# Patient Record
Sex: FEMALE | Race: WHITE | NOT HISPANIC OR LATINO | Employment: OTHER | URBAN - METROPOLITAN AREA
[De-identification: names, ages, dates, MRNs, and addresses within clinical notes are randomized per-mention and may not be internally consistent; named-entity substitution may affect disease eponyms.]

---

## 2018-01-09 ENCOUNTER — HOSPITAL ENCOUNTER (OUTPATIENT)
Dept: URGENT CARE | Facility: CLINIC | Age: 63
Discharge: HOME | End: 2018-01-09
Attending: FAMILY MEDICINE | Admitting: FAMILY MEDICINE

## 2018-01-18 ENCOUNTER — HOSPITAL ENCOUNTER (OUTPATIENT)
Dept: URGENT CARE | Facility: CLINIC | Age: 63
Setting detail: SPECIMEN
Discharge: HOME | End: 2018-01-18
Attending: FAMILY MEDICINE | Admitting: FAMILY MEDICINE

## 2018-01-18 ENCOUNTER — HOSPITAL ENCOUNTER (OUTPATIENT)
Dept: LAB | Facility: HOSPITAL | Age: 63
Discharge: HOME | End: 2018-01-18
Attending: FAMILY MEDICINE | Admitting: FAMILY MEDICINE

## 2018-01-18 ENCOUNTER — HOSPITAL ENCOUNTER (OUTPATIENT)
Dept: URGENT CARE | Facility: CLINIC | Age: 63
Discharge: HOME | End: 2018-01-18
Attending: FAMILY MEDICINE | Admitting: FAMILY MEDICINE

## 2018-01-18 LAB
BACTERIA SPEC AEROBE CULT: NORMAL
BACTERIA SPEC AEROBE CULT: NORMAL
CONV ABS BANDS: 1.7 10*3/UL
CONV POLYCHROMASIA IN BLOOD BY LIGHT MICROSCOPY: SLIGHT
CONV TOXIC GRANULES IN BLOOD BY LIGHT MICROSCOPY: SLIGHT
DIFFERENTIAL METHOD BLD: (no result)
EOSINOPHIL # BLD AUTO: 0.6 10*3/UL (ref 0–0.3)
EOSINOPHIL # BLD AUTO: 3 % (ref 0–3)
ERYTHROCYTE [DISTWIDTH] IN BLOOD BY AUTOMATED COUNT: 13.3 % (ref 11.5–14.5)
HCT VFR BLD AUTO: 30.8 % (ref 35–49)
HGB BLD-MCNC: 10.3 G/DL (ref 12–15)
LYMPHOCYTES # BLD AUTO: 1.3 10*3/UL (ref 0.8–4.8)
LYMPHOCYTES NFR BLD AUTO: 6 % (ref 18–42)
Lab: NORMAL
Lab: NORMAL
MCH RBC QN AUTO: 28.7 PG (ref 26–32)
MCHC RBC AUTO-ENTMCNC: 33.4 G/DL (ref 32–36)
MCV RBC AUTO: 86 FL (ref 80–94)
MICRO REPORT STATUS: NORMAL
MICRO REPORT STATUS: NORMAL
MONOCYTES # BLD AUTO: 0.8 10*3/UL (ref 0.1–1.3)
MONOCYTES NFR BLD AUTO: 4 % (ref 2–11)
NEUTROPHILS # BLD AUTO: 16.6 10*3/UL (ref 2.3–8.6)
NEUTROPHILS NFR BLD AUTO: 79 % (ref 50–75)
NEUTS BAND NFR BLD MANUAL: 8 % (ref 0–5)
PATHOLOGIST REVIEW: (no result)
PATHOLOGIST REVIEW: (no result)
PLATELET # BLD AUTO: 845 10*3/UL (ref 150–450)
PLT COMMENT: (no result)
PMV BLD AUTO: 6.2 FL (ref 7.4–10.4)
RBC # BLD AUTO: 3.58 10*6/UL (ref 4–5.4)
SPECIMEN SOURCE: NORMAL
SPECIMEN SOURCE: NORMAL
WBC # BLD AUTO: 21 10*3/UL (ref 4.5–11.5)

## 2018-01-22 ENCOUNTER — CLINICAL SUPPORT (OUTPATIENT)
Dept: ONCOLOGY | Facility: HOSPITAL | Age: 63
End: 2018-01-22

## 2018-01-22 ENCOUNTER — HOSPITAL ENCOUNTER (OUTPATIENT)
Dept: ONCOLOGY | Facility: HOSPITAL | Age: 63
Discharge: HOME | End: 2018-01-22
Attending: INTERNAL MEDICINE | Admitting: INTERNAL MEDICINE

## 2018-01-22 ENCOUNTER — HOSPITAL ENCOUNTER (OUTPATIENT)
Dept: ONCOLOGY | Facility: CLINIC | Age: 63
Setting detail: INFUSION SERIES
Discharge: HOME | End: 2018-01-22
Attending: INTERNAL MEDICINE | Admitting: INTERNAL MEDICINE

## 2018-01-22 LAB
ALBUMIN SERPL-MCNC: 2.4 G/DL (ref 3.5–4.8)
ALBUMIN/GLOB SERPL: 0.5 {RATIO} (ref 1–1.7)
ALP SERPL-CCNC: 84 IU/L (ref 32–91)
ALT SERPL-CCNC: 46 IU/L (ref 14–54)
ANION GAP SERPL CALC-SCNC: 12.5 MMOL/L (ref 10–20)
AST SERPL-CCNC: 29 IU/L (ref 15–41)
BILIRUB SERPL-MCNC: 1 MG/DL (ref 0.3–1.2)
BUN SERPL-MCNC: 9 MG/DL (ref 8–20)
BUN/CREAT SERPL: 15 (ref 5.4–26.2)
CALCIUM SERPL-MCNC: 8.7 MG/DL (ref 8.9–10.3)
CHLORIDE SERPL-SCNC: 99 MMOL/L (ref 101–111)
CONV CO2: 28 MMOL/L (ref 22–32)
CONV TOTAL PROTEIN: 7.5 G/DL (ref 6.1–7.9)
CREAT UR-MCNC: 0.6 MG/DL (ref 0.4–1)
GLOBULIN UR ELPH-MCNC: 5.1 G/DL (ref 2.5–3.8)
GLUCOSE SERPL-MCNC: 76 MG/DL (ref 65–99)
IRON SATN MFR SERPL: 7 % (ref 15–50)
IRON SERPL-MCNC: 15 UG/DL (ref 28–170)
POTASSIUM SERPL-SCNC: 3.5 MMOL/L (ref 3.6–5.1)
SODIUM SERPL-SCNC: 136 MMOL/L (ref 136–144)
TIBC SERPL-MCNC: 220 UG/DL (ref 228–428)

## 2018-01-24 LAB — HAPTOGLOB SERPL-MCNC: 582 MG/DL (ref 36–195)

## 2018-01-29 ENCOUNTER — HOSPITAL ENCOUNTER (OUTPATIENT)
Dept: ONCOLOGY | Facility: HOSPITAL | Age: 63
Discharge: HOME | End: 2018-01-29
Attending: INTERNAL MEDICINE | Admitting: INTERNAL MEDICINE

## 2018-01-29 ENCOUNTER — HOSPITAL ENCOUNTER (OUTPATIENT)
Dept: ONCOLOGY | Facility: CLINIC | Age: 63
Setting detail: INFUSION SERIES
Discharge: HOME | End: 2018-01-29
Attending: INTERNAL MEDICINE | Admitting: INTERNAL MEDICINE

## 2018-01-29 ENCOUNTER — HOSPITAL ENCOUNTER (OUTPATIENT)
Dept: GENERAL RADIOLOGY | Facility: HOSPITAL | Age: 63
Discharge: HOME | End: 2018-01-29
Attending: INTERNAL MEDICINE | Admitting: INTERNAL MEDICINE

## 2018-02-05 ENCOUNTER — CLINICAL SUPPORT (OUTPATIENT)
Dept: ONCOLOGY | Facility: HOSPITAL | Age: 63
End: 2018-02-05

## 2018-02-05 ENCOUNTER — HOSPITAL ENCOUNTER (OUTPATIENT)
Dept: ONCOLOGY | Facility: CLINIC | Age: 63
Setting detail: INFUSION SERIES
Discharge: HOME | End: 2018-02-05
Attending: INTERNAL MEDICINE | Admitting: INTERNAL MEDICINE

## 2018-02-20 ENCOUNTER — HOSPITAL ENCOUNTER (OUTPATIENT)
Dept: ONCOLOGY | Facility: HOSPITAL | Age: 63
Discharge: HOME | End: 2018-02-20
Attending: INTERNAL MEDICINE | Admitting: INTERNAL MEDICINE

## 2018-02-20 ENCOUNTER — CLINICAL SUPPORT (OUTPATIENT)
Dept: ONCOLOGY | Facility: HOSPITAL | Age: 63
End: 2018-02-20

## 2018-02-20 ENCOUNTER — HOSPITAL ENCOUNTER (OUTPATIENT)
Dept: ONCOLOGY | Facility: CLINIC | Age: 63
Setting detail: INFUSION SERIES
Discharge: HOME | End: 2018-02-20
Attending: INTERNAL MEDICINE | Admitting: INTERNAL MEDICINE

## 2018-02-20 LAB — URATE SERPL-MCNC: 4.9 MG/DL (ref 2.6–8)

## 2018-02-28 ENCOUNTER — CLINICAL SUPPORT (OUTPATIENT)
Dept: ONCOLOGY | Facility: HOSPITAL | Age: 63
End: 2018-02-28

## 2018-02-28 ENCOUNTER — HOSPITAL ENCOUNTER (OUTPATIENT)
Dept: ONCOLOGY | Facility: CLINIC | Age: 63
Setting detail: INFUSION SERIES
Discharge: HOME | End: 2018-02-28
Attending: INTERNAL MEDICINE | Admitting: INTERNAL MEDICINE

## 2018-04-02 ENCOUNTER — HOSPITAL ENCOUNTER (OUTPATIENT)
Dept: ONCOLOGY | Facility: CLINIC | Age: 63
Setting detail: INFUSION SERIES
Discharge: HOME | End: 2018-04-02
Attending: INTERNAL MEDICINE | Admitting: INTERNAL MEDICINE

## 2018-04-02 ENCOUNTER — HOSPITAL ENCOUNTER (OUTPATIENT)
Dept: ONCOLOGY | Facility: HOSPITAL | Age: 63
Discharge: HOME | End: 2018-04-02
Attending: INTERNAL MEDICINE | Admitting: INTERNAL MEDICINE

## 2018-04-02 ENCOUNTER — CLINICAL SUPPORT (OUTPATIENT)
Dept: ONCOLOGY | Facility: HOSPITAL | Age: 63
End: 2018-04-02

## 2018-04-02 LAB
ALBUMIN SERPL-MCNC: 3.1 G/DL (ref 3.5–4.8)
ALBUMIN/GLOB SERPL: 0.6 {RATIO} (ref 1–1.7)
ALP SERPL-CCNC: 62 IU/L (ref 32–91)
ALT SERPL-CCNC: 14 IU/L (ref 14–54)
ANION GAP SERPL CALC-SCNC: 14.6 MMOL/L (ref 10–20)
AST SERPL-CCNC: 20 IU/L (ref 15–41)
BILIRUB SERPL-MCNC: 0.5 MG/DL (ref 0.3–1.2)
BUN SERPL-MCNC: 7 MG/DL (ref 8–20)
BUN/CREAT SERPL: 11.7 (ref 5.4–26.2)
CALCIUM SERPL-MCNC: 9 MG/DL (ref 8.9–10.3)
CHLORIDE SERPL-SCNC: 99 MMOL/L (ref 101–111)
CONV CO2: 25 MMOL/L (ref 22–32)
CONV TOTAL PROTEIN: 7.9 G/DL (ref 6.1–7.9)
CREAT UR-MCNC: 0.6 MG/DL (ref 0.4–1)
GLOBULIN UR ELPH-MCNC: 4.8 G/DL (ref 2.5–3.8)
GLUCOSE SERPL-MCNC: 96 MG/DL (ref 65–99)
POTASSIUM SERPL-SCNC: 3.6 MMOL/L (ref 3.6–5.1)
SODIUM SERPL-SCNC: 135 MMOL/L (ref 136–144)

## 2018-06-29 ENCOUNTER — APPOINTMENT (OUTPATIENT)
Dept: WOMENS IMAGING | Facility: HOSPITAL | Age: 63
End: 2018-06-29

## 2018-06-29 PROCEDURE — 77067 SCR MAMMO BI INCL CAD: CPT | Performed by: RADIOLOGY

## 2018-06-29 PROCEDURE — 77063 BREAST TOMOSYNTHESIS BI: CPT | Performed by: RADIOLOGY

## 2018-07-02 ENCOUNTER — CLINICAL SUPPORT (OUTPATIENT)
Dept: ONCOLOGY | Facility: HOSPITAL | Age: 63
End: 2018-07-02

## 2018-07-02 ENCOUNTER — HOSPITAL ENCOUNTER (OUTPATIENT)
Dept: ONCOLOGY | Facility: CLINIC | Age: 63
Setting detail: INFUSION SERIES
Discharge: HOME | End: 2018-07-02
Attending: INTERNAL MEDICINE | Admitting: INTERNAL MEDICINE

## 2018-09-10 ENCOUNTER — HOSPITAL ENCOUNTER (OUTPATIENT)
Dept: CARDIOLOGY | Facility: HOSPITAL | Age: 63
Discharge: HOME | End: 2018-09-10
Attending: INTERNAL MEDICINE | Admitting: INTERNAL MEDICINE

## 2018-09-10 ENCOUNTER — HOSPITAL ENCOUNTER (OUTPATIENT)
Dept: FAMILY MEDICINE CLINIC | Facility: CLINIC | Age: 63
Setting detail: SPECIMEN
Discharge: HOME | End: 2018-09-10
Attending: INTERNAL MEDICINE | Admitting: INTERNAL MEDICINE

## 2018-09-10 LAB
ALBUMIN SERPL-MCNC: 4 G/DL (ref 3.5–4.8)
ALBUMIN/GLOB SERPL: 1.1 {RATIO} (ref 1–1.7)
ALP SERPL-CCNC: 71 IU/L (ref 32–91)
ALT SERPL-CCNC: 22 IU/L (ref 14–54)
ANION GAP SERPL CALC-SCNC: 8.6 MMOL/L (ref 10–20)
AST SERPL-CCNC: 23 IU/L (ref 15–41)
BASOPHILS # BLD AUTO: 0.1 10*3/UL (ref 0–0.2)
BASOPHILS NFR BLD AUTO: 1 % (ref 0–2)
BILIRUB SERPL-MCNC: 0.6 MG/DL (ref 0.3–1.2)
BUN SERPL-MCNC: 13 MG/DL (ref 8–20)
BUN/CREAT SERPL: 18.6 (ref 5.4–26.2)
CALCIUM SERPL-MCNC: 9.2 MG/DL (ref 8.9–10.3)
CHLORIDE SERPL-SCNC: 102 MMOL/L (ref 101–111)
CHOLEST SERPL-MCNC: 202 MG/DL
CHOLEST/HDLC SERPL: 3.1 {RATIO}
CONV CO2: 31 MMOL/L (ref 22–32)
CONV LDL CHOLESTEROL DIRECT: 118 MG/DL (ref 0–100)
CONV TOTAL PROTEIN: 7.7 G/DL (ref 6.1–7.9)
CREAT UR-MCNC: 0.7 MG/DL (ref 0.4–1)
DIFFERENTIAL METHOD BLD: (no result)
EOSINOPHIL # BLD AUTO: 0.2 10*3/UL (ref 0–0.3)
EOSINOPHIL # BLD AUTO: 3 % (ref 0–3)
ERYTHROCYTE [DISTWIDTH] IN BLOOD BY AUTOMATED COUNT: 14.5 % (ref 11.5–14.5)
GLOBULIN UR ELPH-MCNC: 3.7 G/DL (ref 2.5–3.8)
GLUCOSE SERPL-MCNC: 84 MG/DL (ref 65–99)
HCT VFR BLD AUTO: 37.4 % (ref 35–49)
HDLC SERPL-MCNC: 65 MG/DL
HGB BLD-MCNC: 12.7 G/DL (ref 12–15)
LDLC/HDLC SERPL: 1.8 {RATIO}
LIPID INTERPRETATION: ABNORMAL
LYMPHOCYTES # BLD AUTO: 1.8 10*3/UL (ref 0.8–4.8)
LYMPHOCYTES NFR BLD AUTO: 24 % (ref 18–42)
MCH RBC QN AUTO: 30.2 PG (ref 26–32)
MCHC RBC AUTO-ENTMCNC: 34 G/DL (ref 32–36)
MCV RBC AUTO: 88.9 FL (ref 80–94)
MONOCYTES # BLD AUTO: 0.6 10*3/UL (ref 0.1–1.3)
MONOCYTES NFR BLD AUTO: 8 % (ref 2–11)
NEUTROPHILS # BLD AUTO: 4.7 10*3/UL (ref 2.3–8.6)
NEUTROPHILS NFR BLD AUTO: 64 % (ref 50–75)
NRBC BLD AUTO-RTO: 0 /100{WBCS}
NRBC/RBC NFR BLD MANUAL: 0 10*3/UL
PLATELET # BLD AUTO: 307 10*3/UL (ref 150–450)
PMV BLD AUTO: 7.4 FL (ref 7.4–10.4)
POTASSIUM SERPL-SCNC: 3.6 MMOL/L (ref 3.6–5.1)
RBC # BLD AUTO: 4.2 10*6/UL (ref 4–5.4)
SODIUM SERPL-SCNC: 138 MMOL/L (ref 136–144)
TRIGL SERPL-MCNC: 104 MG/DL
VLDLC SERPL CALC-MCNC: 18.5 MG/DL
WBC # BLD AUTO: 7.4 10*3/UL (ref 4.5–11.5)

## 2021-05-03 PROCEDURE — U0003 INFECTIOUS AGENT DETECTION BY NUCLEIC ACID (DNA OR RNA); SEVERE ACUTE RESPIRATORY SYNDROME CORONAVIRUS 2 (SARS-COV-2) (CORONAVIRUS DISEASE [COVID-19]), AMPLIFIED PROBE TECHNIQUE, MAKING USE OF HIGH THROUGHPUT TECHNOLOGIES AS DESCRIBED BY CMS-2020-01-R: HCPCS | Performed by: NURSE PRACTITIONER

## 2021-10-11 PROCEDURE — U0004 COV-19 TEST NON-CDC HGH THRU: HCPCS | Performed by: NURSE PRACTITIONER

## 2024-03-12 ENCOUNTER — HOSPITAL ENCOUNTER (EMERGENCY)
Facility: HOSPITAL | Age: 69
Discharge: HOME | End: 2024-03-12
Payer: MEDICARE

## 2024-03-12 VITALS
TEMPERATURE: 98.4 F | SYSTOLIC BLOOD PRESSURE: 133 MMHG | WEIGHT: 212.3 LBS | OXYGEN SATURATION: 97 % | HEIGHT: 65 IN | BODY MASS INDEX: 35.37 KG/M2 | HEART RATE: 64 BPM | RESPIRATION RATE: 16 BRPM | DIASTOLIC BLOOD PRESSURE: 90 MMHG

## 2024-03-12 DIAGNOSIS — M53.3 SI (SACROILIAC) JOINT DYSFUNCTION: Primary | ICD-10-CM

## 2024-03-12 PROCEDURE — 97162 PT EVAL MOD COMPLEX 30 MIN: CPT | Performed by: PHYSICAL THERAPIST

## 2024-03-12 PROCEDURE — 25010000002 KETOROLAC TROMETHAMINE PER 15 MG: Performed by: NURSE PRACTITIONER

## 2024-03-12 PROCEDURE — 96372 THER/PROPH/DIAG INJ SC/IM: CPT

## 2024-03-12 PROCEDURE — 99282 EMERGENCY DEPT VISIT SF MDM: CPT

## 2024-03-12 PROCEDURE — 25010000002 DEXAMETHASONE SODIUM PHOSPHATE 10 MG/ML SOLUTION: Performed by: NURSE PRACTITIONER

## 2024-03-12 RX ORDER — DEXAMETHASONE SODIUM PHOSPHATE 10 MG/ML
10 INJECTION, SOLUTION INTRAMUSCULAR; INTRAVENOUS ONCE
Status: COMPLETED | OUTPATIENT
Start: 2024-03-12 | End: 2024-03-12

## 2024-03-12 RX ORDER — KETOROLAC TROMETHAMINE 30 MG/ML
30 INJECTION, SOLUTION INTRAMUSCULAR; INTRAVENOUS ONCE
Status: COMPLETED | OUTPATIENT
Start: 2024-03-12 | End: 2024-03-12

## 2024-03-12 RX ORDER — METHYLPREDNISOLONE 4 MG/1
TABLET ORAL
Qty: 21 TABLET | Refills: 0 | Status: SHIPPED | OUTPATIENT
Start: 2024-03-12

## 2024-03-12 RX ADMIN — KETOROLAC TROMETHAMINE 30 MG: 30 INJECTION, SOLUTION INTRAMUSCULAR; INTRAVENOUS at 10:52

## 2024-03-12 RX ADMIN — DEXAMETHASONE SODIUM PHOSPHATE 10 MG: 10 INJECTION, SOLUTION INTRAMUSCULAR; INTRAVENOUS at 10:52

## 2024-03-12 NOTE — ED PROVIDER NOTES
Subjective   History of Present Illness  68-year-old female presents to the emergency room with complaint of right low back pain.  She denies sudden loss of bowel or bladder control.  She denies any known injury.      Review of Systems   Genitourinary:  Negative for dysuria, pelvic pain and urgency.   Musculoskeletal:  Positive for back pain.       History reviewed. No pertinent past medical history.    Allergies   Allergen Reactions    Augmentin [Amoxicillin-Pot Clavulanate] Hives       Past Surgical History:   Procedure Laterality Date    HAND SURGERY      HYSTERECTOMY         History reviewed. No pertinent family history.    Social History     Socioeconomic History    Marital status:    Tobacco Use    Smoking status: Never    Smokeless tobacco: Never   Vaping Use    Vaping status: Never Used   Substance and Sexual Activity    Alcohol use: Yes     Comment: wine, rare    Drug use: No    Sexual activity: Defer           Objective   Physical Exam  Constitutional:       General: She is not in acute distress.     Appearance: Normal appearance. She is not ill-appearing, toxic-appearing or diaphoretic.   HENT:      Head: Normocephalic and atraumatic.      Mouth/Throat:      Mouth: Mucous membranes are moist.   Eyes:      Extraocular Movements: Extraocular movements intact.      Conjunctiva/sclera: Conjunctivae normal.      Pupils: Pupils are equal, round, and reactive to light.   Cardiovascular:      Rate and Rhythm: Normal rate.      Pulses: Normal pulses.   Abdominal:      Palpations: Abdomen is soft.   Musculoskeletal:      Lumbar back: Normal. No tenderness or bony tenderness. Negative right straight leg raise test and negative left straight leg raise test.        Back:    Neurological:      Mental Status: She is alert.         Procedures           ED Course  ED Course as of 03/12/24 1152   Tue Mar 12, 2024   1125 ER PT eval and care of patient with SI joint dysfunction. OP PT recommended. [CT]      ED Course  User Index  [CT] Penelope Calloway APRN                                   Medications   ketorolac (TORADOL) injection 30 mg (30 mg Intramuscular Given 3/12/24 1052)   dexAMETHasone sodium phosphate injection 10 mg (10 mg Intramuscular Given 3/12/24 1052)            No radiology results for the last day   Medical Decision Making  68-year-old presents to the emergency room with complaint of right-sided low back pain.    Problems Addressed:  SI (sacroiliac) joint dysfunction: self-limited or minor problem     Details: ER PT eval and treat    Amount and/or Complexity of Data Reviewed  ECG/medicine tests: ordered.     Details: Toradol and Decadron ordered and administered.    Risk  Prescription drug management.  Risk Details: Discharge home to self-care with outpatient ambulatory PT ordered.  Medrol Dosepak ordered and prescribed at discharge.  Follow-up with PT recommendations.  See your primary care for any other worsening conditions.        Final diagnoses:   SI (sacroiliac) joint dysfunction       ED Disposition  ED Disposition       ED Disposition   Discharge    Condition   Stable    Comment   --               PATIENT CONNECTION - [place] [id number] [phone]  Schedule an appointment as soon as possible for a visit today  TO ESTABLISH HEALTHCARE WITH A PRIMARY CARE PROVIDER         Medication List        New Prescriptions      methylPREDNISolone 4 MG dose pack  Commonly known as: MEDROL  Take as directed on package instructions.               Where to Get Your Medications        These medications were sent to [id number] - [place], [place] - [address]  - [phone]  - [phone] FX  [address] [place] [address]      Phone: [phone]   methylPREDNISolone 4 MG dose pack            Penelope Calloway APRN  03/15/24 8283

## 2024-03-12 NOTE — THERAPY EVALUATION
Patient Name: Marilyn Pineda  : 1955    MRN: [id number]                              Today's Date: 3/12/2024       Admit Date: 3/12/2024    Visit Dx:     ICD-10-CM ICD-9-CM   1. SI (sacroiliac) joint dysfunction  M53.3 724.6     There is no problem list on file for this patient.    History reviewed. No pertinent past medical history.  Past Surgical History:   Procedure Laterality Date    HAND SURGERY      HYSTERECTOMY       SUBJECTIVE:  Pt reporting right hip pain and right low back pain, has been sick with the flu and in bed for the last three weeks and her hip always flares up like this afterwards.       OBJECTIVE:    AROM - minimal lumbar extension noted   PROM not tested   MMT LE - 4-/5 RLE      SIJ TESTING   Compression - positive    Distraction - negative    Gillettes - positive (R)    Posterior ilial rotation - dec pain (R)    Anterior ilial rotation  - inc pain (R)   SENSATION - normal   REFLEXES - not tested     ASSESSMENT:   Pt presents with a diagnosis of  (R) SIJ dysfunction and right anterior ilial rotation and has pain and decreased mobility that are limiting her ability to perform ADLs. She tolerated manual therapy well and was issued HEP for this area. Ed regarding OPPT for continued care and possible ultrasound for greater trochanter pain / inflammation from ER tendons.     Goals:   LTG 1: The patient will be independent in HEP in order to decrease pain and improve tolerance to functional activities.  STATUS: Met    Interventions:   Manual Therapy: posterior ilial rotation and outflare correction (R)LE     Therapeutic Exercises: isometric hamstring (R) 10 reps 10 sec H     Modalities: none       PLAN:    HEP and OPPT      Time Calculation:   PT Evaluation Complexity  History, PT Evaluation Complexity: 1-2 personal factors and/or comorbidities  Examination of Body Systems (PT Eval Complexity): total of 3 or more elements  Clinical Presentation (PT Evaluation Complexity): evolving  Clinical  Decision Making (PT Evaluation Complexity): moderate complexity  Overall Complexity (PT Evaluation Complexity): moderate complexity     PT Charges       Row Name 03/12/24 1747             Time Calculation    Start Time 1113  -AD      Stop Time 1141  -AD      Time Calculation (min) 28 min  -AD      PT Received On 03/12/24  -AD         Time Calculation- PT    Total Timed Code Minutes- PT 0 minute(s)  -AD                User Key  (r) = Recorded By, (t) = Taken By, (c) = Cosigned By      Initials Name Provider Type    AD Kacey Mustafa, PT Physical Therapist                  Therapy Charges for Today       Code Description Service Date Service Provider Modifiers Qty    79577138969 HC PT EVAL MOD COMPLEXITY 4 3/12/2024 Kacey Mustafa, PT GP 1               PT Discharge Summary  Anticipated Discharge Disposition (PT): home with outpatient therapy services    Kacey Mustafa, TARYN  3/12/2024

## 2024-03-12 NOTE — DISCHARGE INSTRUCTIONS
Rest and fill and take prescribed Medrol Dosepak starting tomorrow.  Perform back exercises as recommended by PT here in ED today.  Follow-up with PT outpatient care as directed by physical therapist here in the ED.  Please establish care with a primary care provider for all your nonemergent healthcare needs.

## 2024-03-12 NOTE — PLAN OF CARE
ASSESSMENT:   Pt presents with a diagnosis of  (R) SIJ dysfunction and right anterior ilial rotation and has pain and decreased mobility that are limiting her ability to perform ADLs. She tolerated manual therapy well and was issued HEP for this area. Ed regarding OPPT for continued care and possible ultrasound for greater trochanter pain / inflammation from ER tendons.     Goals:   LTG 1: The patient will be independent in HEP in order to decrease pain and improve tolerance to functional activities.  STATUS: Met    Interventions:   Manual Therapy: posterior ilial rotation and outflare correction (R)LE     Therapeutic Exercises: isometric hamstring (R) 10 reps 10 sec H     Modalities: none       PLAN:    HEP and OPPT

## 2024-03-25 ENCOUNTER — TELEPHONE (OUTPATIENT)
Dept: ORTHOPEDICS | Facility: OTHER | Age: 69
End: 2024-03-25
Payer: MEDICARE

## 2024-03-26 ENCOUNTER — TREATMENT (OUTPATIENT)
Dept: PHYSICAL THERAPY | Facility: CLINIC | Age: 69
End: 2024-03-26
Payer: MEDICARE

## 2024-03-26 DIAGNOSIS — M53.3 SI (SACROILIAC) JOINT DYSFUNCTION: Primary | ICD-10-CM

## 2024-03-26 NOTE — PROGRESS NOTES
Physical Therapy Initial Evaluation and Plan of Care    Patient: Marilyn Pineda   : 1955  Diagnosis/ICD-10 Code:  SI (sacroiliac) joint dysfunction [M53.3]  Referring practitioner: Abelardo Flannery DO  Date of Initial Visit: 3/26/2024  Today's Date: 3/26/2024  Patient seen for 1 sessions         Visit Diagnoses:    ICD-10-CM ICD-9-CM   1. SI (sacroiliac) joint dysfunction  M53.3 724.6       Subjective Questionnaire: Oswestry: 36    Subjective Evaluation    History of Present Illness  Mechanism of injury: 68 year old female with recent and sudden onset of low back pain that caused her to go to the ED on 3/12/24. States she had the flu and a week later she woke up with severe back pain. Pt received PT in the ED consisting of manual intervention and HEP as well as a steroid dose pack. Pt also received a Torodol injection.     Aching into R foot arch. R buttock and posterior thigh. Denies N/T and B/B.       Patient Occupation: retired Pain  At worst pain rating: 10  Location: R sided low back  Quality: dull ache, radiating and sharp  Relieving factors: change in position  Aggravating factors: prolonged positioning, standing, lifting, movement, repetitive movement, ambulation and sleeping    Treatments  Treatments tried: 1x PT visit in ER.  Patient Goals  Patient goals for therapy: increased strength, independence with ADLs/IADLs, decreased pain and increased motion             Objective          Postural Observations  Seated posture: poor  Standing posture: fair    Additional Postural Observation Details  R LE swelling- pt reports she has always had this     Palpation   Left   Tenderness of the lumbar paraspinals.     Right Tenderness of the lumbar paraspinals.     Tenderness     Right Hip   Tenderness in the PSIS.     Neurological Testing     Sensation     Lumbar   Left   Intact: light touch    Right   Intact: light touch    Active Range of Motion     Lumbar   Flexion: 90 degrees   Extension: 0 degrees      Strength/Myotome Testing     Left Hip   Planes of Motion   Flexion: 3+  Extension: 3+  Abduction: 4-  Adduction: 4-    Right Hip   Planes of Motion   Flexion: 3-  Extension: 3  Abduction: 3+  Adduction: 3+    Additional Strength Details  Lower abdominals 2/5    Tests     Lumbar     Right   Positive passive SLR.           Assessment & Plan       Assessment  Impairments: abnormal or restricted ROM, impaired physical strength, lacks appropriate home exercise program and pain with function   Functional limitations: lifting, sleeping, walking, sitting, standing and unable to perform repetitive tasks   Assessment details: 68 year old female with recent and sudden onset of low back pain that caused her to go to the ED on 3/12/24. States she had the flu and a week later she woke up with severe back pain. Pt presents to skilled PT with low back pain, limited lumbar AROM, B hip and core weakness. Pt demo difficulty with STS transfers and bed mobility tasks due to pain. Pt would benefit from skilled PT to address the above findings and improve pt's ease with functional mobility and return to PLOF.    Barriers to therapy: None  Prognosis: good    Goals  Plan Goals: ST.Pt will report reduction in worst pain level to 6/10 in 2 weeks.  2.Pt will improve AROM of lumbar spine flex to 100 deg in 2 weeks.  3. Pt will demo improved postural awareness and correction in 2 weeks for decreased spinal stress.  4. Pt will demo good tolerance and compliance with HEP in 2 weeks.    LT.Pt will be independent with HEP in 6 weeks for self management and prevention of re-occurrence.   2.Pt will improve strength of B hip mm to grossly 4/5 in 6 weeks.  3. Pt will improve lower abdominal strength to grossly 3+/5 in 6 weeks.  4.Pt will improve AROM of lumbar spine to WFL in 6 weeks.  5.Pt will report reduction in worst pain level to 4/10 in 6 weeks.  6. Pt will improve modified oswestry score to 20% in 6 weeks.    Plan  Therapy options:  will be seen for skilled therapy services  Planned modality interventions: cryotherapy, thermotherapy (hydrocollator packs), electrical stimulation/Russian stimulation and traction  Planned therapy interventions: abdominal trunk stabilization, body mechanics training, flexibility, functional ROM exercises, home exercise program, manual therapy, neuromuscular re-education, postural training, soft tissue mobilization, spinal/joint mobilization, strengthening, stretching and therapeutic activities  Frequency: 2x week  Duration in weeks: 6  Treatment plan discussed with: patient        History # of Personal Factors and/or Comorbidities: MODERATE (1-2)  Examination of Body System(s): # of elements: MODERATE (3)  Clinical Presentation: EVOLVING  Clinical Decision Making: MODERATE      Timed:  Manual Therapy:    8     mins  89489;  Therapeutic Exercise:    15     mins  95806;       Untimed:  Mod Eval      15     Mins  05796      Timed Treatment:   23   mins   Total Treatment:     38   mins    PT SIGNATURE: Olivia Gutierrez PT, DPT  PT license: [place] [id number]        DATE TREATMENT INITIATED: 3/26/2024    Medicare Initial Certification  Certification Period: 6/23/2024  I certify that the therapy services are furnished while this patient is under my care.  The services outlined above are required by this patient, and will be reviewed every 90 days.    Physician Signature: __________________________________________________________    PHYSICIAN: Abelardo Flannery, DO      DATE:     Please sign and return via fax to [phone].. Thank you, [facility].  [address] [place] [address]

## 2024-04-01 ENCOUNTER — TREATMENT (OUTPATIENT)
Dept: PHYSICAL THERAPY | Facility: CLINIC | Age: 69
End: 2024-04-01
Payer: MEDICARE

## 2024-04-01 DIAGNOSIS — M53.3 SI (SACROILIAC) JOINT DYSFUNCTION: Primary | ICD-10-CM

## 2024-04-01 NOTE — PROGRESS NOTES
Physical Therapy Daily Progress Note      Patient: Marilyn Pineda   : 1955  Diagnosis/ICD-10 Code:  SI (sacroiliac) joint dysfunction [M53.3]  Referring practitioner: Abelardo Flannery DO  Date of Initial Visit: Type: THERAPY  Noted: 3/26/2024  Today's Date: 2024  Patient seen for 2 sessions             Subjective Pt reports increased aching and soreness in her right SI/glut region.    Objective   See Exercise, Manual, and Modality Logs for complete treatment.       Assessment/Plan  Pt seemed to respond well to manual techniques, exercises and addition of stim.    Progress per Plan of Care           Timed:         Manual Therapy:    15     mins  86644;     Therapeutic Exercise:    15     mins  71562;         Un-Timed:  Electrical Stimulation:    15     mins  12478 ( );    Timed Treatment:   30   mins   Total Treatment:     45   mins        Donaldo Dejesus PTA  Physical Therapist Assistant

## 2024-04-04 ENCOUNTER — TREATMENT (OUTPATIENT)
Dept: PHYSICAL THERAPY | Facility: CLINIC | Age: 69
End: 2024-04-04
Payer: MEDICARE

## 2024-04-04 DIAGNOSIS — M53.3 SI (SACROILIAC) JOINT DYSFUNCTION: Primary | ICD-10-CM

## 2024-04-04 NOTE — PROGRESS NOTES
Physical Therapy Daily Treatment Note  Visit: 3        Patient: Marilyn Pineda   : 1955  Diagnosis/ICD-10 Code:  SI (sacroiliac) joint dysfunction [M53.3]  Referring practitioner: Abelardo Flannery DO  Date of Initial Visit: Type: THERAPY  Noted: 3/26/2024  Today's Date: 2024  Patient seen for 3 sessions       Visit Diagnoses:    ICD-10-CM ICD-9-CM   1. SI (sacroiliac) joint dysfunction  M53.3 724.6       Subjective     Marilyn Pineda reports: cont pain in R SIJ area, but hasn't needed pain medication as often.     Objective   See Exercise, Manual, and Modality Logs for complete treatment.       Assessment/Plan  Good response to manual intervention and modalities this date. Cont to progress exercises as tolerated.     Progress per Plan of Care.         Timed:  Manual Therapy:    15     mins  19240;  Therapeutic Exercise:    15     mins  46477;         Untimed:  Electrical Stimulation:    15     mins  19285 ( );      Timed Treatment:   30   mins   Total Treatment:     45   mins        Olivia Gutierrez PT, DPT  Physical Therapist  PT license: [place] [id number]

## 2024-04-08 ENCOUNTER — TREATMENT (OUTPATIENT)
Dept: PHYSICAL THERAPY | Facility: CLINIC | Age: 69
End: 2024-04-08
Payer: MEDICARE

## 2024-04-08 DIAGNOSIS — M53.3 SI (SACROILIAC) JOINT DYSFUNCTION: Primary | ICD-10-CM

## 2024-04-09 NOTE — PROGRESS NOTES
Physical Therapy Daily Treatment Note  Visit: 4        Patient: Marilyn Pineda   : 1955  Diagnosis/ICD-10 Code:  SI (sacroiliac) joint dysfunction [M53.3]  Referring practitioner: Abelardo Flanneyr DO  Date of Initial Visit: Type: THERAPY  Noted: 3/26/2024  Today's Date: 2024  Patient seen for 4 sessions       Visit Diagnoses:    ICD-10-CM ICD-9-CM   1. SI (sacroiliac) joint dysfunction  M53.3 724.6       Subjective     Marilyn Pineda reports: soreness in R hip after manual intervention last visit.     Objective   See Exercise, Manual, and Modality Logs for complete treatment.       Assessment/Plan  Responding well to modalities and gentle stretching. Held manual today due to pt having to take care of her father this evening. Requested to hold until Thursday.     Progress per Plan of Care.         Timed:  Therapeutic Exercise:    23     mins  89793;       Untimed:  Electrical Stimulation:    15     mins  10415 ( );      Timed Treatment:   23   mins   Total Treatment:     38   mins        Olivia Gutierrez PT, DPT  Physical Therapist  PT license: [place] [id number]

## 2024-04-11 ENCOUNTER — TREATMENT (OUTPATIENT)
Dept: PHYSICAL THERAPY | Facility: CLINIC | Age: 69
End: 2024-04-11
Payer: MEDICARE

## 2024-04-11 DIAGNOSIS — M53.3 SI (SACROILIAC) JOINT DYSFUNCTION: Primary | ICD-10-CM

## 2024-04-11 NOTE — PROGRESS NOTES
Physical Therapy Daily Treatment Note  Visit: 5        Patient: Marilyn Pineda   : 1955  Diagnosis/ICD-10 Code:  SI (sacroiliac) joint dysfunction [M53.3]  Referring practitioner: Abelardo Flannery DO  Date of Initial Visit: Type: THERAPY  Noted: 3/26/2024  Today's Date: 2024  Patient seen for 5 sessions       Visit Diagnoses:    ICD-10-CM ICD-9-CM   1. SI (sacroiliac) joint dysfunction  M53.3 724.6       Subjective     Marilyn Pineda reports: cont low back/ R SI joint pain.    Objective   See Exercise, Manual, and Modality Logs for complete treatment.       Assessment/Plan  SIJ mobilizations performed today along with STM to R LE. Pt cont to demo s/s of lymphedema. Advised on seeking care from MD for this. Pt verbalized understanding.     Progress per Plan of Care.         Timed:  Manual Therapy:    28     mins  19854;  Therapeutic Exercise:    10     mins  65088;       Untimed:  Electrical Stimulation:    15     mins  77723 ( );    Timed Treatment:   38   mins   Total Treatment:     53   mins        Olivia Gutierrez, PT, DPT  Physical Therapist  PT license: [place] [id number]

## 2024-04-15 ENCOUNTER — TREATMENT (OUTPATIENT)
Dept: PHYSICAL THERAPY | Facility: CLINIC | Age: 69
End: 2024-04-15
Payer: MEDICARE

## 2024-04-15 DIAGNOSIS — M53.3 SI (SACROILIAC) JOINT DYSFUNCTION: Primary | ICD-10-CM

## 2024-04-15 PROCEDURE — 97112 NEUROMUSCULAR REEDUCATION: CPT | Performed by: PHYSICAL THERAPIST

## 2024-04-15 PROCEDURE — 97110 THERAPEUTIC EXERCISES: CPT | Performed by: PHYSICAL THERAPIST

## 2024-04-15 PROCEDURE — G0283 ELEC STIM OTHER THAN WOUND: HCPCS | Performed by: PHYSICAL THERAPIST

## 2024-04-15 PROCEDURE — 97140 MANUAL THERAPY 1/> REGIONS: CPT | Performed by: PHYSICAL THERAPIST

## 2024-04-15 NOTE — PROGRESS NOTES
Physical Therapy Daily Treatment Note  Visit: 6        Patient: Marilyn Pineda   : 1955  Diagnosis/ICD-10 Code:  SI (sacroiliac) joint dysfunction [M53.3]  Referring practitioner: Abelardo Flannery DO  Date of Initial Visit: Type: THERAPY  Noted: 3/26/2024  Today's Date: 4/15/2024  Patient seen for 6 sessions       Visit Diagnoses:    ICD-10-CM ICD-9-CM   1. SI (sacroiliac) joint dysfunction  M53.3 724.6       Subjective     Marilyn Pineda reports: cont pain in L SIJ    Objective   See Exercise, Manual, and Modality Logs for complete treatment.       Assessment/Plan  Cont to respond well to manual intervention for SIJ PA and STM. Reports decreased pain with modalities. Issued gentle hip and core strengthening exercises today with good tolerance.     Progress per Plan of Care.         Timed:  Manual Therapy:    15     mins  88142;  Therapeutic Exercise:    15     mins  21133;     Neuromuscular Karina:    8    mins  66293;      Untimed:  Electrical Stimulation:    15     mins  02313 ( );      Timed Treatment:   38   mins   Total Treatment:     53   mins        Olivia Gutierrez, PT, DPT  Physical Therapist  PT license: [place] [id number]

## 2024-04-18 ENCOUNTER — TREATMENT (OUTPATIENT)
Dept: PHYSICAL THERAPY | Facility: CLINIC | Age: 69
End: 2024-04-18
Payer: MEDICARE

## 2024-04-18 DIAGNOSIS — M53.3 SI (SACROILIAC) JOINT DYSFUNCTION: Primary | ICD-10-CM

## 2024-04-18 NOTE — PROGRESS NOTES
Physical Therapy Daily Treatment Note  Visit: 7        Patient: Marilyn Pineda   : 1955  Diagnosis/ICD-10 Code:  SI (sacroiliac) joint dysfunction [M53.3]  Referring practitioner: Abelardo Flannery DO  Date of Initial Visit: Type: THERAPY  Noted: 3/26/2024  Today's Date: 2024  Patient seen for 7 sessions       Visit Diagnoses:    ICD-10-CM ICD-9-CM   1. SI (sacroiliac) joint dysfunction  M53.3 724.6       Subjective     Marilyn Pineda reports: decreasing pain, but still having catching and pain with walking, lifting her R LE and STS transitions.     Objective   See Exercise, Manual, and Modality Logs for complete treatment.       Assessment/Plan  Trial of phono/ US performed today with good tolerance. Will assess effectiveness next visit. Cont stretching and modalities for pain relief.     Progress per Plan of Care.         Timed:  Therapeutic Exercise:    15     mins  93732;     Ultrasound:     8     mins  11590;      Untimed:  Electrical Stimulation:    15     mins  43026 ( );    Timed Treatment:   23   mins   Total Treatment:     38   mins        Olivia Gutierrez, PT, DPT  Physical Therapist  PT license: [place] [id number]

## 2024-04-22 ENCOUNTER — TREATMENT (OUTPATIENT)
Dept: PHYSICAL THERAPY | Facility: CLINIC | Age: 69
End: 2024-04-22
Payer: MEDICARE

## 2024-04-22 DIAGNOSIS — M53.3 SI (SACROILIAC) JOINT DYSFUNCTION: Primary | ICD-10-CM

## 2024-04-22 PROCEDURE — G0283 ELEC STIM OTHER THAN WOUND: HCPCS | Performed by: PHYSICAL THERAPIST

## 2024-04-22 PROCEDURE — 97110 THERAPEUTIC EXERCISES: CPT | Performed by: PHYSICAL THERAPIST

## 2024-04-22 PROCEDURE — 97035 APP MDLTY 1+ULTRASOUND EA 15: CPT | Performed by: PHYSICAL THERAPIST

## 2024-04-22 NOTE — PROGRESS NOTES
Physical Therapy Daily Treatment Note  Visit: 8        Patient: Marilyn Pineda   : 1955  Diagnosis/ICD-10 Code:  SI (sacroiliac) joint dysfunction [M53.3]  Referring practitioner: Abelardo Flannery DO  Date of Initial Visit: Type: THERAPY  Noted: 3/26/2024  Today's Date: 2024  Patient seen for 8 sessions       Visit Diagnoses:    ICD-10-CM ICD-9-CM   1. SI (sacroiliac) joint dysfunction  M53.3 724.6       Subjective     Marilyn Pineda reports: US last visit seemed to help her pain. Pain only returned yesterday.     Objective   See Exercise, Manual, and Modality Logs for complete treatment.       Assessment/Plan  Pt with good response to phono/ US last visit. Cont today along with IFC and stretching. No adverse reaction to tx session.     Progress per Plan of Care.         Timed:  Therapeutic Exercise:    20     mins  94977;     Ultrasound:     8     mins  89678;        Untimed:  Electrical Stimulation:    15     mins  57146 ( );      Timed Treatment:   28   mins   Total Treatment:     43   mins        Olivia Gutierrez PT, DPT  Physical Therapist  PT license: [place] [id number]

## 2024-04-25 ENCOUNTER — TREATMENT (OUTPATIENT)
Dept: PHYSICAL THERAPY | Facility: CLINIC | Age: 69
End: 2024-04-25
Payer: MEDICARE

## 2024-04-25 DIAGNOSIS — M53.3 SI (SACROILIAC) JOINT DYSFUNCTION: Primary | ICD-10-CM

## 2024-04-25 NOTE — PROGRESS NOTES
Physical Therapy Progress Note/ Re-Assessment      Patient: Marilyn Pineda   : 1955  Diagnosis/ICD-10 Code:  SI (sacroiliac) joint dysfunction [M53.3]  Referring practitioner: Abelardo Flannery DO  Date of Initial Visit: 2024  Today's Date: 2024  Patient seen for 9 sessions      Subjective:   Visit Diagnoses:    ICD-10-CM ICD-9-CM   1. SI (sacroiliac) joint dysfunction  M53.3 724.6       Marilyn Pineda reports: getting relief with use of US/ phono to R SIJ. Pain relief lasts ~2 days post session. Reports compliance with HEP.  Subjective Questionnaire: Oswestry: 38  Clinical Progress: improved  Home Program Compliance: Yes  Treatment has included: therapeutic exercise, neuromuscular re-education, manual therapy, therapeutic activity, electrical stimulation, ultrasound, and moist heat    Subjective Evaluation    Pain  At worst pain ratin  Location: R sided low back         Objective          Active Range of Motion     Lumbar   Flexion: 100 degrees   Extension: 5 degrees     Strength/Myotome Testing     Left Hip   Planes of Motion   Flexion: 4-  Extension: 4-  Abduction: 4-  Adduction: 4-    Right Hip   Planes of Motion   Flexion: 3+  Extension: 3+  Abduction: 4-  Adduction: 4-    Additional Strength Details  Lower abdominals 2/5      Assessment & Plan       Assessment  Impairments: abnormal or restricted ROM, impaired physical strength, lacks appropriate home exercise program and pain with function   Functional limitations: lifting, sleeping, walking, sitting, standing and unable to perform repetitive tasks   Assessment details: Pt is making good progress towards all goals and reports much improved pain with use of US/ phono treatment. Pt with good understanding of current HEP. Weakness persists along with pain with bed mobility. Pt would benefit from skilled PT to address the above findings and improve pt's ease with functional mobility and return to PLOF.    Barriers to therapy:  None  Prognosis: good    Goals  Plan Goals: ST.Pt will report reduction in worst pain level to 6/10 in 2 weeks. PROGRESSING  2.Pt will improve AROM of lumbar spine flex to 100 deg in 2 weeks. MET  3. Pt will demo improved postural awareness and correction in 2 weeks for decreased spinal stress. MET  4. Pt will demo good tolerance and compliance with HEP in 2 weeks. MET    LT.Pt will be independent with HEP in 6 weeks for self management and prevention of re-occurrence.  PROGRESSING  2.Pt will improve strength of B hip mm to grossly 4/5 in 6 weeks. PROGRESSING  3. Pt will improve lower abdominal strength to grossly 3+/5 in 6 weeks. PROGRESSING  4.Pt will improve AROM of lumbar spine to WFL in 6 weeks. PROGRESSING  5.Pt will report reduction in worst pain level to 4/10 in 6 weeks. PROGRESSING  6. Pt will improve modified oswestry score to 20% in 6 weeks. PROGRESSING    Plan  Therapy options: will be seen for skilled therapy services  Planned modality interventions: cryotherapy, thermotherapy (hydrocollator packs), electrical stimulation/Russian stimulation and traction  Planned therapy interventions: abdominal trunk stabilization, body mechanics training, flexibility, functional ROM exercises, home exercise program, manual therapy, neuromuscular re-education, postural training, soft tissue mobilization, spinal/joint mobilization, strengthening, stretching and therapeutic activities  Frequency: 2x week  Duration in weeks: 6  Treatment plan discussed with: patient    Progress toward previous goals: Partially Met        Recommendations: Continue as planned  Timeframe: 6 weeks  Prognosis to achieve goals: good      Timed:  Manual Therapy:    15     mins  85807;  Therapeutic Exercise:    15     mins  39600;     Ultrasound:     8     mins  04351;        Untimed:  Electrical Stimulation:    15     mins  10625 ( );      Timed Treatment:   38   mins   Total Treatment:     53   mins    PT Signature: Olivia MCDONALD  Brenda PT, DPT  PT license: [place] [id number]

## 2024-04-29 ENCOUNTER — TREATMENT (OUTPATIENT)
Dept: PHYSICAL THERAPY | Facility: CLINIC | Age: 69
End: 2024-04-29
Payer: MEDICARE

## 2024-04-29 DIAGNOSIS — M53.3 SI (SACROILIAC) JOINT DYSFUNCTION: Primary | ICD-10-CM

## 2024-04-29 PROCEDURE — G0283 ELEC STIM OTHER THAN WOUND: HCPCS | Performed by: PHYSICAL THERAPIST

## 2024-04-29 PROCEDURE — 97035 APP MDLTY 1+ULTRASOUND EA 15: CPT | Performed by: PHYSICAL THERAPIST

## 2024-04-29 PROCEDURE — 97140 MANUAL THERAPY 1/> REGIONS: CPT | Performed by: PHYSICAL THERAPIST

## 2024-04-29 PROCEDURE — 97110 THERAPEUTIC EXERCISES: CPT | Performed by: PHYSICAL THERAPIST

## 2024-04-29 NOTE — PROGRESS NOTES
Physical Therapy Daily Progress Note      Patient: Marilyn Pineda   : 1955  Diagnosis/ICD-10 Code:  SI (sacroiliac) joint dysfunction [M53.3]  Referring practitioner: Abelardo Flannery DO  Date of Initial Visit: Type: THERAPY  Noted: 3/26/2024  Today's Date: 2024  Patient seen for 10 sessions             Subjective Feeling much better than when she first came to therapy.  She perceives about 60% improvement.  U/S (phono) continues to help, along with manual techniques and stim/MH.    Objective   See Exercise, Manual, and Modality Logs for complete treatment.       Assessment/Plan  Responding well to PT intervention.  Continue.               Timed:         Manual Therapy:    15     mins  36050;     Therapeutic Exercise:    15     mins  68770;     Ultrasound:     8     mins  19598;        Un-Timed:  Electrical Stimulation:    15     mins  69882 ( );      Timed Treatment:   38   mins   Total Treatment:     53   mins        Donaldo Dejesus PTA  Physical Therapist Assistant

## 2024-05-01 ENCOUNTER — TREATMENT (OUTPATIENT)
Dept: PHYSICAL THERAPY | Facility: CLINIC | Age: 69
End: 2024-05-01
Payer: MEDICARE

## 2024-05-01 DIAGNOSIS — M53.3 SI (SACROILIAC) JOINT DYSFUNCTION: Primary | ICD-10-CM

## 2024-05-01 NOTE — PROGRESS NOTES
Physical Therapy Daily Treatment Note  Visit: 11        Patient: Marilyn Pineda   : 1955  Diagnosis/ICD-10 Code:  SI (sacroiliac) joint dysfunction [M53.3]  Referring practitioner: Abelardo Flannery DO  Date of Initial Visit: Type: THERAPY  Noted: 3/26/2024  Today's Date: 2024  Patient seen for 11 sessions       Visit Diagnoses:    ICD-10-CM ICD-9-CM   1. SI (sacroiliac) joint dysfunction  M53.3 724.6       Subjective     Marilyn Pineda reports: current POC is helping her pain in her back.     Objective   See Exercise, Manual, and Modality Logs for complete treatment.       Assessment/Plan  Cont STM, joint mobilizations, and passive stretching to R SIJ with good tolerance and response. Pt demo compliance with HEP and progression towards goals.     Progress per Plan of Care.         Timed:  Manual Therapy:    15     mins  97597;  Therapeutic Exercise:    15     mins  03041;     Ultrasound:     8     mins  54848;      Untimed:  Electrical Stimulation:    15     mins  58937 ( );      Timed Treatment:   38   mins   Total Treatment:     53   mins        Olivia Gutierrez, PT, DPT  Physical Therapist  PT license: [place] [id number]

## 2024-05-06 ENCOUNTER — TREATMENT (OUTPATIENT)
Dept: PHYSICAL THERAPY | Facility: CLINIC | Age: 69
End: 2024-05-06
Payer: MEDICARE

## 2024-05-06 DIAGNOSIS — M53.3 SI (SACROILIAC) JOINT DYSFUNCTION: Primary | ICD-10-CM

## 2024-05-06 PROCEDURE — 97035 APP MDLTY 1+ULTRASOUND EA 15: CPT | Performed by: PHYSICAL THERAPIST

## 2024-05-06 PROCEDURE — 97110 THERAPEUTIC EXERCISES: CPT | Performed by: PHYSICAL THERAPIST

## 2024-05-06 PROCEDURE — 97140 MANUAL THERAPY 1/> REGIONS: CPT | Performed by: PHYSICAL THERAPIST

## 2024-05-06 PROCEDURE — G0283 ELEC STIM OTHER THAN WOUND: HCPCS | Performed by: PHYSICAL THERAPIST

## 2024-05-15 ENCOUNTER — TREATMENT (OUTPATIENT)
Dept: PHYSICAL THERAPY | Facility: CLINIC | Age: 69
End: 2024-05-15
Payer: MEDICARE

## 2024-05-15 DIAGNOSIS — M53.3 SI (SACROILIAC) JOINT DYSFUNCTION: Primary | ICD-10-CM

## 2024-05-15 PROCEDURE — 97035 APP MDLTY 1+ULTRASOUND EA 15: CPT | Performed by: PHYSICAL THERAPIST

## 2024-05-15 PROCEDURE — 97110 THERAPEUTIC EXERCISES: CPT | Performed by: PHYSICAL THERAPIST

## 2024-05-15 PROCEDURE — 97112 NEUROMUSCULAR REEDUCATION: CPT | Performed by: PHYSICAL THERAPIST

## 2024-05-15 NOTE — PROGRESS NOTES
Physical Therapy Progress Note/ Re-Assessment      Patient: Marilyn Pineda   : 1955  Diagnosis/ICD-10 Code:  SI (sacroiliac) joint dysfunction [M53.3]  Referring practitioner: Abelardo Flannery DO  Date of Initial Visit: 5/15/2024  Today's Date: 5/15/2024  Patient seen for 13 sessions      Subjective:   Visit Diagnoses:    ICD-10-CM ICD-9-CM   1. SI (sacroiliac) joint dysfunction  M53.3 724.6       Marilyn Pineda reports: low back pain is improving. Dull ache on R SIJ when walking prolonged distances, but has been able to walk further before needing to rest. Pain into her R leg has improved.   Subjective Questionnaire: Oswestry: 26  Clinical Progress: improved  Home Program Compliance: Yes  Treatment has included: therapeutic exercise, neuromuscular re-education, manual therapy, therapeutic activity, gait training, electrical stimulation, ultrasound, and cryotherapy    Subjective Evaluation    Pain  At worst pain ratin  Location: R SIJ         Objective          Tenderness     Right Hip   Tenderness in the PSIS.     Active Range of Motion     Lumbar   Flexion: 100 degrees   Extension: 10 degrees     Strength/Myotome Testing     Left Hip   Planes of Motion   Flexion: 4  Extension: 4  Abduction: 4  Adduction: 4    Right Hip   Planes of Motion   Flexion: 3  Extension: 3+  Abduction: 3+  Adduction: 3+    Additional Strength Details  Lower abdominals 2+/5    Functional Assessment     Comments  Fair static balance  Poor dynamic balance       Assessment & Plan       Assessment  Impairments: abnormal or restricted ROM, impaired physical strength, lacks appropriate home exercise program and pain with function   Functional limitations: lifting, sleeping, walking, sitting, standing and unable to perform repetitive tasks   Assessment details: Pt has attended 13 visits in skilled PT. She has made good progress towards all goals and reports decreased R sided hip and low back pain and improved ease with walking and  daily activities. Lumbar AROM has improved and strength is progressing towards goals. She demo balance deficits this date. Pt would benefit from skilled PT to address the above findings and improve pt's ease with functional mobility and return to PLOF.    Barriers to therapy: None  Prognosis: good    Goals  Plan Goals: ST.Pt will report reduction in worst pain level to 6/10 in 2 weeks. MET  2.Pt will improve AROM of lumbar spine flex to 100 deg in 2 weeks. MET  3. Pt will demo improved postural awareness and correction in 2 weeks for decreased spinal stress. MET  4. Pt will demo good tolerance and compliance with HEP in 2 weeks. MET    LT.Pt will be independent with HEP in 6 weeks for self management and prevention of re-occurrence. PROGRESSING  2.Pt will improve strength of B hip mm to grossly 4/5 in 6 weeks. PROGRESSING  3. Pt will improve lower abdominal strength to grossly 3+/5 in 6 weeks. PROGRESSING  4.Pt will improve AROM of lumbar spine to WFL in 6 weeks. PROGRESSING  5.Pt will report reduction in worst pain level to 4/10 in 6 weeks. PROGRESSING  6. Pt will improve modified oswestry score to 20% in 6 weeks. PROGRESSING    Plan  Therapy options: will be seen for skilled therapy services  Planned modality interventions: cryotherapy, thermotherapy (hydrocollator packs), electrical stimulation/Russian stimulation and traction  Planned therapy interventions: abdominal trunk stabilization, body mechanics training, flexibility, functional ROM exercises, home exercise program, manual therapy, neuromuscular re-education, postural training, soft tissue mobilization, spinal/joint mobilization, strengthening, stretching and therapeutic activities  Frequency: 2x week  Duration in weeks: 6  Treatment plan discussed with: patient      Progress toward previous goals: Partially Met        Recommendations: Continue as planned        Timed:  Therapeutic Exercise:    15     mins  08749;     Neuromuscular Karina:    15     mins  84763;    Ultrasound:     8     mins  92837;        Timed Treatment:   38   mins   Total Treatment:     38   mins    PT Signature: Olivia Gutierrez PT, DPT  PT license: [place] [id number]

## 2024-05-22 ENCOUNTER — TREATMENT (OUTPATIENT)
Dept: PHYSICAL THERAPY | Facility: CLINIC | Age: 69
End: 2024-05-22
Payer: MEDICARE

## 2024-05-22 DIAGNOSIS — M53.3 SI (SACROILIAC) JOINT DYSFUNCTION: Primary | ICD-10-CM

## 2024-05-22 PROCEDURE — 97110 THERAPEUTIC EXERCISES: CPT | Performed by: PHYSICAL THERAPIST

## 2024-05-22 PROCEDURE — 97530 THERAPEUTIC ACTIVITIES: CPT | Performed by: PHYSICAL THERAPIST

## 2024-05-22 PROCEDURE — 97035 APP MDLTY 1+ULTRASOUND EA 15: CPT | Performed by: PHYSICAL THERAPIST

## 2024-05-22 NOTE — PROGRESS NOTES
Physical Therapy Daily Treatment Note  Visit: 14        Patient: Marilyn Pineda   : 1955  Diagnosis/ICD-10 Code:  SI (sacroiliac) joint dysfunction [M53.3]  Referring practitioner: Abelardo Flannery DO  Date of Initial Visit: Type: THERAPY  Noted: 3/26/2024  Today's Date: 2024  Patient seen for 14 sessions       Visit Diagnoses:    ICD-10-CM ICD-9-CM   1. SI (sacroiliac) joint dysfunction  M53.3 724.6       Subjective     Marilyn Pineda reports: soreness in low back after prolonged bending over to garden yesterday.     Objective   See Exercise, Manual, and Modality Logs for complete treatment.       Assessment/Plan  Advised on techniques to avoid extra stress to lumbar spine while gardening including squatting or use of gardening stool. Cont use of phono to L SIJ and stretching with good tolerance.     Progress per Plan of Care.         Timed:  Therapeutic Exercise:    15     mins  96130;     Therapeutic Activity:     15     mins  56582;     Ultrasound:     8     mins  88494;        Timed Treatment:   38   mins   Total Treatment:     38   mins        Olivia Gutierrez PT, DPT  Physical Therapist  PT license: [place] [id number]

## 2024-05-24 ENCOUNTER — LAB (OUTPATIENT)
Dept: FAMILY MEDICINE CLINIC | Facility: CLINIC | Age: 69
End: 2024-05-24
Payer: MEDICARE

## 2024-05-24 ENCOUNTER — OFFICE VISIT (OUTPATIENT)
Dept: FAMILY MEDICINE CLINIC | Facility: CLINIC | Age: 69
End: 2024-05-24
Payer: MEDICARE

## 2024-05-24 VITALS
RESPIRATION RATE: 20 BRPM | BODY MASS INDEX: 33.32 KG/M2 | HEIGHT: 65 IN | SYSTOLIC BLOOD PRESSURE: 164 MMHG | OXYGEN SATURATION: 100 % | DIASTOLIC BLOOD PRESSURE: 98 MMHG | HEART RATE: 82 BPM | WEIGHT: 200 LBS

## 2024-05-24 DIAGNOSIS — M21.961 ACQUIRED DEFORMITY OF RIGHT FOOT: ICD-10-CM

## 2024-05-24 DIAGNOSIS — R60.0 EDEMA OF RIGHT LOWER LEG: ICD-10-CM

## 2024-05-24 DIAGNOSIS — Z78.0 POSTMENOPAUSE: ICD-10-CM

## 2024-05-24 DIAGNOSIS — R53.83 FATIGUE, UNSPECIFIED TYPE: ICD-10-CM

## 2024-05-24 DIAGNOSIS — Z00.00 ENCOUNTER FOR ANNUAL PHYSICAL EXAM: ICD-10-CM

## 2024-05-24 DIAGNOSIS — D50.9 IRON DEFICIENCY ANEMIA, UNSPECIFIED IRON DEFICIENCY ANEMIA TYPE: ICD-10-CM

## 2024-05-24 DIAGNOSIS — L67.9 HAIR CHANGES: ICD-10-CM

## 2024-05-24 DIAGNOSIS — F32.A ANXIETY AND DEPRESSION: ICD-10-CM

## 2024-05-24 DIAGNOSIS — Z00.00 HEALTHCARE MAINTENANCE: Primary | ICD-10-CM

## 2024-05-24 DIAGNOSIS — G62.9 PERIPHERAL POLYNEUROPATHY: ICD-10-CM

## 2024-05-24 DIAGNOSIS — Z86.2 HX OF IRON DEFICIENCY ANEMIA: ICD-10-CM

## 2024-05-24 DIAGNOSIS — F41.9 ANXIETY AND DEPRESSION: ICD-10-CM

## 2024-05-24 DIAGNOSIS — M79.18 DIFFUSE MYOFASCIAL PAIN SYNDROME: ICD-10-CM

## 2024-05-24 DIAGNOSIS — Z12.11 SCREEN FOR COLON CANCER: ICD-10-CM

## 2024-05-24 PROBLEM — M79.89 SWELLING OF LOWER EXTREMITY: Status: ACTIVE | Noted: 2018-09-10

## 2024-05-24 PROBLEM — J18.9 RIGHT LOWER LOBE PNEUMONIA: Status: RESOLVED | Noted: 2018-01-09 | Resolved: 2024-05-24

## 2024-05-24 PROBLEM — B37.0 THRUSH, ORAL: Status: RESOLVED | Noted: 2018-01-18 | Resolved: 2024-05-24

## 2024-05-24 PROBLEM — M25.551 PAIN OF RIGHT HIP JOINT: Status: RESOLVED | Noted: 2018-01-09 | Resolved: 2024-05-24

## 2024-05-24 LAB
T4 FREE SERPL-MCNC: 1.18 NG/DL (ref 0.93–1.7)
TSH SERPL DL<=0.05 MIU/L-ACNC: 3.32 UIU/ML (ref 0.27–4.2)

## 2024-05-24 PROCEDURE — 82306 VITAMIN D 25 HYDROXY: CPT | Performed by: STUDENT IN AN ORGANIZED HEALTH CARE EDUCATION/TRAINING PROGRAM

## 2024-05-24 PROCEDURE — 84439 ASSAY OF FREE THYROXINE: CPT | Performed by: STUDENT IN AN ORGANIZED HEALTH CARE EDUCATION/TRAINING PROGRAM

## 2024-05-24 PROCEDURE — 83036 HEMOGLOBIN GLYCOSYLATED A1C: CPT | Performed by: STUDENT IN AN ORGANIZED HEALTH CARE EDUCATION/TRAINING PROGRAM

## 2024-05-24 PROCEDURE — 83540 ASSAY OF IRON: CPT | Performed by: STUDENT IN AN ORGANIZED HEALTH CARE EDUCATION/TRAINING PROGRAM

## 2024-05-24 PROCEDURE — 85025 COMPLETE CBC W/AUTO DIFF WBC: CPT | Performed by: STUDENT IN AN ORGANIZED HEALTH CARE EDUCATION/TRAINING PROGRAM

## 2024-05-24 PROCEDURE — 82607 VITAMIN B-12: CPT | Performed by: STUDENT IN AN ORGANIZED HEALTH CARE EDUCATION/TRAINING PROGRAM

## 2024-05-24 PROCEDURE — 84466 ASSAY OF TRANSFERRIN: CPT | Performed by: STUDENT IN AN ORGANIZED HEALTH CARE EDUCATION/TRAINING PROGRAM

## 2024-05-24 PROCEDURE — 84443 ASSAY THYROID STIM HORMONE: CPT | Performed by: STUDENT IN AN ORGANIZED HEALTH CARE EDUCATION/TRAINING PROGRAM

## 2024-05-24 PROCEDURE — 36415 COLL VENOUS BLD VENIPUNCTURE: CPT

## 2024-05-24 PROCEDURE — 86803 HEPATITIS C AB TEST: CPT | Performed by: STUDENT IN AN ORGANIZED HEALTH CARE EDUCATION/TRAINING PROGRAM

## 2024-05-24 PROCEDURE — 82728 ASSAY OF FERRITIN: CPT | Performed by: STUDENT IN AN ORGANIZED HEALTH CARE EDUCATION/TRAINING PROGRAM

## 2024-05-24 PROCEDURE — 80053 COMPREHEN METABOLIC PANEL: CPT | Performed by: STUDENT IN AN ORGANIZED HEALTH CARE EDUCATION/TRAINING PROGRAM

## 2024-05-24 PROCEDURE — 80061 LIPID PANEL: CPT | Performed by: STUDENT IN AN ORGANIZED HEALTH CARE EDUCATION/TRAINING PROGRAM

## 2024-05-24 RX ORDER — LANOLIN ALCOHOL/MO/W.PET/CERES
100 CREAM (GRAM) TOPICAL DAILY
COMMUNITY

## 2024-05-24 RX ORDER — CHOLECALCIFEROL (VITAMIN D3) 25 MCG
500 TABLET,CHEWABLE ORAL DAILY
COMMUNITY

## 2024-05-24 RX ORDER — MULTIVIT,IRON,MINERALS/LUTEIN
1 TABLET ORAL DAILY
COMMUNITY

## 2024-05-24 NOTE — PROGRESS NOTES
"[facility]   05/24/2024    Patient Name: Marilyn Pineda   YOB: 1955   Age/Sex: 68 y.o. female   Medical Record Number: [id number]   Primary Care Physician: Cinthia Brown MD     Subjective     Chief Complaint     Chief Complaint   Patient presents with    Establish Care       History of Present Illness         SI Joint Pain: seen in ER on 3/12/24, received Decadron and Toradol IM and referred to PT. Still going to PT    R foot deformity; genu al      Hx of peripheral neuropathy:     IRISH: seem hematology?    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 Pertinent findings are mentioned in the HPI, with no additional significant findings beyond those already noted.      Patient History   The following portions of the patient's history were reviewed and updated as appropriate:   allergies, current medications, problem list, PMHx, PSHx, PFHx, past social history    Objective     Vitals:    05/24/24 1025 05/24/24 1218   BP: 162/100 164/98   Pulse: 82    Resp: 20    SpO2: 100%    Weight: 90.7 kg (200 lb)    Height: 165.1 cm (65\")       BMI Readings from Last 3 Encounters:   05/24/24 33.28 kg/m²   03/12/24 35.33 kg/m²   02/01/22 34.95 kg/m²              Physical Exam "   Constitutional: Alert, well-appearing, no acute distress  HENT: NCAT, mucous membranes moist  Neck: Supple, no thyromegaly  Respiratory: No respiratory distress, good effort and air entry, clear to auscultation bilaterally   Cardiovascular: RRR, no LE edema  Psychiatric: Appropriate mood and affect, cooperative  Skin: No apparent rashes or lesions            Assessment & Plan     Diagnoses and all orders for this visit:    Healthcare maintenance  -     Comprehensive Metabolic Panel; Future  -     Hemoglobin A1c; Future  -     Lipid Panel; Future  -     Hepatitis C Antibody; Future    Hair changes  -     CBC Auto Differential; Future  -     TSH+Free T4; Future    Postmenopause  -     Vitamin D,25-Hydroxy; Future    Fatigue, unspecified type  -     TSH+Free T4; Future  -     Vitamin D,25-Hydroxy; Future  -     Vitamin B12; Future  -     DULoxetine (CYMBALTA) 30 MG capsule; Take 1 capsule by mouth Daily. Indications: Generalized Anxiety Disorder, Major Depressive Disorder, Musculoskeletal Pain, Peripheral Nerve Disease    Iron deficiency anemia, unspecified iron deficiency anemia type  -     Iron and TIBC; Future  -     Ferritin; Future    Body mass index (BMI) 33.0-33.9, adult  -     Vitamin D,25-Hydroxy; Future    Screen for colon cancer  -     Cologuard - Stool, Per Rectum; Future    Peripheral polyneuropathy  -     DULoxetine (CYMBALTA) 30 MG capsule; Take 1 capsule by mouth Daily. Indications: Generalized Anxiety Disorder, Major Depressive Disorder, Musculoskeletal Pain, Peripheral Nerve Disease    Diffuse myofascial pain syndrome  -     DULoxetine (CYMBALTA) 30 MG capsule; Take 1 capsule by mouth Daily. Indications: Generalized Anxiety Disorder, Major Depressive Disorder, Musculoskeletal Pain, Peripheral Nerve Disease    Anxiety and depression  -     DULoxetine (CYMBALTA) 30 MG capsule; Take 1 capsule by mouth Daily. Indications: Generalized Anxiety Disorder, Major Depressive Disorder, Musculoskeletal Pain,  Peripheral Nerve Disease    Acquired deformity of right foot  -     Ambulatory Referral to Podiatry    Edema of right lower leg  -     US Venous Doppler Lower Extremity Bilateral (duplex); Future    Other orders  -     multivitamin with minerals (MULTIVITAMIN WOMEN PO); Take 1 tablet by mouth Daily.  -     Multiple Minerals-Vitamins (Calcium-Magnesium-Zinc-D3) tablet; Take 1 tablet by mouth Daily.  -     BIOTIN PO; Take 1 tablet by mouth Daily.  -     cyanocobalamin (VITAMIN B-12) 2500 MCG tablet tablet; Take 500 mcg by mouth Daily.  -     Ferrous Sulfate (IRON PO); Take 65 mg by mouth Daily.  -     vitamin B-6 (PYRIDOXINE) 50 MG tablet; Take 2 tablets by mouth Daily.  -     Misc Natural Products (BEET ROOT PO); Take 500 mg by mouth Daily.            Orders Placed This Encounter   Procedures    US Venous Doppler Lower Extremity Bilateral (duplex)    CBC Auto Differential    Comprehensive Metabolic Panel    Hemoglobin A1c    Lipid Panel    Hepatitis C Antibody    TSH+Free T4    Vitamin D,25-Hydroxy    Vitamin B12    Cologuard - Stool, Per Rectum    Iron and TIBC    Ferritin    Ambulatory Referral to Podiatry          Follow Up   Return in about 4 months (around 9/24/2024) for Medicare Wellness.  Patient was given instructions and counseling regarding her condition or for health maintenance advice. Please see specific information pulled into the AVS if appropriate.     This medical documentation was produced in part using speech recognition software (Dragon Dictation System) and may contain errors related to that system including errors in grammar, punctuation, and spelling, as well as words and phrases that may be inappropriate and not intentional --- If there are any questions or concerns please feel free to contact me, the dictating provider, for clarification.        Tristan Marshall valgum/flat foot

## 2024-05-25 LAB
25(OH)D3 SERPL-MCNC: 69 NG/ML (ref 30–100)
ALBUMIN SERPL-MCNC: 3.9 G/DL (ref 3.5–5.2)
ALBUMIN/GLOB SERPL: 0.8 G/DL
ALP SERPL-CCNC: 77 U/L (ref 39–117)
ALT SERPL W P-5'-P-CCNC: 9 U/L (ref 1–33)
ANION GAP SERPL CALCULATED.3IONS-SCNC: 12.6 MMOL/L (ref 5–15)
AST SERPL-CCNC: 16 U/L (ref 1–32)
BASOPHILS # BLD AUTO: 0.07 10*3/MM3 (ref 0–0.2)
BASOPHILS NFR BLD AUTO: 0.7 % (ref 0–1.5)
BILIRUB SERPL-MCNC: 0.4 MG/DL (ref 0–1.2)
BUN SERPL-MCNC: 13 MG/DL (ref 8–23)
BUN/CREAT SERPL: 14.4 (ref 7–25)
CALCIUM SPEC-SCNC: 9.7 MG/DL (ref 8.6–10.5)
CHLORIDE SERPL-SCNC: 101 MMOL/L (ref 98–107)
CHOLEST SERPL-MCNC: 183 MG/DL (ref 0–200)
CO2 SERPL-SCNC: 25.4 MMOL/L (ref 22–29)
CREAT SERPL-MCNC: 0.9 MG/DL (ref 0.57–1)
DEPRECATED RDW RBC AUTO: 45.2 FL (ref 37–54)
EGFRCR SERPLBLD CKD-EPI 2021: 70.2 ML/MIN/1.73
EOSINOPHIL # BLD AUTO: 0.28 10*3/MM3 (ref 0–0.4)
EOSINOPHIL NFR BLD AUTO: 2.8 % (ref 0.3–6.2)
ERYTHROCYTE [DISTWIDTH] IN BLOOD BY AUTOMATED COUNT: 14.8 % (ref 12.3–15.4)
FERRITIN SERPL-MCNC: 569 NG/ML (ref 13–150)
GLOBULIN UR ELPH-MCNC: 4.8 GM/DL
GLUCOSE SERPL-MCNC: 73 MG/DL (ref 65–99)
HBA1C MFR BLD: 5.2 % (ref 4.8–5.6)
HCT VFR BLD AUTO: 28.8 % (ref 34–46.6)
HCV AB SER QL: NORMAL
HDLC SERPL-MCNC: 64 MG/DL (ref 40–60)
HGB BLD-MCNC: 8.7 G/DL (ref 12–15.9)
IMM GRANULOCYTES # BLD AUTO: 0.1 10*3/MM3 (ref 0–0.05)
IMM GRANULOCYTES NFR BLD AUTO: 1 % (ref 0–0.5)
IRON 24H UR-MRATE: 32 MCG/DL (ref 37–145)
IRON SATN MFR SERPL: 13 % (ref 20–50)
LDLC SERPL CALC-MCNC: 100 MG/DL (ref 0–100)
LDLC/HDLC SERPL: 1.53 {RATIO}
LYMPHOCYTES # BLD AUTO: 1.65 10*3/MM3 (ref 0.7–3.1)
LYMPHOCYTES NFR BLD AUTO: 16.6 % (ref 19.6–45.3)
MCH RBC QN AUTO: 25.5 PG (ref 26.6–33)
MCHC RBC AUTO-ENTMCNC: 30.2 G/DL (ref 31.5–35.7)
MCV RBC AUTO: 84.5 FL (ref 79–97)
MONOCYTES # BLD AUTO: 0.59 10*3/MM3 (ref 0.1–0.9)
MONOCYTES NFR BLD AUTO: 5.9 % (ref 5–12)
NEUTROPHILS NFR BLD AUTO: 7.25 10*3/MM3 (ref 1.7–7)
NEUTROPHILS NFR BLD AUTO: 73 % (ref 42.7–76)
NRBC BLD AUTO-RTO: 0 /100 WBC (ref 0–0.2)
PLATELET # BLD AUTO: 632 10*3/MM3 (ref 140–450)
PMV BLD AUTO: 8.7 FL (ref 6–12)
POTASSIUM SERPL-SCNC: 4.4 MMOL/L (ref 3.5–5.2)
PROT SERPL-MCNC: 8.7 G/DL (ref 6–8.5)
RBC # BLD AUTO: 3.41 10*6/MM3 (ref 3.77–5.28)
SODIUM SERPL-SCNC: 139 MMOL/L (ref 136–145)
TIBC SERPL-MCNC: 238 MCG/DL (ref 298–536)
TRANSFERRIN SERPL-MCNC: 160 MG/DL (ref 200–360)
TRIGL SERPL-MCNC: 104 MG/DL (ref 0–150)
VIT B12 BLD-MCNC: 981 PG/ML (ref 211–946)
VLDLC SERPL-MCNC: 19 MG/DL (ref 5–40)
WBC NRBC COR # BLD AUTO: 9.94 10*3/MM3 (ref 3.4–10.8)

## 2024-05-28 ENCOUNTER — TREATMENT (OUTPATIENT)
Dept: PHYSICAL THERAPY | Facility: CLINIC | Age: 69
End: 2024-05-28
Payer: MEDICARE

## 2024-05-28 DIAGNOSIS — M53.3 SI (SACROILIAC) JOINT DYSFUNCTION: Primary | ICD-10-CM

## 2024-05-28 PROCEDURE — 97110 THERAPEUTIC EXERCISES: CPT | Performed by: PHYSICAL THERAPIST

## 2024-05-28 PROCEDURE — 97530 THERAPEUTIC ACTIVITIES: CPT | Performed by: PHYSICAL THERAPIST

## 2024-05-28 PROCEDURE — 97035 APP MDLTY 1+ULTRASOUND EA 15: CPT | Performed by: PHYSICAL THERAPIST

## 2024-05-28 NOTE — PROGRESS NOTES
Physical Therapy Daily Treatment Note  Visit: 15        Patient: Marilyn Pineda   : 1955  Diagnosis/ICD-10 Code:  SI (sacroiliac) joint dysfunction [M53.3]  Referring practitioner: Abelardo Flannery DO  Date of Initial Visit: Type: THERAPY  Noted: 3/26/2024  Today's Date: 2024  Patient seen for 15 sessions       Visit Diagnoses:    ICD-10-CM ICD-9-CM   1. SI (sacroiliac) joint dysfunction  M53.3 724.6       Subjective     Marilyn Pineda reports: improved pain in R hip and low back.     Objective   See Exercise, Manual, and Modality Logs for complete treatment.       Assessment/Plan  Pending progress this week, plan to start d/c planning. Pt cont to respond well with phono, stretching, and core stabilization exercises. No adverse reaction to tx session.     Progress per Plan of Care.         Timed:  Therapeutic Exercise:    15     mins  47709;     Therapeutic Activity:     15     mins  30339;     Ultrasound:     8     mins  50306;        Timed Treatment:   38   mins   Total Treatment:     38   mins        Olivia Gutierrez PT, DPT  Physical Therapist  PT license: [place] [id number]

## 2024-05-29 ENCOUNTER — TELEPHONE (OUTPATIENT)
Dept: FAMILY MEDICINE CLINIC | Facility: CLINIC | Age: 69
End: 2024-05-29

## 2024-05-29 NOTE — TELEPHONE ENCOUNTER
Caller: Marilyn Pineda    Relationship: Self    Best call back number:     Marilyn Pineda (Self) [phone] (Mobile)       Caller requesting test results:     What test was performed: LABS    When was the test performed: 5-24    Where was the test performed: OFFICE

## 2024-05-30 PROBLEM — F32.A ANXIETY AND DEPRESSION: Status: ACTIVE | Noted: 2024-05-24

## 2024-05-30 PROBLEM — F41.9 ANXIETY AND DEPRESSION: Status: ACTIVE | Noted: 2024-05-30

## 2024-05-30 PROBLEM — F41.9 ANXIETY AND DEPRESSION: Status: ACTIVE | Noted: 2024-05-24

## 2024-05-30 PROBLEM — F32.A ANXIETY AND DEPRESSION: Status: ACTIVE | Noted: 2024-05-30

## 2024-05-30 RX ORDER — DULOXETIN HYDROCHLORIDE 30 MG/1
30 CAPSULE, DELAYED RELEASE ORAL DAILY
Qty: 30 CAPSULE | Refills: 3 | Status: SHIPPED | OUTPATIENT
Start: 2024-05-30

## 2024-05-30 NOTE — TELEPHONE ENCOUNTER
Called patient to discuss lab results. She was unable to talk at that time so I will call back later this afternoon per her request.

## 2024-06-05 ENCOUNTER — TREATMENT (OUTPATIENT)
Dept: PHYSICAL THERAPY | Facility: CLINIC | Age: 69
End: 2024-06-05
Payer: MEDICARE

## 2024-06-05 DIAGNOSIS — M53.3 SI (SACROILIAC) JOINT DYSFUNCTION: Primary | ICD-10-CM

## 2024-06-05 NOTE — PROGRESS NOTES
Physical Therapy Daily Treatment Note  Visit: 16        Patient: Marilyn Pineda   : 1955  Diagnosis/ICD-10 Code:  SI (sacroiliac) joint dysfunction [M53.3]  Referring practitioner: Abelardo Flannery DO  Date of Initial Visit: Type: THERAPY  Noted: 3/26/2024  Today's Date: 2024  Patient seen for 16 sessions       Visit Diagnoses:    ICD-10-CM ICD-9-CM   1. SI (sacroiliac) joint dysfunction  M53.3 724.6       Subjective     Marilyn Pineda reports: slipping and falling over the weekend. States her back has been bothering her again since.    Objective   See Exercise, Manual, and Modality Logs for complete treatment.       Assessment/Plan  Good response to stretching and modalities for pain relief. Pt scheduled for one more visit pending pain relief over the next week.     Progress per Plan of Care.         Timed:  Therapeutic Exercise:    15     mins  56087;     Therapeutic Activity:     15     mins  96591;     Ultrasound:     8     mins  74671;        Timed Treatment:   38   mins   Total Treatment:     38   mins        Olivia Gutierrez PT, DPT  Physical Therapist  PT license: [place] [id number]

## 2024-06-06 ENCOUNTER — TELEPHONE (OUTPATIENT)
Dept: FAMILY MEDICINE CLINIC | Facility: CLINIC | Age: 69
End: 2024-06-06
Payer: MEDICARE

## 2024-06-06 DIAGNOSIS — R60.0 EDEMA OF RIGHT LOWER LEG: Primary | ICD-10-CM

## 2024-06-06 NOTE — TELEPHONE ENCOUNTER
Cinthia, This is what they sent me about this patient     6/6 - please put in new order vascular venous doppler lower bilateral         Thanks, Tequila   
What Type Of Note Output Would You Prefer (Optional)?: Bullet Format
Hpi Title: Evaluation of Skin Lesions
Have Your Spot(S) Been Treated In The Past?: has not been treated

## 2024-06-12 ENCOUNTER — TREATMENT (OUTPATIENT)
Dept: PHYSICAL THERAPY | Facility: CLINIC | Age: 69
End: 2024-06-12
Payer: MEDICARE

## 2024-06-12 DIAGNOSIS — M53.3 SI (SACROILIAC) JOINT DYSFUNCTION: Primary | ICD-10-CM

## 2024-06-12 NOTE — PROGRESS NOTES
Physical Therapy Daily Treatment Note  Visit: 17        Patient: Marilyn Pineda   : 1955  Diagnosis/ICD-10 Code:  SI (sacroiliac) joint dysfunction [M53.3]  Referring practitioner: Abelardo Flannery DO  Date of Initial Visit: Type: THERAPY  Noted: 3/26/2024  Today's Date: 2024  Patient seen for 17 sessions       Visit Diagnoses:    ICD-10-CM ICD-9-CM   1. SI (sacroiliac) joint dysfunction  M53.3 724.6       Subjective     Marilyn Pineda reports: pain in R hip has mostly resolved. Wishes to trial HEP at this time.     Objective   See Exercise, Manual, and Modality Logs for complete treatment.       Assessment/Plan  Pt has responded well to POC and is ready to trial HEP. Pt to call in next 30 days if problems arise, otherwise plan to d/c from formal skilled PT.    Progress per Plan of Care.         Timed:  Therapeutic Exercise:    15     mins  18615;     Ultrasound:     8     mins  65832;        Timed Treatment:   23   mins   Total Treatment:     23   mins        Olivia Gutierrez PT, DPT  Physical Therapist  PT license: [place] [id number]

## 2024-06-17 ENCOUNTER — HOSPITAL ENCOUNTER (OUTPATIENT)
Dept: CARDIOLOGY | Facility: HOSPITAL | Age: 69
Discharge: HOME | End: 2024-06-17
Admitting: STUDENT IN AN ORGANIZED HEALTH CARE EDUCATION/TRAINING PROGRAM
Payer: MEDICARE

## 2024-06-17 DIAGNOSIS — R60.0 EDEMA OF RIGHT LOWER LEG: ICD-10-CM

## 2024-06-17 LAB
BH CV LOWER VASCULAR LEFT COMMON FEMORAL AUGMENT: NORMAL
BH CV LOWER VASCULAR LEFT COMMON FEMORAL COMPETENT: NORMAL
BH CV LOWER VASCULAR LEFT COMMON FEMORAL COMPRESS: NORMAL
BH CV LOWER VASCULAR LEFT COMMON FEMORAL PHASIC: NORMAL
BH CV LOWER VASCULAR LEFT COMMON FEMORAL SPONT: NORMAL
BH CV LOWER VASCULAR RIGHT COMMON FEMORAL AUGMENT: NORMAL
BH CV LOWER VASCULAR RIGHT COMMON FEMORAL COMPETENT: NORMAL
BH CV LOWER VASCULAR RIGHT COMMON FEMORAL COMPRESS: NORMAL
BH CV LOWER VASCULAR RIGHT COMMON FEMORAL PHASIC: NORMAL
BH CV LOWER VASCULAR RIGHT COMMON FEMORAL SPONT: NORMAL
BH CV LOWER VASCULAR RIGHT DISTAL FEMORAL COMPRESS: NORMAL
BH CV LOWER VASCULAR RIGHT GASTRONEMIUS COMPRESS: NORMAL
BH CV LOWER VASCULAR RIGHT GREATER SAPH AK COMPRESS: NORMAL
BH CV LOWER VASCULAR RIGHT GREATER SAPH BK COMPRESS: NORMAL
BH CV LOWER VASCULAR RIGHT LESSER SAPH COMPRESS: NORMAL
BH CV LOWER VASCULAR RIGHT MID FEMORAL AUGMENT: NORMAL
BH CV LOWER VASCULAR RIGHT MID FEMORAL COMPETENT: NORMAL
BH CV LOWER VASCULAR RIGHT MID FEMORAL COMPRESS: NORMAL
BH CV LOWER VASCULAR RIGHT MID FEMORAL PHASIC: NORMAL
BH CV LOWER VASCULAR RIGHT MID FEMORAL SPONT: NORMAL
BH CV LOWER VASCULAR RIGHT PERONEAL COMPRESS: NORMAL
BH CV LOWER VASCULAR RIGHT POPLITEAL AUGMENT: NORMAL
BH CV LOWER VASCULAR RIGHT POPLITEAL COMPETENT: NORMAL
BH CV LOWER VASCULAR RIGHT POPLITEAL COMPRESS: NORMAL
BH CV LOWER VASCULAR RIGHT POPLITEAL PHASIC: NORMAL
BH CV LOWER VASCULAR RIGHT POPLITEAL SPONT: NORMAL
BH CV LOWER VASCULAR RIGHT POSTERIOR TIBIAL COMPRESS: NORMAL
BH CV LOWER VASCULAR RIGHT PROXIMAL FEMORAL COMPRESS: NORMAL
BH CV LOWER VASCULAR RIGHT SAPHENOFEMORAL JUNCTION COMPRESS: NORMAL

## 2024-06-17 PROCEDURE — 93971 EXTREMITY STUDY: CPT | Performed by: SURGERY

## 2024-06-17 PROCEDURE — 93971 EXTREMITY STUDY: CPT

## 2024-07-11 ENCOUNTER — PATIENT MESSAGE (OUTPATIENT)
Dept: FAMILY MEDICINE CLINIC | Facility: CLINIC | Age: 69
End: 2024-07-11
Payer: MEDICARE

## 2024-07-11 DIAGNOSIS — R19.5 POSITIVE COLORECTAL CANCER SCREENING USING COLOGUARD TEST: Primary | ICD-10-CM

## 2024-07-17 ENCOUNTER — OFFICE VISIT (OUTPATIENT)
Dept: PODIATRY | Facility: CLINIC | Age: 69
End: 2024-07-17
Payer: MEDICARE

## 2024-07-17 VITALS — HEART RATE: 83 BPM | WEIGHT: 200 LBS | BODY MASS INDEX: 33.32 KG/M2 | HEIGHT: 65 IN

## 2024-07-17 DIAGNOSIS — M21.861 ACQUIRED POSTERIOR EQUINUS, RIGHT: ICD-10-CM

## 2024-07-17 DIAGNOSIS — M19.071 ARTHRITIS OF MIDTARSAL JOINT OF RIGHT FOOT: ICD-10-CM

## 2024-07-17 DIAGNOSIS — M79.671 RIGHT FOOT PAIN: Primary | ICD-10-CM

## 2024-07-17 DIAGNOSIS — M21.41 ACQUIRED PES PLANUS, RIGHT: ICD-10-CM

## 2024-07-17 DIAGNOSIS — I89.0 LYMPHEDEMA: ICD-10-CM

## 2024-07-17 DIAGNOSIS — M54.31 SCIATICA, RIGHT SIDE: ICD-10-CM

## 2024-07-18 ENCOUNTER — PATIENT ROUNDING (BHMG ONLY) (OUTPATIENT)
Dept: ORTHOPEDIC SURGERY | Facility: CLINIC | Age: 69
End: 2024-07-18
Payer: MEDICARE

## 2024-07-18 NOTE — PROGRESS NOTES
2024  Foot and Ankle Surgery - New Patient   Provider: Dr. Selvin Kwong DPM  Location: [facility]    Subjective:  Marilyn Pineda is a 68 y.o. female.     Chief Complaint   Patient presents with    Right Foot - Initial Evaluation, Pain     PAIN 1     Follow-up     Last saw ROSALVA Brown MD -PCP 2024       History of Present Illness  The patient presents for evaluation of right foot pain. She is accompanied by an adult male.    She was referred by Dr. Brown due to foot issues. Following a bout of influenza, she experienced sciatic nerve pain due to inactivity. She was prescribed a steroid pack for suspected lymphedema, which led to increased swelling in her right leg. She also underwent physical therapy at [facility], which led to a reduction in the swelling. However, she continues to experience swelling in her ankles. She suspects neuropathic pain in her foot, accompanied by occasional tingling. She denies any knee or hip issues. However, she recalls her hip being dislocated during a hysterectomy, for which she sought chiropractic treatment. She denies any back issues. She also reports a drop in her arch and a severe bunion on her right foot. She avoids walking barefoot during the summer months. She discontinued physical therapy three weeks ago due to her pain.   Her father is 96 years old and is in the hospital right now.       Allergies   Allergen Reactions    Augmentin [Amoxicillin-Pot Clavulanate] Hives       Past Medical History:   Diagnosis Date    Allergic     amoxicilin clayvix    Anemia     Cancer     cervial cancer/ radiation and had hysterectomy    Pain of right hip joint 2018    Right lower lobe pneumonia 2018    Thrush, oral 2018       Past Surgical History:   Procedure Laterality Date     SECTION  1984    HAND SURGERY      HYSTERECTOMY         Family History   Problem Relation Age of Onset    COPD Mother         from smoking for years     "Miscarriages / Stillbirths Mother         had a miscarriage    Thyroid disease Father         is taking thyroid pill    Cancer Maternal Grandmother         had lung cancer       Social History     Socioeconomic History    Marital status:    Tobacco Use    Smoking status: Never    Smokeless tobacco: Never   Vaping Use    Vaping status: Never Used   Substance and Sexual Activity    Alcohol use: Not Currently     Comment: may drink a little wine but only very occasionally    Drug use: No    Sexual activity: Not Currently     Partners: Male        Current Outpatient Medications on File Prior to Visit   Medication Sig Dispense Refill    BIOTIN PO Take 1 tablet by mouth Daily.      cyanocobalamin (VITAMIN B-12) 2500 MCG tablet tablet Take 500 mcg by mouth Daily.      Ferrous Sulfate (IRON PO) Take 65 mg by mouth Daily.      Misc Natural Products (BEET ROOT PO) Take 500 mg by mouth Daily.      Multiple Minerals-Vitamins (Calcium-Magnesium-Zinc-D3) tablet Take 1 tablet by mouth Daily.      multivitamin with minerals (MULTIVITAMIN WOMEN PO) Take 1 tablet by mouth Daily.      vitamin B-6 (PYRIDOXINE) 50 MG tablet Take 2 tablets by mouth Daily.      DULoxetine (CYMBALTA) 30 MG capsule Take 1 capsule by mouth Daily. Indications: Generalized Anxiety Disorder, Major Depressive Disorder, Musculoskeletal Pain, Peripheral Nerve Disease (Patient not taking: Reported on 7/17/2024) 30 capsule 3     No current facility-administered medications on file prior to visit.         Objective   Pulse 83   Ht 165.1 cm (65\")   Wt 90.7 kg (200 lb)   BMI 33.28 kg/m²     Foot/Ankle Exam    GENERAL  Appearance:  appears stated age  Orientation:  AAOx3  Affect:  appropriate    VASCULAR     Right Foot Vascularity   Dorsalis pedis:  2+  Posterior tibial:  2+  Skin temperature:  warm  Edema grading:  Non-pitting and 3+  CFT:  < 3 seconds  Pedal hair growth:  Present     NEUROLOGIC     Right Foot Neurologic   Light touch sensation: " normal  Hot/Cold sensation: normal  Achilles reflex:  1+    MUSCULOSKELETAL     Right Foot Musculoskeletal   Ecchymosis:  none  Tenderness:  dorsal foot, midtarsal joints tenderness and plantar fascia tenderness    Arch:  Pes planus    MUSCLE STRENGTH     Right Foot Muscle Strength   Foot dorsiflexion:  4+  Foot plantar flexion:  4+  Foot inversion:  4+  Foot eversion:  4+    RANGE OF MOTION     Right Foot Range of Motion   Foot and ankle ROM within normal limits      DERMATOLOGIC      Right Foot Dermatologic   Skin  Right foot skin is intact.      Right foot additional comments: Pes planus foot structure with bony rigidity involving the midtarsal joint region.  Significant abduction of the foot with prominent lymphedema involving the right lower extremity.  Range of motion is nontender.  Muscle strength is subtly decreased but functional    Physical Exam         Results  Imaging  Significant arthritis involving the middle of the right foot. Progressive bunion deformity on the right foot.       Assessment & Plan   Diagnoses and all orders for this visit:    1. Right foot pain (Primary)  -     XR Foot 3+ View Right    2. 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 Lymphedema  -     Ambulatory Referral to Physical Therapy for Evaluation & Treatment    4. Sciatica, right side    5. Acquired posterior equinus, right  -     Ambulatory Referral to Physical Therapy for Evaluation & Treatment    6. Acquired pes planus, right       Assessment & Plan    Significant arthritis is evident involving the middle of her foot, accompanied by an increase in progressive bunion deformity on the right foot. Her knees are knocked-kneed, which can create significant changes, causing her feet to compensate for the rest of her body, leading to flattening out with age. Improvement of support and function is recommended, as she does not experience significant pain. The use of arch support in her shoes is recommended, along with  compression stockings. She should avoid walking barefoot, wearing flip flops, sandals, and flats around the house. Physical therapy is recommended to improve muscle function, stretch, and strengthen the right lower leg. Low-impact exercises such as biking, ellipticals, swimming, and water aerobics are recommended to strengthen the muscles. A compression garment is prescribed for the right lower extremity. Should her pain become intolerable, surgical intervention may be considered.Reviewed proper basic stretching and manual therapy exercises along with appropriate shoes and activity.  Discussed proper use and/or avoidance of OTC anti-inflammatories.  Patient is to call with any additional issues or concerns.  Greater than 45 minutes was spent before, during, and after evaluation for patient care.    Follow-up  A follow-up appointment is scheduled for 2 months from now.           Patient or patient representative verbalized consent for the use of Ambient Listening during the visit with  AGNES Kwong DPM for chart documentation. 7/18/2024  07:21 EDT    AGNES Kwong DPM

## 2024-07-22 ENCOUNTER — TELEPHONE (OUTPATIENT)
Dept: PODIATRY | Facility: CLINIC | Age: 69
End: 2024-07-22
Payer: MEDICARE

## 2024-07-22 NOTE — TELEPHONE ENCOUNTER
Provider: CHRIS    Caller: PATIENT    Phone Number: [phone]    Reason for Call: PATIENT STATES SHE WOULD LIKE TO GO TO THE [facility] P/T, SHE STATES THAT IS MUCH CLOSER FOR HER THAN THE [place] LOCATION. SHE STATES SHE REQUESTED TO GO TO [facility]. PATIENT WOULD LIKE A CALL BACK PLEASE.

## 2024-07-22 NOTE — TELEPHONE ENCOUNTER
I spoke with patient and referral has been switched to [facility]

## 2024-07-24 ENCOUNTER — TELEPHONE (OUTPATIENT)
Dept: PODIATRY | Facility: CLINIC | Age: 69
End: 2024-07-24
Payer: MEDICARE

## 2024-07-24 DIAGNOSIS — M19.071 ARTHRITIS OF MIDTARSAL JOINT OF RIGHT FOOT: ICD-10-CM

## 2024-07-24 DIAGNOSIS — M79.89 SWELLING OF LOWER EXTREMITY: ICD-10-CM

## 2024-07-24 DIAGNOSIS — M79.671 RIGHT FOOT PAIN: Primary | ICD-10-CM

## 2024-07-24 DIAGNOSIS — M21.41 ACQUIRED PES PLANUS, RIGHT: ICD-10-CM

## 2024-07-24 DIAGNOSIS — M21.861 ACQUIRED POSTERIOR EQUINUS, RIGHT: ICD-10-CM

## 2024-07-24 NOTE — TELEPHONE ENCOUNTER
Caller: Marilyn Pineda    Relationship to patient: Self    Best call back number: [phone] (home)     Patient is needing: PATIENT WAS REFERRED TO PT FROM DR PEREZ.   THE PT REFERRAL STATES SHE NEEDS PT FOR LYMPHEDEMA BUT SHE WAS TOLD THAT IT WAS TO STRENGTHEN HER FOOT/HEEL.   DOES REFERRAL NEED TO BE UPDATED.   PT HAS ALSO ROUTED IT BACK STATING THEY DON'T TREAT THAT DX   PLEASE ADVISE

## 2024-07-31 NOTE — TELEPHONE ENCOUNTER
I called and only [facility] facility that does lower body lymphedema is [facility].   I called patient to let her know this and she states the P.T order was not suppose to be for lymphedema but only foot and arch strengthening. Can you please clarify? She states she will only got to [facility] maybe 1 time if that's the case.

## 2024-08-12 ENCOUNTER — TREATMENT (OUTPATIENT)
Dept: PHYSICAL THERAPY | Facility: CLINIC | Age: 69
End: 2024-08-12
Payer: MEDICARE

## 2024-08-12 DIAGNOSIS — M19.071 ARTHRITIS OF MIDTARSAL JOINT OF RIGHT FOOT: ICD-10-CM

## 2024-08-12 DIAGNOSIS — M21.41 ACQUIRED PES PLANUS, RIGHT: ICD-10-CM

## 2024-08-12 DIAGNOSIS — M79.671 RIGHT FOOT PAIN: Primary | ICD-10-CM

## 2024-08-12 DIAGNOSIS — M21.861 ACQUIRED POSTERIOR EQUINUS, RIGHT: ICD-10-CM

## 2024-08-12 DIAGNOSIS — M79.89 SWELLING OF LOWER EXTREMITY: ICD-10-CM

## 2024-08-12 NOTE — PROGRESS NOTES
Physical Therapy Initial Evaluation and Plan of Care    Patient: Marilyn Pineda   : 1955  Diagnosis/ICD-10 Code:  Right foot pain [M79.671]  Referring practitioner: AGNES Kwong DPM  Date of Initial Visit: 2024  Today's Date: 2024  Patient seen for 1 sessions         Visit Diagnoses:    ICD-10-CM ICD-9-CM   1. Right foot pain  M79.671 729.5   2. 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 Acquired posterior equinus, right  M21.861 736.72   4. Acquired pes planus, right  M21.41 734   5. Swelling of lower extremity  M79.89 729.81       Subjective Questionnaire: LEFS: 60    Subjective Evaluation    History of Present Illness  Mechanism of injury: 68 year old female with reports of chronic R foot pain and impaired gait. States her arch has collapsed and she is very flat footed. Also reports her foot turns outward when she stands and walks. She reports ankle stiffness. Reports numbness in ball of foot.Received inserts from podiatrist.     Pain  At worst pain ratin  Quality: dull ache  Aggravating factors: stairs, standing, prolonged positioning, ambulation and squatting    Diagnostic Tests  Abnormal x-ray: see scanned.    Treatments  Treatments tried: inserts.  Patient Goals  Patient goals for therapy: increased strength, decreased pain, improved balance, increased motion and independence with ADLs/IADLs             Objective          Observations     Right Ankle/Foot   Positive for edema.     Additional Ankle/Foot Observation Details  Pes planus R>L    Active Range of Motion   Left Ankle/Foot   Normal active range of motion    Right Ankle/Foot   Dorsiflexion (ke): 0 degrees   Plantar flexion: 30 degrees   Inversion: 15 degrees   Eversion: 0 degrees     Joint Play     Right Ankle/Foot  Hypomobile in the talocrural joint and subtalar joint.     Strength/Myotome Testing     Left Ankle/Foot   Normal strength    Right Ankle/Foot   Dorsiflexion: 4-  Plantar flexion:  4-  Inversion: 4-  Eversion: 4-    Additional Strength Details  Within available ROM    Ambulation     Observational Gait     Additional Observational Gait Details  Trendelenburg           Assessment & Plan       Assessment  Impairments: abnormal gait, abnormal or restricted ROM, impaired balance, impaired physical strength, lacks appropriate home exercise program, pain with function and safety issue   Functional limitations: walking, uncomfortable because of pain, standing, stooping and unable to perform repetitive tasks   Assessment details: 68 year old female with reports of chronic R foot pain and impaired gait. States her arch has collapsed and she is very flat footed. Also reports her foot turns outward when she stands and walks. She reports ankle stiffness. Reports numbness in ball of foot.Received inserts from podiatrist.  Pt presents to skilled PT with R foot pain, pes planus, impaired gait, weakness and decreased AROM of R ankle. Pt would benefit from skilled PT to address the above findings and improve pt's ease with functional mobility and return to PLOF.    Barriers to therapy: None  Prognosis: good    Goals  Plan Goals: ST.Pt will report reduction in worst pain level to 2/10 in 2 weeks.  2.Pt will improve AROM of R ankle DF to 5 deg in 2 weeks.  3. Pt will demo good tolerance and compliance with HEP in 2 weeks.    LT.Pt will be independent with HEP in 6 weeks for self management and prevention of re-occurrence.   2.Pt will improve strength of R ankle to 4+/5 throughout in 6 weeks.  3.Pt will improve AROM of R ankle to WFL in 6 weeks.  4. Pt will ambulate with normalized gait pattern in 6 weeks.    Plan  Therapy options: will be seen for skilled therapy services  Planned modality interventions: cryotherapy, electrical stimulation/Russian stimulation, thermotherapy (hydrocollator packs) and ultrasound  Planned therapy interventions: flexibility, functional ROM exercises, gait training, home  exercise program, joint mobilization, neuromuscular re-education, manual therapy, strengthening, stretching and therapeutic activities  Frequency: 2x week  Duration in weeks: 6  Treatment plan discussed with: patient        History # of Personal Factors and/or Comorbidities: MODERATE (1-2)  Examination of Body System(s): # of elements: LOW (1-2)  Clinical Presentation: STABLE   Clinical Decision Making: LOW       Timed:  Manual Therapy:    15     mins  55550;  Therapeutic Exercise:    15     mins  97193;          Untimed:  Low Eval                        15     Mins  42546      Timed Treatment:   30   mins   Total Treatment:     45   mins    PT SIGNATURE: Olivia Gutierrez PT, DPT  PT license: [place] [id number]        DATE TREATMENT INITIATED: 8/12/2024    Medicare Initial Certification  Certification Period: 11/9/2024  I certify that the therapy services are furnished while this patient is under my care.  The services outlined above are required by this patient, and will be reviewed every 90 days.    Physician Signature: __________________________________________________________    PHYSICIAN: AGNES Kwong DPM      DATE:     Please sign and return via fax to [phone].. Thank you, [facility].  [address] [place] [address]

## 2024-08-15 ENCOUNTER — TELEPHONE (OUTPATIENT)
Dept: PODIATRY | Facility: CLINIC | Age: 69
End: 2024-08-15

## 2024-08-15 NOTE — TELEPHONE ENCOUNTER
Caller: Marilyn Pineda    Relationship: Self    Best call back number:[phone] (home)      What form or medical record are you requesting: DISC OF XRAY OF RIGHT FOOT     Who is requesting this form or medical record from you: PATIENT     How would you like to receive the form or medical records (pick-up, mail, fax):      Timeframe paperwork needed: ASAP     Additional notes: NA

## 2024-08-19 ENCOUNTER — TREATMENT (OUTPATIENT)
Dept: PHYSICAL THERAPY | Facility: CLINIC | Age: 69
End: 2024-08-19
Payer: MEDICARE

## 2024-08-19 DIAGNOSIS — M79.89 SWELLING OF LOWER EXTREMITY: ICD-10-CM

## 2024-08-19 DIAGNOSIS — M79.671 RIGHT FOOT PAIN: Primary | ICD-10-CM

## 2024-08-19 DIAGNOSIS — M19.071 ARTHRITIS OF MIDTARSAL JOINT OF RIGHT FOOT: ICD-10-CM

## 2024-08-19 DIAGNOSIS — M21.861 ACQUIRED POSTERIOR EQUINUS, RIGHT: ICD-10-CM

## 2024-08-19 DIAGNOSIS — M21.41 ACQUIRED PES PLANUS, RIGHT: ICD-10-CM

## 2024-08-20 NOTE — PROGRESS NOTES
Physical Therapy Daily Treatment Note  Visit: 2        Patient: Marilyn Pineda   : 1955  Diagnosis/ICD-10 Code:  Right foot pain [M79.671]  Referring practitioner: AGNES Kwong DPM  Date of Initial Visit: Type: THERAPY  Noted: 2024  Today's Date: 2024  Patient seen for 2 sessions       Visit Diagnoses:    ICD-10-CM ICD-9-CM   1. Right foot pain  M79.671 729.5   2. 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 Acquired posterior equinus, right  M21.861 736.72   4. Acquired pes planus, right  M21.41 734   5. Swelling of lower extremity  M79.89 729.81       Subjective     Marilyn Pineda reports: no new complaints today.    Objective   See Exercise, Manual, and Modality Logs for complete treatment.       Assessment/Plan  Progressing balance training and stretching for ROM. Pt demo improved AROM post session in R ankle. No reports of pain throughout session.    Progress per Plan of Care.         Timed:  Manual Therapy:    8     mins  63186;  Therapeutic Exercise:    15     mins  75920;     Neuromuscular Karina:    15    mins  49918;        Timed Treatment:   38   mins   Total Treatment:     38   mins        Olivia Gutierrez PT, DPT  Physical Therapist  PT license: [place] [id number]

## 2024-08-22 ENCOUNTER — TREATMENT (OUTPATIENT)
Dept: PHYSICAL THERAPY | Facility: CLINIC | Age: 69
End: 2024-08-22
Payer: MEDICARE

## 2024-08-22 DIAGNOSIS — M79.89 SWELLING OF LOWER EXTREMITY: ICD-10-CM

## 2024-08-22 DIAGNOSIS — M19.071 ARTHRITIS OF MIDTARSAL JOINT OF RIGHT FOOT: ICD-10-CM

## 2024-08-22 DIAGNOSIS — M79.671 RIGHT FOOT PAIN: Primary | ICD-10-CM

## 2024-08-22 DIAGNOSIS — M21.861 ACQUIRED POSTERIOR EQUINUS, RIGHT: ICD-10-CM

## 2024-08-22 DIAGNOSIS — M21.41 ACQUIRED PES PLANUS, RIGHT: ICD-10-CM

## 2024-08-22 NOTE — PROGRESS NOTES
Physical Therapy Daily Treatment Note  Visit: 3        Patient: Marilyn Pineda   : 1955  Diagnosis/ICD-10 Code:  Right foot pain [M79.671]  Referring practitioner: AGNES Kwong DPM  Date of Initial Visit: Type: THERAPY  Noted: 2024  Today's Date: 2024  Patient seen for 3 sessions       Visit Diagnoses:    ICD-10-CM ICD-9-CM   1. Right foot pain  M79.671 729.5   2. 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 Acquired posterior equinus, right  M21.861 736.72   4. Acquired pes planus, right  M21.41 734   5. Swelling of lower extremity  M79.89 729.81       Subjective     Marilyn Pineda reports: no new complaints.    Objective   See Exercise, Manual, and Modality Logs for complete treatment.       Assessment/Plan  Added hip strengthening today to improve balance and stability during functional tasks. Progressing per pt tolerance.     Progress per Plan of Care.         Timed:  Manual Therapy:    8     mins  97954;  Therapeutic Exercise:    15     mins  61600;     Neuromuscular Karina:    15    mins  61197;        Timed Treatment:   38   mins   Total Treatment:     38   mins        Olivia Gutierrez PT, DPT  Physical Therapist  PT license: [place] [id number]

## 2024-08-26 ENCOUNTER — TREATMENT (OUTPATIENT)
Dept: PHYSICAL THERAPY | Facility: CLINIC | Age: 69
End: 2024-08-26
Payer: MEDICARE

## 2024-08-26 DIAGNOSIS — M79.671 RIGHT FOOT PAIN: Primary | ICD-10-CM

## 2024-08-26 DIAGNOSIS — M19.071 ARTHRITIS OF MIDTARSAL JOINT OF RIGHT FOOT: ICD-10-CM

## 2024-08-26 DIAGNOSIS — M79.89 SWELLING OF LOWER EXTREMITY: ICD-10-CM

## 2024-08-26 DIAGNOSIS — M21.861 ACQUIRED POSTERIOR EQUINUS, RIGHT: ICD-10-CM

## 2024-08-26 DIAGNOSIS — M21.41 ACQUIRED PES PLANUS, RIGHT: ICD-10-CM

## 2024-08-27 NOTE — PROGRESS NOTES
Physical Therapy Daily Treatment Note  Visit: 4        Patient: Marilyn Pineda   : 1955  Diagnosis/ICD-10 Code:  Right foot pain [M79.671]  Referring practitioner: AGNES Kwong DPM  Date of Initial Visit: Type: THERAPY  Noted: 2024  Today's Date: 2024  Patient seen for 4 sessions       Visit Diagnoses:    ICD-10-CM ICD-9-CM   1. Right foot pain  M79.671 729.5   2. 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 Acquired posterior equinus, right  M21.861 736.72   4. Acquired pes planus, right  M21.41 734   5. Swelling of lower extremity  M79.89 729.81       Subjective     Marilyn Pineda reports: no new complaints.    Objective   See Exercise, Manual, and Modality Logs for complete treatment.       Assessment/Plan  Progressed balance and LE strengthening exercises today for improved stability with functional tasks and decreased joint stress. Pt with good tolerance and carry over noted.    Progress per Plan of Care.         Timed:  Manual Therapy:    8     mins  89201;  Therapeutic Exercise:    15     mins  50457;     Neuromuscular Karina:    15    mins  40631;        Timed Treatment:   38   mins   Total Treatment:     38   mins        Olivia Gutierrez PT, DPT  Physical Therapist  PT license: [place] [id number]

## 2024-08-29 ENCOUNTER — TREATMENT (OUTPATIENT)
Dept: PHYSICAL THERAPY | Facility: CLINIC | Age: 69
End: 2024-08-29
Payer: MEDICARE

## 2024-08-29 DIAGNOSIS — M21.861 ACQUIRED POSTERIOR EQUINUS, RIGHT: ICD-10-CM

## 2024-08-29 DIAGNOSIS — M19.071 ARTHRITIS OF MIDTARSAL JOINT OF RIGHT FOOT: ICD-10-CM

## 2024-08-29 DIAGNOSIS — M21.41 ACQUIRED PES PLANUS, RIGHT: ICD-10-CM

## 2024-08-29 DIAGNOSIS — M79.89 SWELLING OF LOWER EXTREMITY: ICD-10-CM

## 2024-08-29 DIAGNOSIS — M79.671 RIGHT FOOT PAIN: Primary | ICD-10-CM

## 2024-08-29 NOTE — PROGRESS NOTES
Physical Therapy Daily Treatment Note  Visit: 5        Patient: Marilyn Pineda   : 1955  Diagnosis/ICD-10 Code:  Right foot pain [M79.671]  Referring practitioner: AGNES Kwong DPM  Date of Initial Visit: Type: THERAPY  Noted: 2024  Today's Date: 2024  Patient seen for 5 sessions       Visit Diagnoses:    ICD-10-CM ICD-9-CM   1. Right foot pain  M79.671 729.5   2. 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 Acquired posterior equinus, right  M21.861 736.72   4. Acquired pes planus, right  M21.41 734   5. Swelling of lower extremity  M79.89 729.81       Subjective     Marilyn Pineda reports: no new complaints8.    Objective   See Exercise, Manual, and Modality Logs for complete treatment.       Assessment/Plan  Progressed LE strengthening and balance training with good tolerance. Demo good prorgess towards all goals. Plan to wean to 1x per week for the next 2-3 weeks then reevaluate for d/c planning.    Progress per Plan of Care.         Timed:  Manual Therapy:    8     mins  62105;  Therapeutic Exercise:    15     mins  96205;     Neuromuscular Karina:    15    mins  44618;        Timed Treatment:   38   mins   Total Treatment:     38   mins        Olivia Gutierrez PT, SHARDA  Physical Therapist  PT license: [place] [id number]

## 2024-09-03 ENCOUNTER — TREATMENT (OUTPATIENT)
Dept: PHYSICAL THERAPY | Facility: CLINIC | Age: 69
End: 2024-09-03
Payer: MEDICARE

## 2024-09-03 DIAGNOSIS — M79.671 RIGHT FOOT PAIN: Primary | ICD-10-CM

## 2024-09-03 DIAGNOSIS — M21.861 ACQUIRED POSTERIOR EQUINUS, RIGHT: ICD-10-CM

## 2024-09-03 DIAGNOSIS — M79.89 SWELLING OF LOWER EXTREMITY: ICD-10-CM

## 2024-09-03 DIAGNOSIS — M21.41 ACQUIRED PES PLANUS, RIGHT: ICD-10-CM

## 2024-09-03 DIAGNOSIS — M19.071 ARTHRITIS OF MIDTARSAL JOINT OF RIGHT FOOT: ICD-10-CM

## 2024-09-03 NOTE — PROGRESS NOTES
Physical Therapy Daily Progress Note      Patient: Marilyn Pineda   : 1955  Diagnosis/ICD-10 Code:  Right foot pain [M79.671]  Referring practitioner: AGNES Kwong DPM  Date of Initial Visit: Type: THERAPY  Noted: 2024  Today's Date: 9/3/2024  Patient seen for 6 sessions             Subjective Pt is still having difficulty being able to turn her right foot inward but is doing her HEP.    Objective   See Exercise, Manual, and Modality Logs for complete treatment.       Assessment/Plan  Pt seemed to respond well overall to rx today.    Progress per Plan of Care           Timed:         Manual Therapy:    15     mins  41878;     Therapeutic Exercise:    15     mins  34596;         Timed Treatment:   30   mins   Total Treatment:     30   mins        Donaldo Dejesus PTA  Physical Therapist Assistant

## 2024-09-10 ENCOUNTER — TREATMENT (OUTPATIENT)
Dept: PHYSICAL THERAPY | Facility: CLINIC | Age: 69
End: 2024-09-10
Payer: MEDICARE

## 2024-09-10 DIAGNOSIS — M21.41 ACQUIRED PES PLANUS, RIGHT: ICD-10-CM

## 2024-09-10 DIAGNOSIS — M79.89 SWELLING OF LOWER EXTREMITY: ICD-10-CM

## 2024-09-10 DIAGNOSIS — M21.861 ACQUIRED POSTERIOR EQUINUS, RIGHT: ICD-10-CM

## 2024-09-10 DIAGNOSIS — M79.671 RIGHT FOOT PAIN: Primary | ICD-10-CM

## 2024-09-10 DIAGNOSIS — M19.071 ARTHRITIS OF MIDTARSAL JOINT OF RIGHT FOOT: ICD-10-CM

## 2024-09-10 NOTE — PROGRESS NOTES
Physical Therapy Daily Treatment Note  Visit: 7        Patient: Marilyn Pineda   : 1955  Diagnosis/ICD-10 Code:  Right foot pain [M79.671]  Referring practitioner: AGNES Kwong DPM  Date of Initial Visit: Type: THERAPY  Noted: 2024  Today's Date: 9/10/2024  Patient seen for 7 sessions       Visit Diagnoses:    ICD-10-CM ICD-9-CM   1. Right foot pain  M79.671 729.5   2. 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 Acquired posterior equinus, right  M21.861 736.72   4. Acquired pes planus, right  M21.41 734   5. Swelling of lower extremity  M79.89 729.81       Subjective     Marilyn Pineda reports: much improved R foot and ankle pain. Still having trouble inverting ankle, but feels it is improving.     Objective   See Exercise, Manual, and Modality Logs for complete treatment.       Assessment/Plan  Planning trial of HEP over the next 2 weeks. Pt to call if problems arise and she needs to return sooner. She demo good understanding of current HEP.     Progress per Plan of Care.         Timed:  Manual Therapy:    8     mins  95614;  Therapeutic Exercise:    15     mins  46259;     Neuromuscular Karina:    15    mins  36284;      Timed Treatment:   38   mins   Total Treatment:     38   mins        Olivia Gutierrez PT, DPT  Physical Therapist  PT license: [place] [id number]

## 2024-10-01 ENCOUNTER — LAB (OUTPATIENT)
Dept: FAMILY MEDICINE CLINIC | Facility: CLINIC | Age: 69
End: 2024-10-01
Payer: MEDICARE

## 2024-10-01 ENCOUNTER — OFFICE VISIT (OUTPATIENT)
Dept: FAMILY MEDICINE CLINIC | Facility: CLINIC | Age: 69
End: 2024-10-01
Payer: MEDICARE

## 2024-10-01 VITALS
HEIGHT: 65 IN | RESPIRATION RATE: 16 BRPM | DIASTOLIC BLOOD PRESSURE: 98 MMHG | BODY MASS INDEX: 32.53 KG/M2 | WEIGHT: 195.25 LBS | HEART RATE: 86 BPM | OXYGEN SATURATION: 99 % | SYSTOLIC BLOOD PRESSURE: 140 MMHG

## 2024-10-01 DIAGNOSIS — I89.0 LYMPHEDEMA OF EXTREMITY: ICD-10-CM

## 2024-10-01 DIAGNOSIS — Z00.00 MEDICARE ANNUAL WELLNESS VISIT, SUBSEQUENT: ICD-10-CM

## 2024-10-01 DIAGNOSIS — Z00.00 MEDICARE ANNUAL WELLNESS VISIT, SUBSEQUENT: Primary | ICD-10-CM

## 2024-10-01 DIAGNOSIS — M21.961 ACQUIRED DEFORMITY OF RIGHT FOOT: ICD-10-CM

## 2024-10-01 DIAGNOSIS — D50.9 IRON DEFICIENCY ANEMIA, UNSPECIFIED IRON DEFICIENCY ANEMIA TYPE: ICD-10-CM

## 2024-10-01 PROCEDURE — 80053 COMPREHEN METABOLIC PANEL: CPT | Performed by: STUDENT IN AN ORGANIZED HEALTH CARE EDUCATION/TRAINING PROGRAM

## 2024-10-01 PROCEDURE — 84466 ASSAY OF TRANSFERRIN: CPT | Performed by: STUDENT IN AN ORGANIZED HEALTH CARE EDUCATION/TRAINING PROGRAM

## 2024-10-01 PROCEDURE — 36415 COLL VENOUS BLD VENIPUNCTURE: CPT

## 2024-10-01 PROCEDURE — 85025 COMPLETE CBC W/AUTO DIFF WBC: CPT | Performed by: STUDENT IN AN ORGANIZED HEALTH CARE EDUCATION/TRAINING PROGRAM

## 2024-10-01 PROCEDURE — 82728 ASSAY OF FERRITIN: CPT | Performed by: STUDENT IN AN ORGANIZED HEALTH CARE EDUCATION/TRAINING PROGRAM

## 2024-10-01 PROCEDURE — 83540 ASSAY OF IRON: CPT | Performed by: STUDENT IN AN ORGANIZED HEALTH CARE EDUCATION/TRAINING PROGRAM

## 2024-10-01 NOTE — PATIENT INSTRUCTIONS
Advance Care Planning and Advance Directives     You make decisions on a daily basis - decisions about where you want to live, your career, your home, your life. Perhaps one of the most important decisions you face is your choice for future medical care. Take time to talk with your family and your healthcare team and start planning today.  Advance Care Planning is a process that can help you:  Understand possible future healthcare decisions in light of your own experiences  Reflect on those decision in light of your goals and values  Discuss your decisions with those closest to you and the healthcare professionals that care for you  Make a plan by creating a document that reflects your wishes    Surrogate Decision Maker  In the event of a medical emergency, which has left you unable to communicate or to make your own decisions, you would need someone to make decisions for you.  It is important to discuss your preferences for medical treatment with this person while you are in good health.     Qualities of a surrogate decision maker:  Willing to take on this role and responsibility  Knows what you want for future medical care  Willing to follow your wishes even if they don't agree with them  Able to make difficult medical decisions under stressful circumstances    Advance Directives  These are legal documents you can create that will guide your healthcare team and decision maker(s) when needed. These documents can be stored in the electronic medical record.    Living Will - a legal document to guide your care if you have a terminal condition or a serious illness and are unable to communicate. States vary by statute in document names/types, but most forms may include one or more of the following:        -  Directions regarding life-prolonging treatments        -  Directions regarding artificially provided nutrition/hydration        -  Choosing a healthcare decision maker        -  Direction regarding organ/tissue  donation    Durable Power of  for Healthcare - this document names an -in-fact to make medical decisions for you, but it may also allow this person to make personal and financial decisions for you. Please seek the advice of an  if you need this type of document.    **Advance Directives are not required and no one may discriminate against you if you do not sign one.    Medical Orders  Many states allow specific forms/orders signed by your physician to record your wishes for medical treatment in your current state of health. This form, signed in personal communication with your physician, addresses resuscitation and other medical interventions that you may or may not want.      For more information or to schedule a time with a [facility] Advance Care Planning Facilitator contact: [employer] or call [phone] and someone will contact you directly.    Medicare Wellness  Personal Prevention Plan of Service     Date of Office Visit:    Encounter Provider:  Cinthia Brown MD  Place of Service:  [facility]  Patient Name: Marilyn Pineda  :  1955    As part of the Medicare Wellness portion of your visit today, we are providing you with this personalized preventive plan of services (PPPS). This plan is based upon recommendations of the [place] Preventive Services Task Force (USPSTF) and the Advisory Committee on Immunization Practices (ACIP).    This lists the preventive care services that should be considered, and provides dates of when you are due. Items listed as completed are up-to-date and do not require any further intervention.    Health Maintenance   Topic Date Due   • MAMMOGRAM  Never done   • DXA SCAN  Never done   • ZOSTER VACCINE (1 of 2) Never done   • ANNUAL WELLNESS VISIT  Never done   • PAP SMEAR  Never done   • Pneumococcal Vaccine 65+ (1 of 1 - PCV) Never done   • TDAP/TD VACCINES (2 - Td or Tdap) 2024   •  COLORECTAL CANCER SCREENING  06/12/2024   • COVID-19 Vaccine (1 - 2023-24 season) Never done   • INFLUENZA VACCINE  03/31/2025 (Originally 8/1/2024)   • BMI FOLLOWUP  08/01/2025   • HEPATITIS C SCREENING  Completed       Orders Placed This Encounter   Procedures   • CBC Auto Differential     Standing Status:   Future     Standing Expiration Date:   10/1/2025     Order Specific Question:   Release to patient     Answer:   Routine Release [[id number]]   • Comprehensive Metabolic Panel     Standing Status:   Future     Order Specific Question:   Release to patient     Answer:   Routine Release [[id number]]   • Iron and TIBC     Standing Status:   Future     Standing Expiration Date:   10/1/2025     Order Specific Question:   Release to patient     Answer:   Routine Release [[id number]]   • Ferritin     Standing Status:   Future     Standing Expiration Date:   10/1/2025     Order Specific Question:   Release to patient     Answer:   Routine Release [[id number]]       No follow-ups on file.

## 2024-10-01 NOTE — PROGRESS NOTES
Subjective   The ABCs of the Annual Wellness Visit  Medicare Wellness Visit      Marilyn Pineda is a 68 y.o. patient who presents for a Medicare Wellness Visit.    The following portions of the patient's history were reviewed and   updated as appropriate: allergies, current medications, past family history, past medical history, past social history, past surgical history, and problem list.    Compared to one year ago, the patient's physical   health is the same.  Compared to one year ago, the patient's mental   health is the same.    Recent Hospitalizations:  She was not admitted to the hospital during the last year.     Current Medical Providers:  Patient Care Team:  Cinthia Brown MD as PCP - General (Internal Medicine & Pediatrics)  Patrick Leblanc APRN (Obstetrics and Gynecology)    Outpatient Medications Prior to Visit   Medication Sig Dispense Refill    BIOTIN PO Take 1 tablet by mouth Daily.      cyanocobalamin (VITAMIN B-12) 2500 MCG tablet tablet Take 500 mcg by mouth Daily.      Ferrous Sulfate (IRON PO) Take 65 mg by mouth Daily.      Misc Natural Products (BEET ROOT PO) Take 500 mg by mouth Daily.      Multiple Minerals-Vitamins (Calcium-Magnesium-Zinc-D3) tablet Take 1 tablet by mouth Daily.      multivitamin with minerals (MULTIVITAMIN WOMEN PO) Take 1 tablet by mouth Daily.      vitamin B-6 (PYRIDOXINE) 50 MG tablet Take 2 tablets by mouth Daily.      DULoxetine (CYMBALTA) 30 MG capsule Take 1 capsule by mouth Daily. Indications: Generalized Anxiety Disorder, Major Depressive Disorder, Musculoskeletal Pain, Peripheral Nerve Disease (Patient not taking: Reported on 7/17/2024) 30 capsule 3     No facility-administered medications prior to visit.     No opioid medication identified on active medication list. I have reviewed chart for other potential  high risk medication/s and harmful drug interactions in the elderly.      Aspirin is not on active medication list.  Aspirin use is not indicated  "based on review of current medical condition/s. Risk of harm outweighs potential benefits.  .    Patient Active Problem List   Diagnosis    Peripheral neuropathy    Swelling of lower extremity    Anxiety and depression    SBO (small bowel obstruction)    Small bowel obstruction    Positive colorectal cancer screening using Cologuard test     Advance Care Planning Advance Directive is not on file.  ACP discussion was held with the patient during this visit. Patient does not have an advance directive, information provided.            Objective   Vitals:    10/01/24 1152   BP: 140/98   Pulse: 86   Resp: 16   SpO2: 99%   Weight: 88.6 kg (195 lb 4 oz)   Height: 165.1 cm (65\")   PainSc: 0-No pain       Estimated body mass index is 32.49 kg/m² as calculated from the following:    Height as of this encounter: 165.1 cm (65\").    Weight as of this encounter: 88.6 kg (195 lb 4 oz).            Does the patient have evidence of cognitive impairment? No                                                                                                Health  Risk Assessment    Smoking Status:  Social History     Tobacco Use   Smoking Status Never   Smokeless Tobacco Never     Alcohol Consumption:  Social History     Substance and Sexual Activity   Alcohol Use Not Currently    Comment: may drink a little wine but only very occasionally       Fall Risk Screen  STEADI Fall Risk Assessment was completed, and patient is at LOW risk for falls.Assessment completed on:10/1/2024    Depression Screening:      10/1/2024    11:54 AM   PHQ-2/PHQ-9 Depression Screening   Retired Little Interest or Pleasure in Doing Things 0-->not at all   Retired Feeling Down, Depressed or Hopeless 0-->not at all   Retired PHQ-9: Brief Depression Severity Measure Score 0     Health Habits and Functional and Cognitive Screenin/30/2024     9:19 PM   Functional & Cognitive Status   Do you have difficulty preparing food and eating? No    Do you have " difficulty bathing yourself, getting dressed or grooming yourself? No    Do you have difficulty using the toilet? No    Do you have difficulty moving around from place to place? No    Do you have trouble with steps or getting out of a bed or a chair? No    Current Diet Other    Dental Exam Up to date    Eye Exam Not up to date    Exercise (times per week) 2 times per week    Current Exercises Include Home Exercise Program (TV, Computer, Etc.);House Cleaning;Stationary Bicycling/Spin Class;Walking    Do you need help using the phone?  No    Are you deaf or do you have serious difficulty hearing?  No    Do you need help to go to places out of walking distance? No    Do you need help shopping? No    Do you need help preparing meals?  No    Do you need help with housework?  No    Do you need help with laundry? No    Do you need help taking your medications? No    Do you need help managing money? No    Do you ever drive or ride in a car without wearing a seat belt? No    Have you felt unusual stress, anger or loneliness in the last month? No    Who do you live with? Spouse    If you need help, do you have trouble finding someone available to you? No    Have you been bothered in the last four weeks by sexual problems? No    Do you have difficulty concentrating, remembering or making decisions? No        Patient-reported           Age-appropriate Screening Schedule:  Refer to the list below for future screening recommendations based on patient's age, sex and/or medical conditions. Orders for these recommended tests are listed in the plan section. The patient has been provided with a written plan.    Health Maintenance List  Health Maintenance   Topic Date Due    MAMMOGRAM  Never done    DXA SCAN  Never done    ZOSTER VACCINE (1 of 2) Never done    ANNUAL WELLNESS VISIT  Never done    PAP SMEAR  Never done    Pneumococcal Vaccine 65+ (1 of 1 - PCV) Never done    TDAP/TD VACCINES (2 - Td or Tdap) 06/01/2024    COVID-19  Vaccine (1 - 2023-24 season) Never done    INFLUENZA VACCINE  03/31/2025 (Originally 8/1/2024)    BMI FOLLOWUP  08/01/2025    COLORECTAL CANCER SCREENING  10/18/2034    HEPATITIS C SCREENING  Completed                                                                                                                                                CMS Preventative Services Quick Reference  Risk Factors Identified During Encounter  Immunizations Discussed/Encouraged: Prevnar 20 (Pneumococcal 20-valent conjugate)    The above risks/problems have been discussed with the patient.  Pertinent information has been shared with the patient in the After Visit Summary.  An After Visit Summary and PPPS were made available to the patient.    Follow Up:   Next Medicare Wellness visit to be scheduled in 1 year.         Additional E&M Note during same encounter follows:  Patient has additional, significant, and separately identifiable condition(s)/problem(s) that require work above and beyond the Medicare Wellness Visit     Chief Complaint  Medicare Wellness-subsequent    Subjective   HPI  Marilyn is also being seen today for an annual adult preventative physical exam.           Iron Deficiency Anemia  Marilyn has a history of iron deficiency anemia requiring iron transfusion in the past. She has been taking oral iron supplementation every other day with orange juice and reports feeling much better. She also notes that her hair is growing back, which she attributes to the iron supplementation.    Lymphedema  Marilyn has right lower extremity lymphedema, likely secondary to prior cancer and radiation, which has been stable. She went to physical therapy for her lymphedema and reports that it is getting better, but admits she has not been wearing her compression socks as much in the past week. She notes some popping in her ankle with movement.    The patient went to another podiatrist for a second opinion due to concerns about the first  "podiatrist she saw mainly focusing on lymphedema rather than her foot deformity and strengthening, which was the primary reason for the referral. The new podiatrist provided inserts to wear while walking and discussed potential surgical options. Marilyn is trying the boot first before considering surgery.  - The podiatrist provided exercises to strengthen her foot, which Marilyn reports have helped somewhat, though she still has some limitation in movement.    Colonoscopy  Marilyn had a positive Cologuard test and has a colonoscopy scheduled for 10/17/2024. She reports it took some time to get scheduled due to the office being behind.    Preventative/HCM:  - Marilyn has been trying to eat more nuts and protein shakes for iron intake. She is considering switching to Ensure drinks instead of Premier Protein drinks.  - Marilyn joined the [employer] but has only gone once or twice so far. She recognizes the need to exercise more regularly.  - Marilyn is s/p radical total hysterectomy for cervical cancer in 1993; gets her mammograms and pelvic exams done at [facility]. She had a Pap smear done within the past 6 months and a mammogram done this year.  - She also recently had a bone density scan done on 09/13/2024 at [facility] but has not received the results yet.  - Marilyn has an advanced directive in place.  - Immunization status: Marilyn is unsure about getting the pneumonia vaccine. She has never gotten the flu shot.        Objective   Vital Signs:  /98   Pulse 86   Resp 16   Ht 165.1 cm (65\")   Wt 88.6 kg (195 lb 4 oz)   SpO2 99%   BMI 32.49 kg/m²   Physical Exam  Vitals reviewed.   Constitutional:       General: She is not in acute distress.     Appearance: Normal appearance.   HENT:      Head: Normocephalic and atraumatic.      Right Ear: Tympanic membrane, ear canal and external ear normal.      Left Ear: Tympanic membrane, ear canal and external ear normal.      Nose: Nose normal. No congestion.      Mouth/Throat: "      Mouth: Mucous membranes are moist.      Pharynx: Oropharynx is clear. No oropharyngeal exudate or posterior oropharyngeal erythema.   Eyes:      General: No scleral icterus.     Extraocular Movements: Extraocular movements intact.      Conjunctiva/sclera: Conjunctivae normal.      Pupils: Pupils are equal, round, and reactive to light.   Neck:      Thyroid: No thyromegaly or thyroid tenderness.      Trachea: Trachea normal.   Cardiovascular:      Rate and Rhythm: Normal rate and regular rhythm.      Pulses: Normal pulses.      Heart sounds: Normal heart sounds, S1 normal and S2 normal. No murmur heard.  Pulmonary:      Effort: Pulmonary effort is normal. No respiratory distress.      Breath sounds: Normal breath sounds and air entry. No wheezing, rhonchi or rales.   Abdominal:      General: Bowel sounds are normal. There is no distension.      Palpations: Abdomen is soft.      Tenderness: There is no abdominal tenderness. There is no guarding.   Musculoskeletal:         General: No deformity or signs of injury. Normal range of motion.      Cervical back: Normal range of motion and neck supple. No tenderness.      Right lower leg: No edema.      Left lower leg: No edema.   Lymphadenopathy:      Cervical: No cervical adenopathy.   Skin:     General: Skin is warm and dry.      Findings: No rash.   Neurological:      General: No focal deficit present.      Mental Status: She is alert and oriented to person, place, and time.      Motor: No weakness.      Gait: Gait normal.   Psychiatric:         Mood and Affect: Mood normal.         Behavior: Behavior normal.                 Assessment and Plan Additional age appropriate preventative wellness advice topics were discussed during today's preventative wellness exam(some topics already addressed during AWV portion of the note above):    Physical Activity: Advised cardiovascular activity 150 minutes per week as tolerated. (example brisk walk for 30 minutes, 5 days a  week).     Nutrition: Discussed nutrition plan with patient. Information shared in after visit summary. Goal is for a well balanced diet to enhance overall health.              Medicare annual wellness visit, subsequent  Cancer and preventative screenings were reviewed. She follows with [facility] for pelvic exams and mammograms, both of which are UTD. She had DEXA on 9/13/24 and results are pending. Colonoscopy scheduled for 10/17/24. Immunizations were reviewed and education/instructions were provided on any recommended vaccines that are currenlty due. Discussed pneumonia vaccine, but patient is undecided at this time and will consider. Declined flu vaccine. Routine labs ordered today for monitoring and screening as below (patient already completed some annual preventative screening labs in August 2024). Counseled patient on diet, exercise, weight, vaccines, disease/screening prevention, safety, risk reduction, dental/vision care, and mental health. Addressed all patient/family questions.     Iron deficiency anemia, unspecified iron deficiency anemia type  Chronic, control uncertain pending results of today's evaluation  Marilyn has a history of iron deficiency anemia requiring prior transfusions. She has been taking oral iron supplementation every other day with orange juice and reports significant improvement in symptoms. Her hair loss has improved as well, which is likely related to the iron supplementation.  PLAN:  - Check CBC and iron studies today to assess response to oral iron therapy and ensure no further iron transfusions are needed.  - Continue oral iron supplementation every other day with orange juice. Discussed there is no difference in daily versus every other day dosing and qod dosing minimizes side effects      Lymphedema of extremity  Chronic, stable  Marilyn has right lower extremity lymphedema secondary to prior radiation therapy. It has been stable with compression socks and physical therapy.  She recently saw a podiatrist for a second opinion who provided a walking boot and discussed potential surgical options. Marilyn is trying the boot first before considering surgery. She reports some improvement with physical therapy exercises but still has some limitation in movement.  PLAN:  - Continue physical therapy exercises for lymphedema and foot strengthening. Encourage use of compression socks daily.   Acquired deformity of right foot  Chronic, stable  Recently saw a podiatrist for a second opinion who provided inserts and discussed potential surgical options. Marilyn is trying conservative measures before considering surgery. She reports some improvement with inserts and physical therapy exercises but still has some limitation in movement.  PLAN:  - Continue foot strengthening exercises and inserts as recommended by podiatrist    Orders Placed This Encounter   Procedures    CBC Auto Differential     Standing Status:   Future     Number of Occurrences:   1     Standing Expiration Date:   10/1/2025     Order Specific Question:   Release to patient     Answer:   Routine Release [[id number]]    Comprehensive Metabolic Panel     Standing Status:   Future     Number of Occurrences:   1     Order Specific Question:   Release to patient     Answer:   Routine Release [[id number]]    Iron and TIBC     Standing Status:   Future     Number of Occurrences:   1     Standing Expiration Date:   10/1/2025     Order Specific Question:   Release to patient     Answer:   Routine Release [[id number]]    Ferritin     Standing Status:   Future     Number of Occurrences:   1     Standing Expiration Date:   10/1/2025     Order Specific Question:   Release to patient     Answer:   Routine Release [[id number]]             Follow Up   Return in about 1 year (around 10/1/2025) for Medicare Wellness.  Patient was given instructions and counseling regarding her condition or for health maintenance advice. Please see specific information  pulled into the AVS if appropriate.

## 2024-10-02 LAB
ALBUMIN SERPL-MCNC: 4 G/DL (ref 3.5–5.2)
ALBUMIN/GLOB SERPL: 0.9 G/DL
ALP SERPL-CCNC: 82 U/L (ref 39–117)
ALT SERPL W P-5'-P-CCNC: 12 U/L (ref 1–33)
ANION GAP SERPL CALCULATED.3IONS-SCNC: 11 MMOL/L (ref 5–15)
AST SERPL-CCNC: 16 U/L (ref 1–32)
BASOPHILS # BLD AUTO: 0.06 10*3/MM3 (ref 0–0.2)
BASOPHILS NFR BLD AUTO: 0.6 % (ref 0–1.5)
BILIRUB SERPL-MCNC: 0.4 MG/DL (ref 0–1.2)
BUN SERPL-MCNC: 16 MG/DL (ref 8–23)
BUN/CREAT SERPL: 15.5 (ref 7–25)
CALCIUM SPEC-SCNC: 9.5 MG/DL (ref 8.6–10.5)
CHLORIDE SERPL-SCNC: 101 MMOL/L (ref 98–107)
CO2 SERPL-SCNC: 28 MMOL/L (ref 22–29)
CREAT SERPL-MCNC: 1.03 MG/DL (ref 0.57–1)
DEPRECATED RDW RBC AUTO: 45.5 FL (ref 37–54)
EGFRCR SERPLBLD CKD-EPI 2021: 59.7 ML/MIN/1.73
EOSINOPHIL # BLD AUTO: 0.24 10*3/MM3 (ref 0–0.4)
EOSINOPHIL NFR BLD AUTO: 2.2 % (ref 0.3–6.2)
ERYTHROCYTE [DISTWIDTH] IN BLOOD BY AUTOMATED COUNT: 15.1 % (ref 12.3–15.4)
FERRITIN SERPL-MCNC: 506 NG/ML (ref 13–150)
GLOBULIN UR ELPH-MCNC: 4.4 GM/DL
GLUCOSE SERPL-MCNC: 92 MG/DL (ref 65–99)
HCT VFR BLD AUTO: 34.3 % (ref 34–46.6)
HGB BLD-MCNC: 10.8 G/DL (ref 12–15.9)
IMM GRANULOCYTES # BLD AUTO: 0.04 10*3/MM3 (ref 0–0.05)
IMM GRANULOCYTES NFR BLD AUTO: 0.4 % (ref 0–0.5)
IRON 24H UR-MRATE: 19 MCG/DL (ref 37–145)
IRON SATN MFR SERPL: 8 % (ref 20–50)
LYMPHOCYTES # BLD AUTO: 1.45 10*3/MM3 (ref 0.7–3.1)
LYMPHOCYTES NFR BLD AUTO: 13.4 % (ref 19.6–45.3)
MCH RBC QN AUTO: 26.6 PG (ref 26.6–33)
MCHC RBC AUTO-ENTMCNC: 31.5 G/DL (ref 31.5–35.7)
MCV RBC AUTO: 84.5 FL (ref 79–97)
MONOCYTES # BLD AUTO: 0.65 10*3/MM3 (ref 0.1–0.9)
MONOCYTES NFR BLD AUTO: 6 % (ref 5–12)
NEUTROPHILS NFR BLD AUTO: 77.4 % (ref 42.7–76)
NEUTROPHILS NFR BLD AUTO: 8.35 10*3/MM3 (ref 1.7–7)
NRBC BLD AUTO-RTO: 0 /100 WBC (ref 0–0.2)
PLATELET # BLD AUTO: 489 10*3/MM3 (ref 140–450)
PMV BLD AUTO: 9.1 FL (ref 6–12)
POTASSIUM SERPL-SCNC: 4.5 MMOL/L (ref 3.5–5.2)
PROT SERPL-MCNC: 8.4 G/DL (ref 6–8.5)
RBC # BLD AUTO: 4.06 10*6/MM3 (ref 3.77–5.28)
SODIUM SERPL-SCNC: 140 MMOL/L (ref 136–145)
TIBC SERPL-MCNC: 228 MCG/DL (ref 298–536)
TRANSFERRIN SERPL-MCNC: 153 MG/DL (ref 200–360)
WBC NRBC COR # BLD AUTO: 10.79 10*3/MM3 (ref 3.4–10.8)

## 2024-10-06 ENCOUNTER — HOSPITAL ENCOUNTER (INPATIENT)
Facility: HOSPITAL | Age: 69
LOS: 3 days | Discharge: HOME | DRG: 988 | End: 2024-10-09
Attending: EMERGENCY MEDICINE | Admitting: INTERNAL MEDICINE
Payer: MEDICARE

## 2024-10-06 ENCOUNTER — APPOINTMENT (OUTPATIENT)
Dept: CT IMAGING | Facility: HOSPITAL | Age: 69
DRG: 988 | End: 2024-10-06
Payer: MEDICARE

## 2024-10-06 ENCOUNTER — APPOINTMENT (OUTPATIENT)
Dept: GENERAL RADIOLOGY | Facility: HOSPITAL | Age: 69
DRG: 988 | End: 2024-10-06
Payer: MEDICARE

## 2024-10-06 DIAGNOSIS — R11.2 NAUSEA AND VOMITING, UNSPECIFIED VOMITING TYPE: Primary | ICD-10-CM

## 2024-10-06 DIAGNOSIS — R19.00 PELVIC MASS: ICD-10-CM

## 2024-10-06 DIAGNOSIS — N13.30 HYDRONEPHROSIS OF RIGHT KIDNEY: ICD-10-CM

## 2024-10-06 PROBLEM — K56.609 SBO (SMALL BOWEL OBSTRUCTION): Status: ACTIVE | Noted: 2024-10-06

## 2024-10-06 LAB
ALBUMIN SERPL-MCNC: 4 G/DL (ref 3.5–5.2)
ALBUMIN/GLOB SERPL: 0.9 G/DL
ALP SERPL-CCNC: 87 U/L (ref 39–117)
ALT SERPL W P-5'-P-CCNC: 9 U/L (ref 1–33)
ANION GAP SERPL CALCULATED.3IONS-SCNC: 15.4 MMOL/L (ref 5–15)
AST SERPL-CCNC: 19 U/L (ref 1–32)
BACTERIA UR QL AUTO: ABNORMAL /HPF
BASOPHILS # BLD AUTO: 0.02 10*3/MM3 (ref 0–0.2)
BASOPHILS NFR BLD AUTO: 0.1 % (ref 0–1.5)
BILIRUB SERPL-MCNC: 0.6 MG/DL (ref 0–1.2)
BILIRUB UR QL STRIP: NEGATIVE
BUN SERPL-MCNC: 27 MG/DL (ref 8–23)
BUN/CREAT SERPL: 26 (ref 7–25)
CALCIUM SPEC-SCNC: 9.4 MG/DL (ref 8.6–10.5)
CHLORIDE SERPL-SCNC: 95 MMOL/L (ref 98–107)
CLARITY UR: CLEAR
CO2 SERPL-SCNC: 27.6 MMOL/L (ref 22–29)
COLOR UR: YELLOW
CREAT SERPL-MCNC: 1.04 MG/DL (ref 0.57–1)
DEPRECATED RDW RBC AUTO: 46.8 FL (ref 37–54)
EGFRCR SERPLBLD CKD-EPI 2021: 59 ML/MIN/1.73
EOSINOPHIL # BLD AUTO: 0 10*3/MM3 (ref 0–0.4)
EOSINOPHIL NFR BLD AUTO: 0 % (ref 0.3–6.2)
ERYTHROCYTE [DISTWIDTH] IN BLOOD BY AUTOMATED COUNT: 15.4 % (ref 12.3–15.4)
FLUAV SUBTYP SPEC NAA+PROBE: NOT DETECTED
FLUBV RNA ISLT QL NAA+PROBE: NOT DETECTED
GLOBULIN UR ELPH-MCNC: 4.6 GM/DL
GLUCOSE BLDC GLUCOMTR-MCNC: 117 MG/DL (ref 70–105)
GLUCOSE SERPL-MCNC: 158 MG/DL (ref 65–99)
GLUCOSE UR STRIP-MCNC: NEGATIVE MG/DL
HCT VFR BLD AUTO: 34.1 % (ref 34–46.6)
HGB BLD-MCNC: 10.7 G/DL (ref 12–15.9)
HGB UR QL STRIP.AUTO: NEGATIVE
HYALINE CASTS UR QL AUTO: ABNORMAL /LPF
IMM GRANULOCYTES # BLD AUTO: 0.07 10*3/MM3 (ref 0–0.05)
IMM GRANULOCYTES NFR BLD AUTO: 0.5 % (ref 0–0.5)
KETONES UR QL STRIP: ABNORMAL
LEUKOCYTE ESTERASE UR QL STRIP.AUTO: ABNORMAL
LIPASE SERPL-CCNC: 11 U/L (ref 13–60)
LYMPHOCYTES # BLD AUTO: 0.41 10*3/MM3 (ref 0.7–3.1)
LYMPHOCYTES NFR BLD AUTO: 3 % (ref 19.6–45.3)
MCH RBC QN AUTO: 25.9 PG (ref 26.6–33)
MCHC RBC AUTO-ENTMCNC: 31.4 G/DL (ref 31.5–35.7)
MCV RBC AUTO: 82.6 FL (ref 79–97)
MONOCYTES # BLD AUTO: 0.47 10*3/MM3 (ref 0.1–0.9)
MONOCYTES NFR BLD AUTO: 3.4 % (ref 5–12)
NEUTROPHILS NFR BLD AUTO: 12.83 10*3/MM3 (ref 1.7–7)
NEUTROPHILS NFR BLD AUTO: 93 % (ref 42.7–76)
NITRITE UR QL STRIP: NEGATIVE
NRBC BLD AUTO-RTO: 0 /100 WBC (ref 0–0.2)
PH UR STRIP.AUTO: 7 [PH] (ref 5–8)
PLATELET # BLD AUTO: 578 10*3/MM3 (ref 140–450)
PMV BLD AUTO: 8.6 FL (ref 6–12)
POTASSIUM SERPL-SCNC: 4 MMOL/L (ref 3.5–5.2)
PROT SERPL-MCNC: 8.6 G/DL (ref 6–8.5)
PROT UR QL STRIP: ABNORMAL
RBC # BLD AUTO: 4.13 10*6/MM3 (ref 3.77–5.28)
RBC # UR STRIP: ABNORMAL /HPF
REF LAB TEST METHOD: ABNORMAL
SARS-COV-2 RNA RESP QL NAA+PROBE: NOT DETECTED
SODIUM SERPL-SCNC: 138 MMOL/L (ref 136–145)
SP GR UR STRIP: 1.02 (ref 1–1.03)
SQUAMOUS #/AREA URNS HPF: ABNORMAL /HPF
UROBILINOGEN UR QL STRIP: ABNORMAL
WBC # UR STRIP: ABNORMAL /HPF
WBC NRBC COR # BLD AUTO: 13.8 10*3/MM3 (ref 3.4–10.8)

## 2024-10-06 PROCEDURE — 83690 ASSAY OF LIPASE: CPT | Performed by: EMERGENCY MEDICINE

## 2024-10-06 PROCEDURE — 82948 REAGENT STRIP/BLOOD GLUCOSE: CPT

## 2024-10-06 PROCEDURE — 70250 X-RAY EXAM OF SKULL: CPT

## 2024-10-06 PROCEDURE — 85025 COMPLETE CBC W/AUTO DIFF WBC: CPT | Performed by: EMERGENCY MEDICINE

## 2024-10-06 PROCEDURE — 25010000002 LABETALOL 5 MG/ML SOLUTION: Performed by: EMERGENCY MEDICINE

## 2024-10-06 PROCEDURE — 99285 EMERGENCY DEPT VISIT HI MDM: CPT

## 2024-10-06 PROCEDURE — 25010000002 PROCHLORPERAZINE 10 MG/2ML SOLUTION: Performed by: INTERNAL MEDICINE

## 2024-10-06 PROCEDURE — 80053 COMPREHEN METABOLIC PANEL: CPT | Performed by: EMERGENCY MEDICINE

## 2024-10-06 PROCEDURE — 87636 SARSCOV2 & INF A&B AMP PRB: CPT | Performed by: EMERGENCY MEDICINE

## 2024-10-06 PROCEDURE — 25010000002 ONDANSETRON PER 1 MG: Performed by: EMERGENCY MEDICINE

## 2024-10-06 PROCEDURE — 25810000003 SODIUM CHLORIDE 0.9 % SOLUTION: Performed by: INTERNAL MEDICINE

## 2024-10-06 PROCEDURE — 70470 CT HEAD/BRAIN W/O & W/DYE: CPT

## 2024-10-06 PROCEDURE — 74176 CT ABD & PELVIS W/O CONTRAST: CPT

## 2024-10-06 PROCEDURE — 71260 CT THORAX DX C+: CPT

## 2024-10-06 PROCEDURE — 25010000002 ONDANSETRON PER 1 MG: Performed by: INTERNAL MEDICINE

## 2024-10-06 PROCEDURE — 25510000001 IOPAMIDOL PER 1 ML: Performed by: INTERNAL MEDICINE

## 2024-10-06 PROCEDURE — 25810000003 LACTATED RINGERS SOLUTION: Performed by: EMERGENCY MEDICINE

## 2024-10-06 PROCEDURE — 81001 URINALYSIS AUTO W/SCOPE: CPT | Performed by: EMERGENCY MEDICINE

## 2024-10-06 PROCEDURE — 74177 CT ABD & PELVIS W/CONTRAST: CPT

## 2024-10-06 RX ORDER — NITROGLYCERIN 0.4 MG/1
0.4 TABLET SUBLINGUAL
Status: DISCONTINUED | OUTPATIENT
Start: 2024-10-06 | End: 2024-10-09 | Stop reason: HOSPADM

## 2024-10-06 RX ORDER — HYDRALAZINE HYDROCHLORIDE 20 MG/ML
10 INJECTION INTRAMUSCULAR; INTRAVENOUS EVERY 6 HOURS PRN
Status: DISCONTINUED | OUTPATIENT
Start: 2024-10-06 | End: 2024-10-09 | Stop reason: HOSPADM

## 2024-10-06 RX ORDER — PROCHLORPERAZINE EDISYLATE 5 MG/ML
2.5 INJECTION INTRAMUSCULAR; INTRAVENOUS EVERY 6 HOURS PRN
Status: DISCONTINUED | OUTPATIENT
Start: 2024-10-06 | End: 2024-10-09 | Stop reason: HOSPADM

## 2024-10-06 RX ORDER — ONDANSETRON 4 MG/1
4 TABLET, ORALLY DISINTEGRATING ORAL EVERY 6 HOURS PRN
Status: DISCONTINUED | OUTPATIENT
Start: 2024-10-06 | End: 2024-10-09 | Stop reason: HOSPADM

## 2024-10-06 RX ORDER — NICOTINE POLACRILEX 4 MG
15 LOZENGE BUCCAL
Status: DISCONTINUED | OUTPATIENT
Start: 2024-10-06 | End: 2024-10-09 | Stop reason: HOSPADM

## 2024-10-06 RX ORDER — ACETAMINOPHEN 160 MG/5ML
650 SOLUTION ORAL EVERY 4 HOURS PRN
Status: DISCONTINUED | OUTPATIENT
Start: 2024-10-06 | End: 2024-10-09 | Stop reason: HOSPADM

## 2024-10-06 RX ORDER — SODIUM CHLORIDE 0.9 % (FLUSH) 0.9 %
10 SYRINGE (ML) INJECTION EVERY 12 HOURS SCHEDULED
Status: DISCONTINUED | OUTPATIENT
Start: 2024-10-06 | End: 2024-10-09 | Stop reason: HOSPADM

## 2024-10-06 RX ORDER — IOPAMIDOL 755 MG/ML
100 INJECTION, SOLUTION INTRAVASCULAR
Status: COMPLETED | OUTPATIENT
Start: 2024-10-06 | End: 2024-10-06

## 2024-10-06 RX ORDER — DEXTROSE MONOHYDRATE 25 G/50ML
25 INJECTION, SOLUTION INTRAVENOUS
Status: DISCONTINUED | OUTPATIENT
Start: 2024-10-06 | End: 2024-10-09 | Stop reason: HOSPADM

## 2024-10-06 RX ORDER — SODIUM CHLORIDE 0.9 % (FLUSH) 0.9 %
10 SYRINGE (ML) INJECTION AS NEEDED
Status: DISCONTINUED | OUTPATIENT
Start: 2024-10-06 | End: 2024-10-09 | Stop reason: HOSPADM

## 2024-10-06 RX ORDER — ACETAMINOPHEN 325 MG/1
650 TABLET ORAL EVERY 4 HOURS PRN
Status: DISCONTINUED | OUTPATIENT
Start: 2024-10-06 | End: 2024-10-09 | Stop reason: HOSPADM

## 2024-10-06 RX ORDER — PANTOPRAZOLE SODIUM 40 MG/10ML
40 INJECTION, POWDER, LYOPHILIZED, FOR SOLUTION INTRAVENOUS
Status: DISCONTINUED | OUTPATIENT
Start: 2024-10-06 | End: 2024-10-09 | Stop reason: HOSPADM

## 2024-10-06 RX ORDER — SODIUM CHLORIDE 9 MG/ML
75 INJECTION, SOLUTION INTRAVENOUS CONTINUOUS
Status: DISPENSED | OUTPATIENT
Start: 2024-10-06 | End: 2024-10-08

## 2024-10-06 RX ORDER — ACETAMINOPHEN 650 MG/1
650 SUPPOSITORY RECTAL EVERY 4 HOURS PRN
Status: DISCONTINUED | OUTPATIENT
Start: 2024-10-06 | End: 2024-10-09 | Stop reason: HOSPADM

## 2024-10-06 RX ORDER — LABETALOL HYDROCHLORIDE 5 MG/ML
20 INJECTION, SOLUTION INTRAVENOUS ONCE
Status: COMPLETED | OUTPATIENT
Start: 2024-10-06 | End: 2024-10-06

## 2024-10-06 RX ORDER — ONDANSETRON 2 MG/ML
4 INJECTION INTRAMUSCULAR; INTRAVENOUS EVERY 6 HOURS PRN
Status: DISCONTINUED | OUTPATIENT
Start: 2024-10-06 | End: 2024-10-09 | Stop reason: HOSPADM

## 2024-10-06 RX ORDER — PANTOPRAZOLE SODIUM 40 MG/10ML
80 INJECTION, POWDER, LYOPHILIZED, FOR SOLUTION INTRAVENOUS ONCE
Status: COMPLETED | OUTPATIENT
Start: 2024-10-06 | End: 2024-10-06

## 2024-10-06 RX ORDER — IBUPROFEN 600 MG/1
1 TABLET ORAL
Status: DISCONTINUED | OUTPATIENT
Start: 2024-10-06 | End: 2024-10-09 | Stop reason: HOSPADM

## 2024-10-06 RX ORDER — ALUMINA, MAGNESIA, AND SIMETHICONE 2400; 2400; 240 MG/30ML; MG/30ML; MG/30ML
15 SUSPENSION ORAL EVERY 6 HOURS PRN
Status: DISCONTINUED | OUTPATIENT
Start: 2024-10-06 | End: 2024-10-09 | Stop reason: HOSPADM

## 2024-10-06 RX ORDER — ONDANSETRON 2 MG/ML
8 INJECTION INTRAMUSCULAR; INTRAVENOUS ONCE
Status: COMPLETED | OUTPATIENT
Start: 2024-10-06 | End: 2024-10-06

## 2024-10-06 RX ADMIN — SODIUM CHLORIDE 75 ML/HR: 9 INJECTION, SOLUTION INTRAVENOUS at 15:45

## 2024-10-06 RX ADMIN — Medication 10 ML: at 14:21

## 2024-10-06 RX ADMIN — IOPAMIDOL 100 ML: 755 INJECTION, SOLUTION INTRAVENOUS at 15:02

## 2024-10-06 RX ADMIN — LABETALOL HYDROCHLORIDE 20 MG: 5 INJECTION, SOLUTION INTRAVENOUS at 11:13

## 2024-10-06 RX ADMIN — SODIUM CHLORIDE, POTASSIUM CHLORIDE, SODIUM LACTATE AND CALCIUM CHLORIDE 1000 ML: 600; 310; 30; 20 INJECTION, SOLUTION INTRAVENOUS at 14:18

## 2024-10-06 RX ADMIN — ONDANSETRON 8 MG: 2 INJECTION, SOLUTION INTRAMUSCULAR; INTRAVENOUS at 09:33

## 2024-10-06 RX ADMIN — SODIUM CHLORIDE, POTASSIUM CHLORIDE, SODIUM LACTATE AND CALCIUM CHLORIDE 1000 ML: 600; 310; 30; 20 INJECTION, SOLUTION INTRAVENOUS at 09:48

## 2024-10-06 RX ADMIN — PANTOPRAZOLE SODIUM 40 MG: 40 INJECTION, POWDER, FOR SOLUTION INTRAVENOUS at 16:59

## 2024-10-06 RX ADMIN — PANTOPRAZOLE SODIUM 80 MG: 40 INJECTION, POWDER, FOR SOLUTION INTRAVENOUS at 09:33

## 2024-10-06 RX ADMIN — PROCHLORPERAZINE EDISYLATE 2.5 MG: 5 INJECTION INTRAMUSCULAR; INTRAVENOUS at 16:59

## 2024-10-06 RX ADMIN — Medication 10 ML: at 20:56

## 2024-10-06 RX ADMIN — PROCHLORPERAZINE EDISYLATE 2.5 MG: 5 INJECTION INTRAMUSCULAR; INTRAVENOUS at 23:02

## 2024-10-06 RX ADMIN — ONDANSETRON 4 MG: 2 INJECTION, SOLUTION INTRAMUSCULAR; INTRAVENOUS at 14:21

## 2024-10-06 NOTE — H&P
History and Physical   Marilyn Pineda : 1955 MRN:[id number] LOS:0     Reason for admission: <principal problem not specified>     Assessment / Plan     # Nausea and vomiting secondary to ? SBO  # Abdominal soft tissue density in right hemipelvis, malignancy to exclude  #Severe right-sided hydronephrosis  -Patient presented with nausea and vomiting - brown / blackish color   -CTAP with partial versus early complete small bowel obstruction.  -Patient has abnormal soft tissue density in right hemipelvis as well.  -Right severe hydronephrosis present  -there is wall thickening of the cecum favored to be secondary to post XRT change although superimposed active inflammation or neoplasm cannot be definitively excluded.   -GI/general surgery/urologist and heme-onc consult  -PPI bid   -N.p.o. IV fluid hypoglycemia protocol          Nutrition: No diet orders on file     DVT Prophylaxis: No Active Pharmacologic or Mechanical VTE Prophylaxis AND No VTE Prophylaxis Contraindications Documented   - Select Appropriate VTE Prophylaxis      History of Present illness     A 68 y.o. old female patient with PMH of cervical cancer status post radiation and hysterectomy in  presents to the hospital with complaints of nausea and vomiting.  Patient is vomiting brown/blackish color vomitus.  Last time bowel movement is yesterday and patient has been having diarrhea for past 1 week.  Patient labs with mild leukocytosis.  Creatinine of 1.  CT abdominal pelvis without contrast showing there is abnormal soft tissue density in right hemipelvis can be fibrosis and or post radiation changesAbnormal soft tissue density centered in the right hemipelvis, nonspecific in appearance but suspected to largely represent fibrosis and/or post XRT changes. Other possibilities such as underlying neoplasm or infection are considered less likely, however, not definitively excluded.Partial versus early complete small bowel obstruction in this  region.  Apparent wall thickening of the cecum favored to be secondary to post XRT change although superimposed active inflammation or neoplasm cannot be definitively excluded.Obstruction of the distal right ureter by the above process with resulting severe right hydronephrosis and hydroureter. Severe right renal parenchymal atrophy.Mesentery and right inguinal adenopathy in the lower abdomen and pelvis could be inflammatory or neoplastic in etiology.Multiple vascular collaterals noted in the anterior pelvic wall suggesting some degree of obstruction of the right external and internal iliac artery and veins. Evaluation is limited by the lack of IV contrast.  Will get CT abdomen and pelvis with contrast.  GI general surgery heme-onc consulted.      Subjective / Review of systems     Review of Systems   Nauseated     Past Medical/Surgical/Social/Family History & Allergies     Past Medical History:   Diagnosis Date    Allergic     amoxicilin clayvix    Anemia     Arthritis 2024    In right  foot    Cancer     cervial cancer/ radiation and had hysterectomy    Pain of right hip joint 2018    Right lower lobe pneumonia 2018    Thrush, oral 2018      Past Surgical History:   Procedure Laterality Date     SECTION  1984    HAND SURGERY      HYSTERECTOMY        Social History     Socioeconomic History    Marital status:    Tobacco Use    Smoking status: Never    Smokeless tobacco: Never   Vaping Use    Vaping status: Never Used   Substance and Sexual Activity    Alcohol use: Not Currently     Comment: may drink a little wine but only very occasionally    Drug use: No    Sexual activity: Yes     Partners: Male     Birth control/protection: None     Comment: have had hysterectomy      Family History   Problem Relation Age of Onset    COPD Mother         from smoking for years    Miscarriages / Stillbirths Mother         had a miscarriage    Thyroid disease Father         is  taking thyroid pil;    Cancer Maternal Grandmother         had lung cancer      Allergies   Allergen Reactions    Augmentin [Amoxicillin-Pot Clavulanate] Hives        Home Medications     Prior to Admission medications    Medication Sig Start Date End Date Taking? Authorizing Provider   BIOTIN PO Take 1 tablet by mouth Daily.    Erick Mathur MD   cyanocobalamin (VITAMIN B-12) 2500 MCG tablet tablet Take 500 mcg by mouth Daily.    Erick Mathur MD   Ferrous Sulfate (IRON PO) Take 65 mg by mouth Daily.    Erick Mathur MD   Misc Natural Products (BEET ROOT PO) Take 500 mg by mouth Daily.    Erick Mathur MD   Multiple Minerals-Vitamins (Calcium-Magnesium-Zinc-D3) tablet Take 1 tablet by mouth Daily.    Erick Mathur MD   multivitamin with minerals (MULTIVITAMIN WOMEN PO) Take 1 tablet by mouth Daily.    Erick Mathur MD   vitamin B-6 (PYRIDOXINE) 50 MG tablet Take 2 tablets by mouth Daily.    Erick Mathur MD      Objective / Physical Exam   Vital signs:  Temp: 97.8 °F (36.6 °C)  BP: (!) 185/103  Heart Rate: 75  Resp: 18  SpO2: 98 %  Weight: 87 kg (191 lb 12.8 oz)    Admission Weight: Weight: 87 kg (191 lb 12.8 oz)    Physical Exam   Physical Exam  HENT:      Head: Normocephalic and atraumatic.      Nose: Nose normal.   Eyes:      Extraocular Movements: Extraocular movements intact.      Conjunctivae/sclera: Conjunctivae normal.      Pupils: Pupils are equal, round, and reactive to light.   Cardiovascular:      Rate and Rhythm: normal       Pulses: Normal pulses.      Heart sounds: Normal heart sounds.   Pulmonary:      Effort: normal      Breath sounds: normal   Abdominal:   Mild abdo tenderness   Musculoskeletal:         General: Normal range of motion.      Cervical back: Normal range of motion and neck supple.   Skin:     General: Skin is dry.   Neurological:      General: No focal deficit present.      Mental Status: alert.   Psychiatric:         Mood and  Affect: Mood normal.        Labs     Results from last 7 days   Lab Units 10/06/24  0937 10/01/24  1317   WBC 10*3/mm3 13.80* 10.79   HEMATOCRIT % 34.1 34.3   PLATELETS 10*3/mm3 578* 489*      Results from last 7 days   Lab Units 10/06/24  0937 10/01/24  1317   SODIUM mmol/L 138 140   POTASSIUM mmol/L 4.0 4.5   CHLORIDE mmol/L 95* 101   CO2 mmol/L 27.6 28.0   BUN mg/dL 27* 16   CREATININE mg/dL 1.04* 1.03*        Current Medications   Scheduled Meds:lactated ringers, 1,000 mL, Intravenous, Once         Continuous Infusions:      Reginald Diop MD  [facility]   10/06/24   13:08 EDT

## 2024-10-06 NOTE — ED PROVIDER NOTES
Subjective   History of Present Illness  Chief complaint abdominal pain diarrhea vomiting    History of present illness 68-year-old female who complains of a several day history of some diarrhea nonbloody with some abdominal cramping and pain she is now been vomiting since last night.  Nonbilious it was brown.  Concerned about blood.  There is been no fever chills no ill exposures foreign travels antibiotic use or recent hospitalization or injury or trauma.  Nothing otherwise seem to trigger or make it better or worse denies any urinary complaints no chest pain neck arm jaw pain or shortness of breath.  No leg pain or swelling.      Review of Systems   Constitutional:  Negative for chills and fever.   Respiratory:  Negative for cough, chest tightness and shortness of breath.    Cardiovascular:  Negative for chest pain.   Gastrointestinal:  Positive for abdominal pain, diarrhea and vomiting. Negative for blood in stool.   Genitourinary:  Negative for difficulty urinating and dysuria.   Musculoskeletal:  Negative for back pain and neck pain.   Skin:  Negative for rash.   Neurological:  Negative for dizziness and light-headedness.   Psychiatric/Behavioral:  Negative for confusion.        Past Medical History:   Diagnosis Date    Allergic     amoxicilin clayvix    Anemia     Arthritis 2024    In right  foot    Cancer 1993    cervial cancer/ radiation and had hysterectomy    Pain of right hip joint 2018    Right lower lobe pneumonia 2018    Thrush, oral 2018       Allergies   Allergen Reactions    Augmentin [Amoxicillin-Pot Clavulanate] Hives       Past Surgical History:   Procedure Laterality Date     SECTION  1984    HAND SURGERY      HYSTERECTOMY         Family History   Problem Relation Age of Onset    COPD Mother         from smoking for years    Miscarriages / Stillbirths Mother         had a miscarriage    Thyroid disease Father         is taking thyroid pil;    Cancer  Maternal Grandmother         had lung cancer       Social History     Socioeconomic History    Marital status:    Tobacco Use    Smoking status: Never    Smokeless tobacco: Never   Vaping Use    Vaping status: Never Used   Substance and Sexual Activity    Alcohol use: Not Currently     Comment: may drink a little wine but only very occasionally    Drug use: No    Sexual activity: Yes     Partners: Male     Birth control/protection: None     Comment: have had hysterectomy     Prior to Admission medications    Medication Sig Start Date End Date Taking? Authorizing Provider   BIOTIN PO Take 1 tablet by mouth Daily.   Yes Erick Mathur MD   cyanocobalamin (VITAMIN B-12) 2500 MCG tablet tablet Take 500 mcg by mouth Daily.   Yes Erick Mathur MD   Ferrous Sulfate (IRON PO) Take 65 mg by mouth Daily.   Yes Erick Mathur MD   Misc Natural Products (BEET ROOT PO) Take 500 mg by mouth Daily.   Yes Erick Mathur MD   Multiple Minerals-Vitamins (Calcium-Magnesium-Zinc-D3) tablet Take 1 tablet by mouth Daily.   Yes Erick Mathur MD   multivitamin with minerals (MULTIVITAMIN WOMEN PO) Take 1 tablet by mouth Daily.   Yes Erick Mathur MD   vitamin B-6 (PYRIDOXINE) 50 MG tablet Take 2 tablets by mouth Daily.   Yes Erick Mathur MD          Objective   Physical Exam  Institutional this is a 68-year-old female awake alert no distress triage vital signs reviewed blood pressure is elevated.  HEENT extraocular muscles are intact pupils equal round reactive no photophobia or papilledema.  Mouth clear.  Neck supple no adenopathy no JV no bruits no meningeal signs.  Lungs clear no retraction no use of accessory heart regular without murmur rub abdomen soft with some mild right lower abdominal pain no rebound no guarding good bowel sounds no peritoneal findings or pulsatile masses extremities pulses equal upper and lower extremities some edema right leg no cords or Homans' sign  no evidence of DVT skin is warm and dry without rashes or cellulitic changes neurologic awake alert and follows commands motor strength normal without focal weakness.  Back no CVA tenderness or spine tenderness.  Procedures           ED Course      Results for orders placed or performed during the hospital encounter of 10/06/24   COVID-19 and FLU A/B PCR, 1 HR TAT - Swab, Nasopharynx    Specimen: Nasopharynx; Swab   Result Value Ref Range    COVID19 Not Detected Not Detected - Ref. Range    Influenza A PCR Not Detected Not Detected    Influenza B PCR Not Detected Not Detected   Comprehensive Metabolic Panel    Specimen: Blood   Result Value Ref Range    Glucose 158 (H) 65 - 99 mg/dL    BUN 27 (H) 8 - 23 mg/dL    Creatinine 1.04 (H) 0.57 - 1.00 mg/dL    Sodium 138 136 - 145 mmol/L    Potassium 4.0 3.5 - 5.2 mmol/L    Chloride 95 (L) 98 - 107 mmol/L    CO2 27.6 22.0 - 29.0 mmol/L    Calcium 9.4 8.6 - 10.5 mg/dL    Total Protein 8.6 (H) 6.0 - 8.5 g/dL    Albumin 4.0 3.5 - 5.2 g/dL    ALT (SGPT) 9 1 - 33 U/L    AST (SGOT) 19 1 - 32 U/L    Alkaline Phosphatase 87 39 - 117 U/L    Total Bilirubin 0.6 0.0 - 1.2 mg/dL    Globulin 4.6 gm/dL    A/G Ratio 0.9 g/dL    BUN/Creatinine Ratio 26.0 (H) 7.0 - 25.0    Anion Gap 15.4 (H) 5.0 - 15.0 mmol/L    eGFR 59.0 (L) >60.0 mL/min/1.73   Lipase    Specimen: Blood   Result Value Ref Range    Lipase 11 (L) 13 - 60 U/L   Urinalysis With Microscopic If Indicated (No Culture) - Urine, Clean Catch    Specimen: Urine, Clean Catch   Result Value Ref Range    Color, UA Yellow Yellow, Straw    Appearance, UA Clear Clear    pH, UA 7.0 5.0 - 8.0    Specific Gravity, UA 1.022 1.005 - 1.030    Glucose, UA Negative Negative    Ketones, UA 15 mg/dL (1+) (A) Negative    Bilirubin, UA Negative Negative    Blood, UA Negative Negative    Protein, UA 30 mg/dL (1+) (A) Negative    Leuk Esterase, UA Small (1+) (A) Negative    Nitrite, UA Negative Negative    Urobilinogen, UA 1.0 E.U./dL 0.2 - 1.0 E.U./dL    CBC Auto Differential    Specimen: Blood   Result Value Ref Range    WBC 13.80 (H) 3.40 - 10.80 10*3/mm3    RBC 4.13 3.77 - 5.28 10*6/mm3    Hemoglobin 10.7 (L) 12.0 - 15.9 g/dL    Hematocrit 34.1 34.0 - 46.6 %    MCV 82.6 79.0 - 97.0 fL    MCH 25.9 (L) 26.6 - 33.0 pg    MCHC 31.4 (L) 31.5 - 35.7 g/dL    RDW 15.4 12.3 - 15.4 %    RDW-SD 46.8 37.0 - 54.0 fl    MPV 8.6 6.0 - 12.0 fL    Platelets 578 (H) 140 - 450 10*3/mm3    Neutrophil % 93.0 (H) 42.7 - 76.0 %    Lymphocyte % 3.0 (L) 19.6 - 45.3 %    Monocyte % 3.4 (L) 5.0 - 12.0 %    Eosinophil % 0.0 (L) 0.3 - 6.2 %    Basophil % 0.1 0.0 - 1.5 %    Immature Grans % 0.5 0.0 - 0.5 %    Neutrophils, Absolute 12.83 (H) 1.70 - 7.00 10*3/mm3    Lymphocytes, Absolute 0.41 (L) 0.70 - 3.10 10*3/mm3    Monocytes, Absolute 0.47 0.10 - 0.90 10*3/mm3    Eosinophils, Absolute 0.00 0.00 - 0.40 10*3/mm3    Basophils, Absolute 0.02 0.00 - 0.20 10*3/mm3    Immature Grans, Absolute 0.07 (H) 0.00 - 0.05 10*3/mm3    nRBC 0.0 0.0 - 0.2 /100 WBC   Urinalysis, Microscopic Only - Urine, Clean Catch    Specimen: Urine, Clean Catch   Result Value Ref Range    RBC, UA 0-2 None Seen, 0-2 /HPF    WBC, UA 3-5 (A) None Seen, 0-2 /HPF    Bacteria, UA None Seen None Seen /HPF    Squamous Epithelial Cells, UA 13-20 (A) None Seen, 0-2 /HPF    Hyaline Casts, UA 7-12 None Seen /LPF    Methodology Manual Light Microscopy      CT Abdomen Pelvis Without Contrast    Result Date: 10/6/2024  Impression: 1.Abnormal soft tissue density centered in the right hemipelvis, nonspecific in appearance but suspected to largely represent fibrosis and/or post XRT changes. Other possibilities such as underlying neoplasm or infection are considered less likely, however, not definitively excluded. 2.Partial versus early complete small bowel obstruction in this region. 3.Apparent wall thickening of the cecum favored to be secondary to post XRT change although superimposed active inflammation or neoplasm cannot be  definitively excluded. 4.Obstruction of the distal right ureter by the above process with resulting severe right hydronephrosis and hydroureter. Severe right renal parenchymal atrophy. 5.Previous hysterectomy 6.Mesentery and right inguinal adenopathy in the lower abdomen and pelvis could be inflammatory or neoplastic in etiology. 7.Multiple vascular collaterals noted in the anterior pelvic wall suggesting some degree of obstruction of the right external and internal iliac artery and veins. Evaluation is limited by the lack of IV contrast. Electronically Signed: Young Blake MD  10/6/2024 11:03 AM EDT  Workstation ID: [id number]   Medications   sodium chloride 0.9 % flush 10 mL (has no administration in time range)   lactated ringers bolus 1,000 mL (0 mL Intravenous Stopped 10/6/24 1100)   ondansetron (ZOFRAN) injection 8 mg (8 mg Intravenous Given 10/6/24 0933)   pantoprazole (PROTONIX) injection 80 mg (80 mg Intravenous Given 10/6/24 0933)   labetalol (NORMODYNE,TRANDATE) injection 20 mg (20 mg Intravenous Given 10/6/24 1113)                                              Medical Decision Making  Medical decision making.  IV established monitor placement review of sinus rhythm.  Patient here was given Protonix 80 mg IV and Zofran 8 mg IV labs obtained my independent review COVID-19 flu all negative comprehensive metabolic profile remarkable BUN 27 and creatinine of 1.04.  Lipase normal patient's urinalysis unremarkable BC unremarkable and hemoglobin 10.7 white count 13,000.  Patient had a CT scan abdomen pelvis my independent review I do not see evidence of an aneurysm or any type of appendicitis or pancreatitis or diverticulitis perforation or free air she has some sort of issue on the pelvic area some sort of soft tissue density.  Radiology review shows abnormal soft tissue density centered in the right hemipelvis nonspecific but could be related to postradiation changes.  She has partial versus early complete small  bowel obstruction is region apparent wall thickening in the cecum.  Obstruction of the right distal ureter about the process resulting in right hydronephrosis and hydroureter.  Mesentery adenopathy noted.  Patient made aware of the findings.  Unclear exactly what this is but may be responsible for her ongoing lymphedema as well as her pain or discomfort.  Not sure if this relates to her diarrhea or vomiting.  Her urine looks okay I do not see any evidence of a kidney stone she has obstructive uropathy secondary to this soft tissue density.  We talked about the findings.  I do not see evidence of perforation or free air sepsis or bacteremia or aneurysm or any evidence suggest appendicitis diverticulitis or pancreatitis or ischemic bowel although not a complete list of all possibilities.  She had been given labetalol for her blood pressure was slowly trended down to the 168/100.  I talked to the hospitalist we discussed the case.  I talked to urologist Dr. Valerio the case was discussed and patient will be admitted for further care patient agreeable stable unremarkable ER course.    Problems Addressed:  Hydronephrosis of right kidney: complicated acute illness or injury  Nausea and vomiting, unspecified vomiting type: complicated acute illness or injury  Pelvic mass: complicated acute illness or injury    Amount and/or Complexity of Data Reviewed  Labs: ordered. Decision-making details documented in ED Course.  Radiology: ordered and independent interpretation performed. Decision-making details documented in ED Course.    Risk  Decision regarding hospitalization.        Final diagnoses:   Nausea and vomiting, unspecified vomiting type   Pelvic mass   Hydronephrosis of right kidney       ED Disposition  ED Disposition       ED Disposition   Intended Admit    Condition   --    Comment   --               No follow-up provider specified.       Medication List      No changes were made to your prescriptions during this  visit.            Elan Porter MD  10/06/24 [id number]

## 2024-10-06 NOTE — PLAN OF CARE
Goal Outcome Evaluation:  Plan of Care Reviewed With: patient, family        Progress: no change          Pt alert and able to make needs known. Pt VS stable. Pt with complaints of nausea treated per MAR. Personal items and call light within reach. Plan of care ongoing.

## 2024-10-06 NOTE — CASE MANAGEMENT/SOCIAL WORK
Discharge Planning Assessment   James     Patient Name: Marilyn Pineda  MRN: [id number]  Today's Date: 10/6/2024    Admit Date: 10/6/2024    Plan: home with spouse.  current with OP PT at [facility]   Discharge Needs Assessment       Row Name 10/06/24 1510       Living Environment    People in Home spouse    Current Living Arrangements home    Potentially Unsafe Housing Conditions none    In the past 12 months has the electric, gas, oil, or water company threatened to shut off services in your home? No    Primary Care Provided by self    Provides Primary Care For no one    Family Caregiver if Needed spouse    Quality of Family Relationships helpful;involved;supportive    Able to Return to Prior Arrangements yes       Resource/Environmental Concerns    Resource/Environmental Concerns none    Transportation Concerns none       Transportation Needs    In the past 12 months, has lack of transportation kept you from medical appointments or from getting medications? no    In the past 12 months, has lack of transportation kept you from meetings, work, or from getting things needed for daily living? No       Food Insecurity    Within the past 12 months, you worried that your food would run out before you got the money to buy more. Never true    Within the past 12 months, the food you bought just didn't last and you didn't have money to get more. Never true       Transition Planning    Patient/Family Anticipates Transition to home with family    Patient/Family Anticipated Services at Transition none    Transportation Anticipated family or friend will provide       Discharge Needs Assessment    Readmission Within the Last 30 Days no previous admission in last 30 days    Equipment Currently Used at Home none    Concerns to be Addressed denies needs/concerns at this time    Anticipated Changes Related to Illness none    Equipment Needed After Discharge none    Provided Post Acute Provider List? N/A                    Discharge Plan       Row Name 10/06/24 1510       Plan    Plan home with spouse.  current with OP PT at [facility]    Plan Comments met with pt, brother and children at bedside.  pt verified that she lives at home with her spouse.  currently completing physical therapy at [facility] for her foot.  pcp and pharm verified.  no home dme.  denies transportation, financial (Meds/food/utility) concerns at at this time.                  Continued Care and Services - Admitted Since 10/6/2024    No active coordination exists for this encounter.       Expected Discharge Date and Time       Expected Discharge Date Expected Discharge Time    Oct 8, 2024            Demographic Summary       Row Name 10/06/24 1510       General Information    Admission Type inpatient    Arrived From emergency department    Required Notices Provided Important Message from Medicare    Referral Source admission list    Reason for Consult discharge planning    Preferred Language English                   Functional Status       Row Name 10/06/24 1510       Functional Status    Usual Activity Tolerance good    Current Activity Tolerance good       Functional Status, IADL    Medications independent    Meal Preparation independent    Housekeeping independent    Laundry independent    Shopping independent       Mental Status    General Appearance WDL WDL       Mental Status Summary    Recent Changes in Mental Status/Cognitive Functioning no changes                       Patient Forms       Row Name 10/06/24 1510       Patient Forms    Important Message from Medicare (IMM) Delivered  imm 10/6 per reg                Iveth Dowell, CANDIDO RN  Case Management  [phone] office  [phone] fax

## 2024-10-06 NOTE — ED NOTES
Nursing report ED to floor  Marilyn Pineda  68 y.o.  female    HPI:   Chief Complaint   Patient presents with    Diarrhea    Vomiting    Weakness - Generalized       Admitting doctor:   Reginald Diop MD    Admitting diagnosis:   The primary encounter diagnosis was Nausea and vomiting, unspecified vomiting type. Diagnoses of Pelvic mass and Hydronephrosis of right kidney were also pertinent to this visit.    Code status:   Current Code Status       Date Active Code Status Order ID Comments User Context       Not on file            Allergies:   Augmentin [amoxicillin-pot clavulanate]    Isolation:  Enhanced Droplet/Contact      Fall Risk:  Fall Risk Assessment was completed, and patient is at low risk for falls.   Predictive Model Details         10 (Low) Factor Value    Calculated 10/6/2024 13:53 Age 68    Risk of Fall Model Active Peripheral IV Present     Imaging order in this encounter Present     Respiratory Rate 18     Magnesium not on file     Number of Distinct Medication Classes administered 4     Brent Scale not on file     Chloride 95 mmol/L     Number of administrations of Ulcer Drugs 1     Creatinine 1.04 mg/dL     Albumin 4 g/dL     Total Bilirubin 0.6 mg/dL     Gastrointestinal Assessment X     Diastolic BP 96     Days after Admission 0.204     Calcium 9.4 mg/dL     ALT 9 U/L     Potassium 4 mmol/L         Weight:       10/06/24  0850   Weight: 87 kg (191 lb 12.8 oz)       Intake and Output    Intake/Output Summary (Last 24 hours) at 10/6/2024 1358  Last data filed at 10/6/2024 1100  Gross per 24 hour   Intake 1000 ml   Output --   Net 1000 ml       Diet:   Dietary Orders (From admission, onward)       Start     Ordered    10/06/24 1310  NPO Diet NPO Type: Strict NPO  Diet Effective Now        Question:  NPO Type  Answer:  Strict NPO    10/06/24 1330                     Most recent vitals:   Vitals:    10/06/24 1147 10/06/24 1202 10/06/24 1217 10/06/24 1346   BP: (!) 194/112 (!) 190/112 (!) 185/103 146/96    BP Location:       Patient Position:       Pulse: 70 74 75 77   Resp:       Temp:       TempSrc:       SpO2: 98% 90% 98% 92%   Weight:       Height:           Active LDAs/IV Access:   Lines, Drains & Airways       Active LDAs       Name Placement date Placement time Site Days    Peripheral IV 10/06/24 0933 Right Antecubital 10/06/24  0933  Antecubital  less than 1                    Skin Condition:   Skin Assessments (last day)       None             Labs (abnormal labs have a star):   Labs Reviewed   COMPREHENSIVE METABOLIC PANEL - Abnormal; Notable for the following components:       Result Value    Glucose 158 (*)     BUN 27 (*)     Creatinine 1.04 (*)     Chloride 95 (*)     Total Protein 8.6 (*)     BUN/Creatinine Ratio 26.0 (*)     Anion Gap 15.4 (*)     eGFR 59.0 (*)     All other components within normal limits    Narrative:     GFR Normal >60  Chronic Kidney Disease <60  Kidney Failure <15     LIPASE - Abnormal; Notable for the following components:    Lipase 11 (*)     All other components within normal limits   URINALYSIS W/ MICROSCOPIC IF INDICATED (NO CULTURE) - Abnormal; Notable for the following components:    Ketones, UA 15 mg/dL (1+) (*)     Protein, UA 30 mg/dL (1+) (*)     Leuk Esterase, UA Small (1+) (*)     All other components within normal limits   CBC WITH AUTO DIFFERENTIAL - Abnormal; Notable for the following components:    WBC 13.80 (*)     Hemoglobin 10.7 (*)     MCH 25.9 (*)     MCHC 31.4 (*)     Platelets 578 (*)     Neutrophil % 93.0 (*)     Lymphocyte % 3.0 (*)     Monocyte % 3.4 (*)     Eosinophil % 0.0 (*)     Neutrophils, Absolute 12.83 (*)     Lymphocytes, Absolute 0.41 (*)     Immature Grans, Absolute 0.07 (*)     All other components within normal limits   URINALYSIS, MICROSCOPIC ONLY - Abnormal; Notable for the following components:    WBC, UA 3-5 (*)     Squamous Epithelial Cells, UA 13-20 (*)     All other components within normal limits   COVID-19 AND FLU A/B, NP SWAB IN  TRANSPORT MEDIA 1 HR TAT - Normal    Narrative:     Fact sheet for providers: https://www.fda.gov/media/596072/download    Fact sheet for patients: https://www.fda.gov/media/425968/download    Test performed by PCR.   GASTROINTESTINAL PANEL, PCR (PREFERRED) DOES NOT INCLUDE CDIFF   CBC AND DIFFERENTIAL    Narrative:     The following orders were created for panel order CBC & Differential.  Procedure                               Abnormality         Status                     ---------                               -----------         ------                     CBC Auto Differential[[id number]]        Abnormal            Final result                 Please view results for these tests on the individual orders.       LOC: Person, Place, Time, and Situation    Telemetry:  Telemetry    Cardiac Monitoring Ordered:     EKG:   No orders to display       Medications Given in the ED:   Medications   sodium chloride 0.9 % flush 10 mL (has no administration in time range)   lactated ringers bolus 1,000 mL (has no administration in time range)   sodium chloride 0.9 % flush 10 mL (has no administration in time range)   sodium chloride 0.9 % flush 10 mL (has no administration in time range)   nitroglycerin (NITROSTAT) SL tablet 0.4 mg (has no administration in time range)   Potassium Replacement - Follow Nurse / BPA Driven Protocol (has no administration in time range)   Magnesium Standard Dose Replacement - Follow Nurse / BPA Driven Protocol (has no administration in time range)   Phosphorus Replacement - Follow Nurse / BPA Driven Protocol (has no administration in time range)   Calcium Replacement - Follow Nurse / BPA Driven Protocol (has no administration in time range)   acetaminophen (TYLENOL) tablet 650 mg (has no administration in time range)     Or   acetaminophen (TYLENOL) 160 MG/5ML oral solution 650 mg (has no administration in time range)     Or   acetaminophen (TYLENOL) suppository 650 mg (has no administration in  time range)   melatonin tablet 5 mg (has no administration in time range)   ondansetron ODT (ZOFRAN-ODT) disintegrating tablet 4 mg (has no administration in time range)     Or   ondansetron (ZOFRAN) injection 4 mg (has no administration in time range)   aluminum-magnesium hydroxide-simethicone (MAALOX MAX) 400-400-40 MG/5ML suspension 15 mL (has no administration in time range)   hydrALAZINE (APRESOLINE) injection 10 mg (has no administration in time range)   lactated ringers bolus 1,000 mL (0 mL Intravenous Stopped 10/6/24 1100)   ondansetron (ZOFRAN) injection 8 mg (8 mg Intravenous Given 10/6/24 0933)   pantoprazole (PROTONIX) injection 80 mg (80 mg Intravenous Given 10/6/24 0933)   labetalol (NORMODYNE,TRANDATE) injection 20 mg (20 mg Intravenous Given 10/6/24 1113)       Imaging results:  CT Abdomen Pelvis Without Contrast    Result Date: 10/6/2024  Impression: 1.Abnormal soft tissue density centered in the right hemipelvis, nonspecific in appearance but suspected to largely represent fibrosis and/or post XRT changes. Other possibilities such as underlying neoplasm or infection are considered less likely, however, not definitively excluded. 2.Partial versus early complete small bowel obstruction in this region. 3.Apparent wall thickening of the cecum favored to be secondary to post XRT change although superimposed active inflammation or neoplasm cannot be definitively excluded. 4.Obstruction of the distal right ureter by the above process with resulting severe right hydronephrosis and hydroureter. Severe right renal parenchymal atrophy. 5.Previous hysterectomy 6.Mesentery and right inguinal adenopathy in the lower abdomen and pelvis could be inflammatory or neoplastic in etiology. 7.Multiple vascular collaterals noted in the anterior pelvic wall suggesting some degree of obstruction of the right external and internal iliac artery and veins. Evaluation is limited by the lack of IV contrast. Electronically  Signed: Young Blake MD  10/6/2024 11:03 AM EDT  Workstation ID: [id number]     Social issues:   Social History     Socioeconomic History    Marital status:    Tobacco Use    Smoking status: Never    Smokeless tobacco: Never   Vaping Use    Vaping status: Never Used   Substance and Sexual Activity    Alcohol use: Not Currently     Comment: may drink a little wine but only very occasionally    Drug use: No    Sexual activity: Yes     Partners: Male     Birth control/protection: None     Comment: have had hysterectomy       NIH Stroke Scale:  Interval: (not recorded)  1a. Level of Consciousness: (not recorded)  1b. LOC Questions: (not recorded)  1c. LOC Commands: (not recorded)  2. Best Gaze: (not recorded)  3. Visual: (not recorded)  4. Facial Palsy: (not recorded)  5a. Motor Arm, Left: (not recorded)  5b. Motor Arm, Right: (not recorded)  6a. Motor Leg, Left: (not recorded)  6b. Motor Leg, Right: (not recorded)  7. Limb Ataxia: (not recorded)  8. Sensory: (not recorded)  9. Best Language: (not recorded)  10. Dysarthria: (not recorded)  11. Extinction and Inattention (formerly Neglect): (not recorded)    Total (NIH Stroke Scale): (not recorded)     Additional notable assessment information:     Nursing report ED to floor:  SIPS 2 GRACIELA Benítez RN   10/06/24 13:58 EDT

## 2024-10-06 NOTE — Clinical Note
Level of Care: Med/Surg [1]   Admitting Physician: DANTE MATHUR [[id number]]   Attending Physician: DANTE MATHUR [[id number]]

## 2024-10-06 NOTE — CONSULTS
Urology Consult Note    Patient:Marilyn Pineda :1955  Room:[facility]  Admit Date10/2024  Age:68 y.o.     SEX:female     DOS:10/6/2024     MR:[id number]     Visit:[id number]       Attending: Reginald Diop MD  Referring Provider: Dr Diop  Reason for Consultation: Right hydronephrosis    Patient Care Team:  Cinthia Brown MD as PCP - General (Internal Medicine & Pediatrics)  Patrick Leblanc APRN (Obstetrics and Gynecology)    Chief complaint nausea and vomiting    Subjective .     History of present illness: 68-year-old woman with history of cervical cancer for which she had a hysterectomy and radiation in .  Patient now presents with a several day history of diarrhea followed by nausea and vomiting that developed a brown color to it.  She denies any fevers or chills.  She denies any urinary complaints.  A CT scan was performed which reveals severe right hydroureteronephrosis down to the ureterovesical junction.  This appears to be long-term as there is minimal if any residual functional right renal tissue.  There is also evidence of a distal small bowel obstruction.  Patient's creatinine is mildly elevated to 1.04 from a baseline of 0.9.  White blood cell count is elevated to 13.8.    Review of Systems  10 point review of systems were reviewed and are negative except for:  Constitution:  positive for See HPI    History  Past Medical History:   Diagnosis Date    Allergic     amoxicilin clayvix    Anemia     Arthritis 2024    In right  foot    Cancer     cervial cancer/ radiation and had hysterectomy    Pain of right hip joint 2018    Right lower lobe pneumonia 2018    Thrush, oral 2018     Past Surgical History:   Procedure Laterality Date     SECTION  1984    HAND SURGERY      HYSTERECTOMY       Social History     Socioeconomic History    Marital status:    Tobacco Use    Smoking status: Never    Smokeless tobacco: Never   Vaping Use    Vaping status:  Never Used   Substance and Sexual Activity    Alcohol use: Not Currently     Comment: may drink a little wine but only very occasionally    Drug use: No    Sexual activity: Yes     Partners: Male     Birth control/protection: None     Comment: have had hysterectomy     Family History   Problem Relation Age of Onset    COPD Mother         from smoking for years    Miscarriages / Stillbirths Mother         had a miscarriage    Thyroid disease Father         is taking thyroid pil;    Cancer Maternal Grandmother         had lung cancer     Allergy  Allergies   Allergen Reactions    Augmentin [Amoxicillin-Pot Clavulanate] Hives     Prior to Admission medications    Medication Sig Start Date End Date Taking? Authorizing Provider   BIOTIN PO Take 1 tablet by mouth Daily.   Yes Erick Mathur MD   cyanocobalamin (VITAMIN B-12) 2500 MCG tablet tablet Take 500 mcg by mouth Daily.   Yes Erick Mathur MD   Ferrous Sulfate (IRON PO) Take 65 mg by mouth Daily.   Yes Erick Mathur MD   Misc Natural Products (BEET ROOT PO) Take 500 mg by mouth Daily.   Yes Erick Mathur MD   Multiple Minerals-Vitamins (Calcium-Magnesium-Zinc-D3) tablet Take 1 tablet by mouth Daily.   Yes Erick Mathur MD   multivitamin with minerals (MULTIVITAMIN WOMEN PO) Take 1 tablet by mouth Daily.   Yes Erick Mathur MD   vitamin B-6 (PYRIDOXINE) 50 MG tablet Take 2 tablets by mouth Daily.   Yes Erick Mathur MD         Objective     tMax 24 hours:  Temp (24hrs), Av.1 °F (36.7 °C), Min:97.8 °F (36.6 °C), Max:98.3 °F (36.8 °C)    Vital Sign Ranges:  Temp:  [97.8 °F (36.6 °C)-98.3 °F (36.8 °C)] 98.3 °F (36.8 °C)  Heart Rate:  [] 76  Resp:  [16-18] 16  BP: (146-206)/() 168/100  Intake and Output Last 3 Shifts:  No intake/output data recorded.      Physical Exam:   General Appearance: alert, appears stated age, and cooperative  Head: normocephalic, without obvious abnormality and  atraumatic  Abdomen: no guarding and no rebound tenderness  Skin: no bleeding, bruising or rash  Neurologic: Mental Status orientated to person, place, time and situation    Results Review:     Lab Results (last 24 hours)       Procedure Component Value Units Date/Time    Comprehensive Metabolic Panel [[id number]]  (Abnormal) Collected: 10/06/24 0937    Specimen: Blood Updated: 10/06/24 1014     Glucose 158 mg/dL      BUN 27 mg/dL      Creatinine 1.04 mg/dL      Sodium 138 mmol/L      Potassium 4.0 mmol/L      Chloride 95 mmol/L      CO2 27.6 mmol/L      Calcium 9.4 mg/dL      Total Protein 8.6 g/dL      Albumin 4.0 g/dL      ALT (SGPT) 9 U/L      AST (SGOT) 19 U/L      Alkaline Phosphatase 87 U/L      Total Bilirubin 0.6 mg/dL      Globulin 4.6 gm/dL      A/G Ratio 0.9 g/dL      BUN/Creatinine Ratio 26.0     Anion Gap 15.4 mmol/L      eGFR 59.0 mL/min/1.73     Narrative:      GFR Normal >60  Chronic Kidney Disease <60  Kidney Failure <15      Urinalysis, Microscopic Only - Urine, Clean Catch [[id number]]  (Abnormal) Collected: 10/06/24 0948    Specimen: Urine, Clean Catch Updated: 10/06/24 1012     RBC, UA 0-2 /HPF      WBC, UA 3-5 /HPF      Bacteria, UA None Seen /HPF      Squamous Epithelial Cells, UA 13-20 /HPF      Hyaline Casts, UA 7-12 /LPF      Methodology Manual Light Microscopy    Lipase [[id number]]  (Abnormal) Collected: 10/06/24 0937    Specimen: Blood Updated: 10/06/24 1006     Lipase 11 U/L     COVID-19 and FLU A/B PCR, 1 HR TAT - Swab, Nasopharynx [[id number]]  (Normal) Collected: 10/06/24 0937    Specimen: Swab from Nasopharynx Updated: 10/06/24 1006     COVID19 Not Detected     Influenza A PCR Not Detected     Influenza B PCR Not Detected    Narrative:      Fact sheet for providers: https://www.fda.gov/media/434294/download    Fact sheet for patients: https://www.fda.gov/media/361257/download    Test performed by PCR.    Urinalysis With Microscopic If Indicated (No Culture) - Urine, Clean Catch  "[[id number]]  (Abnormal) Collected: 10/06/24 0948    Specimen: Urine, Clean Catch Updated: 10/06/24 1002     Color, UA Yellow     Appearance, UA Clear     pH, UA 7.0     Specific Gravity, UA 1.022     Glucose, UA Negative     Ketones, UA 15 mg/dL (1+)     Bilirubin, UA Negative     Blood, UA Negative     Protein, UA 30 mg/dL (1+)     Leuk Esterase, UA Small (1+)     Nitrite, UA Negative     Urobilinogen, UA 1.0 E.U./dL    CBC & Differential [[id number]]  (Abnormal) Collected: 10/06/24 0937    Specimen: Blood Updated: 10/06/24 0946    Narrative:      The following orders were created for panel order CBC & Differential.  Procedure                               Abnormality         Status                     ---------                               -----------         ------                     CBC Auto Differential[[id number]]        Abnormal            Final result                 Please view results for these tests on the individual orders.    CBC Auto Differential [[id number]]  (Abnormal) Collected: 10/06/24 0937    Specimen: Blood Updated: 10/06/24 0946     WBC 13.80 10*3/mm3      RBC 4.13 10*6/mm3      Hemoglobin 10.7 g/dL      Hematocrit 34.1 %      MCV 82.6 fL      MCH 25.9 pg      MCHC 31.4 g/dL      RDW 15.4 %      RDW-SD 46.8 fl      MPV 8.6 fL      Platelets 578 10*3/mm3      Neutrophil % 93.0 %      Lymphocyte % 3.0 %      Monocyte % 3.4 %      Eosinophil % 0.0 %      Basophil % 0.1 %      Immature Grans % 0.5 %      Neutrophils, Absolute 12.83 10*3/mm3      Lymphocytes, Absolute 0.41 10*3/mm3      Monocytes, Absolute 0.47 10*3/mm3      Eosinophils, Absolute 0.00 10*3/mm3      Basophils, Absolute 0.02 10*3/mm3      Immature Grans, Absolute 0.07 10*3/mm3      nRBC 0.0 /100 WBC            No results found for: \"URINECX\"     Imaging Results (Last 7 Days)       Procedure Component Value Units Date/Time    CT Abdomen Pelvis With Contrast [[id number]] Collected: 10/06/24 1526     Updated: 10/06/24 1546    Narrative:   "    CT CHEST W CONTRAST DIAGNOSTIC, CT ABDOMEN PELVIS W CONTRAST    Date of Exam: 10/6/2024 2:55 PM EDT    Indication: ? Neoplastic.    Comparison: CT abdomen pelvis 10/6/2024    Technique: Axial CT images were obtained of the chest after the uneventful intravenous administration of iodinated contrast.  Sagittal and coronal reconstructions were performed.  Automated exposure control and iterative reconstruction methods were used.      Findings:        Chest:    Kat/mediastinum: No adenopathy. Thoracic aorta normal in caliber. Minimal coronary calcification. No pericardial effusion    Lungs/pleura: No suspicious pulmonary nodule. Calcified granulomas on the right. No active pulmonary process. No pleural effusion or pleural tumor    Bones/soft tissues: No axillary adenopathy. No suspicious bone lesion      Abdomen/Pelvis:    Organs: 3 mm indeterminate lesion in the inferior liver (6/39). Focal fatty infiltration in the left hepatic lobe. Spleen, pancreas, adrenal glands unremarkable. Severe right-sided hydroureteronephrosis to just beyond the pelvic inlet where there is   obstruction caused by pelvic soft tissue described below. Right renal cortical thinning. No hydronephrosis on the left. Left renal cysts. Contracted gallbladder    Gastrointestinal: Dilatation of the stomach and small bowel to the pelvis there is wall thickening of approximately 5 cm of the terminal ileum, with intestinal dilatation beginning proximal to this point. There is also wall thickening of the base of the   cecum. Colon is normal in caliber. Sigmoid diverticula are noted. Several enhancing mesenteric lymph nodes are present (79, 91, 101), measuring up to 1.3 cm (91)    Pelvis: Wall thickening of the urinary bladder. Uterus surgically absent. Ill-defined soft tissue in the right side of the upper pelvis. Ill-defined soft tissue causes obstruction of the right external and internal iliac veins. Iliac artery is patent.   There is thickening of  the right iliopsoas muscle.    Peritoneum/Retroperitoneum: No ascites. No retroperitoneal adenopathy. Normal caliber aorta.    Bones/Soft Tissues: Widening of the right SI joint with erosive changes and some gas in the widened joint space. Sclerosis of the adjacent ilium and sacrum. Old pelvic bone fractures. Abdominal wall collaterals. Enlarged right groin lymph nodes up to 2.8   cm (129).      Impression:      Chest-  No evidence of metastatic disease. No acute process demonstrated    Abdomen/pelvis-  1. There is ill-defined soft tissue in the right side of the pelvis. Finding could represent treatment related changes and fibrosis. Tumor not excluded, in comparison with any outside prior imaging would be helpful with this regard. The abnormality   results in right ureteral obstruction which may be longstanding given the presence of cortical atrophy, and right iliac vein obstruction which is associated with collateral formation.  2. Distal small bowel obstruction with transition at the terminal ileum. Thickening of the terminal ileum may relate to radiation enteropathy or fibrosis. This may also account for thickening of the base of the cecum. Wall thickening of the urinary   bladder suggests radiation cystitis  3. Abnormal appearance of the right SI joint and adjacent bones. 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 The presence of infection of the SI joint is not excluded  4. Thickening of the right iliopsoas muscle may be treatment related or secondary to adjacent SI joint infection if present  5. 3 mm indeterminate liver lesion, likely cyst. Follow-up recommended  6. Mild mesenteric adenopathy, and enlarged right groin lymph nodes          Electronically Signed: Abhishek Huang    10/6/2024 3:44 PM EDT    Workstation ID: [id number]    CT Chest With Contrast Diagnostic [[id number]] Collected: 10/06/24 1526     Updated: 10/06/24 1546    Narrative:      CT CHEST W CONTRAST DIAGNOSTIC, CT ABDOMEN PELVIS W  CONTRAST    Date of Exam: 10/6/2024 2:55 PM EDT    Indication: ? Neoplastic.    Comparison: CT abdomen pelvis 10/6/2024    Technique: Axial CT images were obtained of the chest after the uneventful intravenous administration of iodinated contrast.  Sagittal and coronal reconstructions were performed.  Automated exposure control and iterative reconstruction methods were used.      Findings:        Chest:    Kat/mediastinum: No adenopathy. Thoracic aorta normal in caliber. Minimal coronary calcification. No pericardial effusion    Lungs/pleura: No suspicious pulmonary nodule. Calcified granulomas on the right. No active pulmonary process. No pleural effusion or pleural tumor    Bones/soft tissues: No axillary adenopathy. No suspicious bone lesion      Abdomen/Pelvis:    Organs: 3 mm indeterminate lesion in the inferior liver (6/39). Focal fatty infiltration in the left hepatic lobe. Spleen, pancreas, adrenal glands unremarkable. Severe right-sided hydroureteronephrosis to just beyond the pelvic inlet where there is   obstruction caused by pelvic soft tissue described below. Right renal cortical thinning. No hydronephrosis on the left. Left renal cysts. Contracted gallbladder    Gastrointestinal: Dilatation of the stomach and small bowel to the pelvis there is wall thickening of approximately 5 cm of the terminal ileum, with intestinal dilatation beginning proximal to this point. There is also wall thickening of the base of the   cecum. Colon is normal in caliber. Sigmoid diverticula are noted. Several enhancing mesenteric lymph nodes are present (79, 91, 101), measuring up to 1.3 cm (91)    Pelvis: Wall thickening of the urinary bladder. Uterus surgically absent. Ill-defined soft tissue in the right side of the upper pelvis. Ill-defined soft tissue causes obstruction of the right external and internal iliac veins. Iliac artery is patent.   There is thickening of the right iliopsoas  muscle.    Peritoneum/Retroperitoneum: No ascites. No retroperitoneal adenopathy. Normal caliber aorta.    Bones/Soft Tissues: Widening of the right SI joint with erosive changes and some gas in the widened joint space. Sclerosis of the adjacent ilium and sacrum. Old pelvic bone fractures. Abdominal wall collaterals. Enlarged right groin lymph nodes up to 2.8   cm (129).      Impression:      Chest-  No evidence of metastatic disease. No acute process demonstrated    Abdomen/pelvis-  1. There is ill-defined soft tissue in the right side of the pelvis. Finding could represent treatment related changes and fibrosis. Tumor not excluded, in comparison with any outside prior imaging would be helpful with this regard. The abnormality   results in right ureteral obstruction which may be longstanding given the presence of cortical atrophy, and right iliac vein obstruction which is associated with collateral formation.  2. Distal small bowel obstruction with transition at the terminal ileum. Thickening of the terminal ileum may relate to radiation enteropathy or fibrosis. This may also account for thickening of the base of the cecum. Wall thickening of the urinary   bladder suggests radiation cystitis  3. Abnormal appearance of the right SI joint and adjacent bones. 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 The presence of infection of the SI joint is not excluded  4. Thickening of the right iliopsoas muscle may be treatment related or secondary to adjacent SI joint infection if present  5. 3 mm indeterminate liver lesion, likely cyst. Follow-up recommended  6. Mild mesenteric adenopathy, and enlarged right groin lymph nodes          Electronically Signed: Abhishek Huang    10/6/2024 3:44 PM EDT    Workstation ID: [id number]    CT Head With & Without Contrast [[id number]] Collected: 10/06/24 1518     Updated: 10/06/24 1522    Narrative:      CT HEAD W WO CONTRAST    Date of Exam: 10/6/2024 2:55 PM EDT    Indication:  Metastasis ?.    Comparison: None available.    Technique: Axial CT images were obtained of the head before and after the uneventful intravenous administration of iodinated contrast.  Sagittal and coronal reconstructions were performed.  Automated exposure control and iterative reconstruction methods   were used.    Findings: Gray-white matter differentiation is maintained without evidence of an acute infarction. No intracranial mass or mass effect. No extra-axial mass or collection. The ventricles and sulci are normal in size and configuration. The posterior fossa   appears normal. Sellar and suprasellar structures are normal. No abnormal intracranial enhancement.    Orbital and paranasal soft tissues are normal. The paranasal sinuses, ethmoid air cells, and mastoid air cells are aerated. The bony calvarium appears intact. No acute fractures. No lytic or blastic bony diseases. Right occipital benign osteoma.      Impression:      Impression: No acute intracranial pathology. No abnormal intracranial enhancement.          Electronically Signed: Alejandro Baugh MD    10/6/2024 3:20 PM EDT    Workstation ID: [id number]    CT Abdomen Pelvis Without Contrast [[id number]] Collected: 10/06/24 1043     Updated: 10/06/24 1105    Narrative:      CT ABDOMEN PELVIS WO CONTRAST    Date of Exam: 10/6/2024 10:04 AM EDT    Indication: Abdominal pain some vomiting and diarrhea.    Comparison: None available    Technique: Axial CT images were obtained of the abdomen and pelvis without the administration of contrast. Sagittal and coronal reconstructions were performed.  Automated exposure control and iterative reconstruction methods were used.      Findings:  The lung bases are clear bilaterally. Fluid is noted in the distal thoracic esophagus suggesting reflux.    The liver, spleen, and pancreas appear unremarkable. The adrenal glands appear normal bilaterally. The left kidney appears normal.     There is severe right renal parenchymal  atrophy. There is severe right hydronephrosis and hydroureter down to the mid pelvic level. A hysterectomy has been performed. There are infiltrative changes throughout the pelvis, more prominent on the right.   There is conglomerate soft tissue density in the right hemipelvis measuring approximately 7.1 cm x 3.7 cm favored to at least in part represent fibrosis/post XRT change. There are prominent mesenteric lymph nodes noted in the lower abdomen and upper   pelvis measuring up to 1.1 cm in short axis. Right inguinal adenopathy measures up to 1.1 cm in short axis. A small amount of free fluid is noted in the pelvis. The urinary bladder is largely collapsed and not well evaluated. There is apparent wall   thickening of the urinary bladder. Colonic diverticulosis is noted. There is apparent wall thickening of the cecum. The cecum and terminal ileum lie in the region of the aforementioned conglomerate soft tissue density and are difficult to distinguish   from it. More proximally, there is dilatation of the small bowel suggesting small bowel obstruction. Small bowel loops measure up to 3.5 cm in diameter. No pneumoperitoneum is identified.    Prominent vessels are noted in the anterior lower abdominal and pelvic wall most consistent with collaterals. Portions of the right external and internal iliac artery and vein are surrounded by the conglomerate density previously noted in the right   hemipelvis and are difficult to further evaluate without intravenous contrast.    There is posttraumatic deformity of the bilateral pubic bones and right SI joint which is abnormally widened, irregular and sclerotic. Sclerosis is noted elsewhere in the sacrum and posterior right iliac bone.          Impression:      Impression:  1.Abnormal soft tissue density centered in the right hemipelvis, nonspecific in appearance but suspected to largely represent fibrosis and/or post XRT changes. Other possibilities such as underlying neoplasm  or infection are considered less likely,   however, not definitively excluded.  2.Partial versus early complete small bowel obstruction in this region.  3.Apparent wall thickening of the cecum favored to be secondary to post XRT change although superimposed active inflammation or neoplasm cannot be definitively excluded.  4.Obstruction of the distal right ureter by the above process with resulting severe right hydronephrosis and hydroureter. Severe right renal parenchymal atrophy.  5.Previous hysterectomy  6.Mesentery and right inguinal adenopathy in the lower abdomen and pelvis could be inflammatory or neoplastic in etiology.  7.Multiple vascular collaterals noted in the anterior pelvic wall suggesting some degree of obstruction of the right external and internal iliac artery and veins. Evaluation is limited by the lack of IV contrast.            Electronically Signed: Young Blake MD    10/6/2024 11:03 AM EDT    Workstation ID: [id number]            Inpatient Meds:   Scheduled Meds:pantoprazole, 40 mg, Intravenous, BID AC  sodium chloride, 10 mL, Intravenous, Q12H       Continuous Infusions:sodium chloride, 75 mL/hr, Last Rate: 75 mL/hr (10/06/24 1545)       PRN Meds:.  acetaminophen **OR** acetaminophen **OR** acetaminophen    aluminum-magnesium hydroxide-simethicone    Calcium Replacement - Follow Nurse / BPA Driven Protocol    dextrose    dextrose    glucagon (human recombinant)    hydrALAZINE    Magnesium Standard Dose Replacement - Follow Nurse / BPA Driven Protocol    melatonin    nitroglycerin    ondansetron ODT **OR** ondansetron    Phosphorus Replacement - Follow Nurse / BPA Driven Protocol    Potassium Replacement - Follow Nurse / BPA Driven Protocol    prochlorperazine    [COMPLETED] Insert Peripheral IV **AND** sodium chloride    sodium chloride      Assessment & Plan     Principal Problem:    SBO (small bowel obstruction)    Right hydronephrosis which appears chronic and is likely been there for years.   This would not cause any of the swelling of her leg and is unlikely to be causing any of her GI symptoms.  Missing a stent for drainage of the collecting system would not be any significant benefit as there is minimal if any residual tissue that could be functional.  The only reason to consider placing a stent would be if there was some thought this obstruction is causing her nausea which is unlikely given that it has been longstanding and she just now started having the symptoms.    Plan  I will follow along with you for short period of time to ensure that the patient is getting a better without placement of a stent.      I discussed the patient's findings and my recommendations with patient and family    Thank you for this  consult    Dedrick Valerio MD  10/06/24  17:33 EDT

## 2024-10-07 ENCOUNTER — APPOINTMENT (OUTPATIENT)
Dept: GENERAL RADIOLOGY | Facility: HOSPITAL | Age: 69
DRG: 988 | End: 2024-10-07
Payer: MEDICARE

## 2024-10-07 ENCOUNTER — INPATIENT HOSPITAL (OUTPATIENT)
Age: 69
End: 2024-10-07
Payer: MEDICARE

## 2024-10-07 ENCOUNTER — INPATIENT HOSPITAL (OUTPATIENT)
Dept: URBAN - METROPOLITAN AREA HOSPITAL 84 | Facility: HOSPITAL | Age: 69
End: 2024-10-07
Payer: MEDICARE

## 2024-10-07 DIAGNOSIS — D72.829 ELEVATED WHITE BLOOD CELL COUNT, UNSPECIFIED: ICD-10-CM

## 2024-10-07 DIAGNOSIS — R19.7 DIARRHEA, UNSPECIFIED: ICD-10-CM

## 2024-10-07 DIAGNOSIS — D50.9 IRON DEFICIENCY ANEMIA, UNSPECIFIED: ICD-10-CM

## 2024-10-07 DIAGNOSIS — R93.3 ABNORMAL FINDINGS ON DIAGNOSTIC IMAGING OF OTHER PARTS OF DI: ICD-10-CM

## 2024-10-07 DIAGNOSIS — K56.600 PARTIAL INTESTINAL OBSTRUCTION, UNSPECIFIED AS TO CAUSE: ICD-10-CM

## 2024-10-07 DIAGNOSIS — R11.2 NAUSEA WITH VOMITING, UNSPECIFIED: ICD-10-CM

## 2024-10-07 LAB
ANION GAP SERPL CALCULATED.3IONS-SCNC: 10.2 MMOL/L (ref 5–15)
BASOPHILS # BLD AUTO: 0.04 10*3/MM3 (ref 0–0.2)
BASOPHILS # BLD AUTO: 0.06 10*3/MM3 (ref 0–0.2)
BASOPHILS NFR BLD AUTO: 0.2 % (ref 0–1.5)
BASOPHILS NFR BLD AUTO: 0.5 % (ref 0–1.5)
BUN SERPL-MCNC: 31 MG/DL (ref 8–23)
BUN/CREAT SERPL: 33.3 (ref 7–25)
CALCIUM SPEC-SCNC: 8.9 MG/DL (ref 8.6–10.5)
CHLORIDE SERPL-SCNC: 102 MMOL/L (ref 98–107)
CO2 SERPL-SCNC: 27.8 MMOL/L (ref 22–29)
CREAT SERPL-MCNC: 0.93 MG/DL (ref 0.57–1)
DEPRECATED RDW RBC AUTO: 48.2 FL (ref 37–54)
DEPRECATED RDW RBC AUTO: 49.1 FL (ref 37–54)
EGFRCR SERPLBLD CKD-EPI 2021: 67.5 ML/MIN/1.73
EOSINOPHIL # BLD AUTO: 0.11 10*3/MM3 (ref 0–0.4)
EOSINOPHIL # BLD AUTO: 0.19 10*3/MM3 (ref 0–0.4)
EOSINOPHIL NFR BLD AUTO: 0.7 % (ref 0.3–6.2)
EOSINOPHIL NFR BLD AUTO: 1.5 % (ref 0.3–6.2)
ERYTHROCYTE [DISTWIDTH] IN BLOOD BY AUTOMATED COUNT: 15.6 % (ref 12.3–15.4)
ERYTHROCYTE [DISTWIDTH] IN BLOOD BY AUTOMATED COUNT: 15.7 % (ref 12.3–15.4)
GLUCOSE BLDC GLUCOMTR-MCNC: 105 MG/DL (ref 70–105)
GLUCOSE BLDC GLUCOMTR-MCNC: 120 MG/DL (ref 70–105)
GLUCOSE BLDC GLUCOMTR-MCNC: 127 MG/DL (ref 70–105)
GLUCOSE BLDC GLUCOMTR-MCNC: 127 MG/DL (ref 70–105)
GLUCOSE BLDC GLUCOMTR-MCNC: 190 MG/DL (ref 70–105)
GLUCOSE BLDC GLUCOMTR-MCNC: 68 MG/DL (ref 70–105)
GLUCOSE SERPL-MCNC: 176 MG/DL (ref 65–99)
HCT VFR BLD AUTO: 31.4 % (ref 34–46.6)
HCT VFR BLD AUTO: 34.1 % (ref 34–46.6)
HGB BLD-MCNC: 10.4 G/DL (ref 12–15.9)
HGB BLD-MCNC: 9.7 G/DL (ref 12–15.9)
IMM GRANULOCYTES # BLD AUTO: 0.13 10*3/MM3 (ref 0–0.05)
IMM GRANULOCYTES # BLD AUTO: 0.14 10*3/MM3 (ref 0–0.05)
IMM GRANULOCYTES NFR BLD AUTO: 0.8 % (ref 0–0.5)
IMM GRANULOCYTES NFR BLD AUTO: 1.1 % (ref 0–0.5)
LYMPHOCYTES # BLD AUTO: 0.89 10*3/MM3 (ref 0.7–3.1)
LYMPHOCYTES # BLD AUTO: 1.35 10*3/MM3 (ref 0.7–3.1)
LYMPHOCYTES NFR BLD AUTO: 10.9 % (ref 19.6–45.3)
LYMPHOCYTES NFR BLD AUTO: 5.5 % (ref 19.6–45.3)
MAGNESIUM SERPL-MCNC: 2.7 MG/DL (ref 1.6–2.4)
MCH RBC QN AUTO: 26.1 PG (ref 26.6–33)
MCH RBC QN AUTO: 26.2 PG (ref 26.6–33)
MCHC RBC AUTO-ENTMCNC: 30.5 G/DL (ref 31.5–35.7)
MCHC RBC AUTO-ENTMCNC: 30.9 G/DL (ref 31.5–35.7)
MCV RBC AUTO: 84.9 FL (ref 79–97)
MCV RBC AUTO: 85.5 FL (ref 79–97)
MONOCYTES # BLD AUTO: 0.53 10*3/MM3 (ref 0.1–0.9)
MONOCYTES # BLD AUTO: 1.01 10*3/MM3 (ref 0.1–0.9)
MONOCYTES NFR BLD AUTO: 4.3 % (ref 5–12)
MONOCYTES NFR BLD AUTO: 6.2 % (ref 5–12)
NEUTROPHILS NFR BLD AUTO: 10.06 10*3/MM3 (ref 1.7–7)
NEUTROPHILS NFR BLD AUTO: 14.03 10*3/MM3 (ref 1.7–7)
NEUTROPHILS NFR BLD AUTO: 81.7 % (ref 42.7–76)
NEUTROPHILS NFR BLD AUTO: 86.6 % (ref 42.7–76)
NRBC BLD AUTO-RTO: 0 /100 WBC (ref 0–0.2)
NRBC BLD AUTO-RTO: 0 /100 WBC (ref 0–0.2)
PHOSPHATE SERPL-MCNC: 5.3 MG/DL (ref 2.5–4.5)
PLATELET # BLD AUTO: 515 10*3/MM3 (ref 140–450)
PLATELET # BLD AUTO: 582 10*3/MM3 (ref 140–450)
PMV BLD AUTO: 8.4 FL (ref 6–12)
PMV BLD AUTO: 8.5 FL (ref 6–12)
POTASSIUM SERPL-SCNC: 4.1 MMOL/L (ref 3.5–5.2)
RBC # BLD AUTO: 3.7 10*6/MM3 (ref 3.77–5.28)
RBC # BLD AUTO: 3.99 10*6/MM3 (ref 3.77–5.28)
SODIUM SERPL-SCNC: 140 MMOL/L (ref 136–145)
WBC NRBC COR # BLD AUTO: 12.33 10*3/MM3 (ref 3.4–10.8)
WBC NRBC COR # BLD AUTO: 16.21 10*3/MM3 (ref 3.4–10.8)

## 2024-10-07 PROCEDURE — 80048 BASIC METABOLIC PNL TOTAL CA: CPT | Performed by: INTERNAL MEDICINE

## 2024-10-07 PROCEDURE — 82948 REAGENT STRIP/BLOOD GLUCOSE: CPT

## 2024-10-07 PROCEDURE — 99222 1ST HOSP IP/OBS MODERATE 55: CPT | Mod: FS | Performed by: NURSE PRACTITIONER

## 2024-10-07 PROCEDURE — 25810000003 SODIUM CHLORIDE 0.9 % SOLUTION: Performed by: INTERNAL MEDICINE

## 2024-10-07 PROCEDURE — 74250 X-RAY XM SM INT 1CNTRST STD: CPT

## 2024-10-07 PROCEDURE — 85025 COMPLETE CBC W/AUTO DIFF WBC: CPT | Performed by: INTERNAL MEDICINE

## 2024-10-07 PROCEDURE — 84100 ASSAY OF PHOSPHORUS: CPT | Performed by: INTERNAL MEDICINE

## 2024-10-07 PROCEDURE — 83735 ASSAY OF MAGNESIUM: CPT | Performed by: INTERNAL MEDICINE

## 2024-10-07 PROCEDURE — 25010000002 ONDANSETRON PER 1 MG: Performed by: INTERNAL MEDICINE

## 2024-10-07 PROCEDURE — 25010000002 HYDRALAZINE PER 20 MG: Performed by: INTERNAL MEDICINE

## 2024-10-07 PROCEDURE — 25010000002 PROCHLORPERAZINE 10 MG/2ML SOLUTION: Performed by: INTERNAL MEDICINE

## 2024-10-07 PROCEDURE — 99222 1ST HOSP IP/OBS MODERATE 55: CPT | Performed by: SURGERY

## 2024-10-07 PROCEDURE — 99222 1ST HOSP IP/OBS MODERATE 55: CPT | Performed by: STUDENT IN AN ORGANIZED HEALTH CARE EDUCATION/TRAINING PROGRAM

## 2024-10-07 PROCEDURE — 25510000001 IOPAMIDOL PER 1 ML: Performed by: STUDENT IN AN ORGANIZED HEALTH CARE EDUCATION/TRAINING PROGRAM

## 2024-10-07 RX ORDER — IOPAMIDOL 755 MG/ML
200 INJECTION, SOLUTION INTRAVASCULAR
Status: COMPLETED | OUTPATIENT
Start: 2024-10-07 | End: 2024-10-07

## 2024-10-07 RX ADMIN — Medication 10 ML: at 21:44

## 2024-10-07 RX ADMIN — Medication 10 ML: at 10:25

## 2024-10-07 RX ADMIN — ONDANSETRON 4 MG: 2 INJECTION, SOLUTION INTRAMUSCULAR; INTRAVENOUS at 23:35

## 2024-10-07 RX ADMIN — IOPAMIDOL 200 ML: 755 INJECTION, SOLUTION INTRAVENOUS at 12:15

## 2024-10-07 RX ADMIN — SODIUM CHLORIDE 75 ML/HR: 9 INJECTION, SOLUTION INTRAVENOUS at 21:46

## 2024-10-07 RX ADMIN — DEXTROSE MONOHYDRATE 25 G: 25 INJECTION, SOLUTION INTRAVENOUS at 18:04

## 2024-10-07 RX ADMIN — PROCHLORPERAZINE EDISYLATE 2.5 MG: 5 INJECTION INTRAMUSCULAR; INTRAVENOUS at 06:16

## 2024-10-07 RX ADMIN — PANTOPRAZOLE SODIUM 40 MG: 40 INJECTION, POWDER, FOR SOLUTION INTRAVENOUS at 17:13

## 2024-10-07 RX ADMIN — PANTOPRAZOLE SODIUM 40 MG: 40 INJECTION, POWDER, FOR SOLUTION INTRAVENOUS at 07:34

## 2024-10-07 RX ADMIN — HYDRALAZINE HYDROCHLORIDE 10 MG: 20 INJECTION INTRAMUSCULAR; INTRAVENOUS at 21:44

## 2024-10-07 NOTE — PLAN OF CARE
Goal Outcome Evaluation:  Patient up adlib to bathroom, did have small amount of emesis, given prn Compazine with positive relief, no complaints of pain voiced, also had moderate size bm stated was loose with small solid pieces, overall feeling better.

## 2024-10-07 NOTE — PLAN OF CARE
Goal Outcome Evaluation:  Plan of Care Reviewed With: patient                Pt A&O x4. Pt able to make needs known. No complaints of nausea or pain. Pt up ad marie, family remains at bedside. Call light within reach. Plan of care ongoing.

## 2024-10-07 NOTE — CONSULTS
Hematology/Oncology Inpatient Consultation    Patient name: Marilyn Pineda  : 1955  MRN: [id number]  Referring Provider: Dr. Diop  Reason for Consultation: Abdominal mass    Chief complaint: Nausea and vomiting    History of present illness:    Marilyn Pineda is a 68 y.o. female who presented to [facility] on 10/6/2024 with complaints of nausea and vomiting, diarrhea for several days. She denies fever or chills.     10/6/2024  CT abdomen pelvis without contrast  Impression:  1.Abnormal soft tissue density centered in the right hemipelvis, nonspecific in appearance but suspected to largely represent fibrosis and/or post XRT changes. Other possibilities such as underlying neoplasm or infection are considered less likely,   however, not definitively excluded.  2.Partial versus early complete small bowel obstruction in this region.  3.Apparent wall thickening of the cecum favored to be secondary to post XRT change although superimposed active inflammation or neoplasm cannot be definitively excluded.  4.Obstruction of the distal right ureter by the above process with resulting severe right hydronephrosis and hydroureter. Severe right renal parenchymal atrophy.  5.Previous hysterectomy  6.Mesentery and right inguinal adenopathy in the lower abdomen and pelvis could be inflammatory or neoplastic in etiology.  7.Multiple vascular collaterals noted in the anterior pelvic wall suggesting some degree of obstruction of the right external and internal iliac artery and veins. Evaluation is limited by the lack of IV contrast.       10/6/2024 CT chest abdomen pelvis with contrast  IMPRESSION:  Chest-  No evidence of metastatic disease. No acute process demonstrated     Abdomen/pelvis-  1. There is ill-defined soft tissue in the right side of the pelvis. Finding could represent treatment related changes and fibrosis. Tumor not excluded, in comparison with any outside prior imaging would be helpful with this  regard. The abnormality   results in right ureteral obstruction which may be longstanding given the presence of cortical atrophy, and right iliac vein obstruction which is associated with collateral formation.  2. Distal small bowel obstruction with transition at the terminal ileum. Thickening of the terminal ileum may relate to radiation enteropathy or fibrosis. This may also account for thickening of the base of the cecum. Wall thickening of the urinary   bladder suggests radiation cystitis  3. Abnormal appearance of the right SI joint and adjacent bones. 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 The presence of infection of the SI joint is not excluded  4. Thickening of the right iliopsoas muscle may be treatment related or secondary to adjacent SI joint infection if present  5. 3 mm indeterminate liver lesion, likely cyst. Follow-up recommended  6. Mild mesenteric adenopathy, and enlarged right groin lymph nodes    10/6/2024 CT head w/wo contrast  Impression: No acute intracranial pathology. No abnormal intracranial enhancement.     Urology has been consulted. They believe hydronephrosis is chronic and unlikely to be the source of her nausea.  GI and general surgery have also been consulted.    10/07/24  Hematology/Oncology was consulted.  Patient reports a remote history of cervical cancer diagnosed in 1993 status post hysterectomy.  Of note, she did have a positive Cologuard screening in June 2024 and is scheduled for outpatient colonoscopy.    He/She  has a past medical history of Allergic, Anemia, Arthritis (07/17/2024), Cancer (1993), Pain of right hip joint (01/09/2018), Right lower lobe pneumonia (01/09/2018), and Thrush, oral (01/18/2018).    PCP: Cinthia Brown MD    INTERVAL HISTORY: patient seen today for initial evaluation. Noted to be comfortable. Denied any acute complaints. No abdominal pain at this time. Nausea/vomiting has improved.    History:  Past Medical History:    Diagnosis Date    Allergic     amoxicilin clayvix    Anemia     Arthritis 2024    In right  foot    Cancer     cervial cancer/ radiation and had hysterectomy    Pain of right hip joint 2018    Right lower lobe pneumonia 2018    Thrush, oral 2018   ,   Past Surgical History:   Procedure Laterality Date     SECTION  1984    HAND SURGERY      HYSTERECTOMY     ,   Family History   Problem Relation Age of Onset    COPD Mother         from smoking for years    Miscarriages / Stillbirths Mother         had a miscarriage    Thyroid disease Father         is taking thyroid pil;    Cancer Maternal Grandmother         had lung cancer   ,   Social History     Tobacco Use    Smoking status: Never    Smokeless tobacco: Never   Vaping Use    Vaping status: Never Used   Substance Use Topics    Alcohol use: Not Currently     Comment: may drink a little wine but only very occasionally    Drug use: No   ,   Medications Prior to Admission   Medication Sig Dispense Refill Last Dose    BIOTIN PO Take 1 tablet by mouth Daily.       cyanocobalamin (VITAMIN B-12) 2500 MCG tablet tablet Take 500 mcg by mouth Daily.       Ferrous Sulfate (IRON PO) Take 65 mg by mouth Daily.       Misc Natural Products (BEET ROOT PO) Take 500 mg by mouth Daily.       Multiple Minerals-Vitamins (Calcium-Magnesium-Zinc-D3) tablet Take 1 tablet by mouth Daily.       multivitamin with minerals (MULTIVITAMIN WOMEN PO) Take 1 tablet by mouth Daily.       vitamin B-6 (PYRIDOXINE) 50 MG tablet Take 2 tablets by mouth Daily.      , Scheduled Meds:  pantoprazole, 40 mg, Intravenous, BID AC  sodium chloride, 10 mL, Intravenous, Q12H    , Continuous Infusions:  sodium chloride, 75 mL/hr, Last Rate: 75 mL/hr (10/06/24 1545)    , PRN Meds:    acetaminophen **OR** acetaminophen **OR** acetaminophen    aluminum-magnesium hydroxide-simethicone    Calcium Replacement - Follow Nurse / BPA Driven Protocol    dextrose    dextrose     "glucagon (human recombinant)    hydrALAZINE    Magnesium Standard Dose Replacement - Follow Nurse / BPA Driven Protocol    melatonin    nitroglycerin    ondansetron ODT **OR** ondansetron    Phosphorus Replacement - Follow Nurse / BPA Driven Protocol    Potassium Replacement - Follow Nurse / BPA Driven Protocol    prochlorperazine    [COMPLETED] Insert Peripheral IV **AND** sodium chloride    sodium chloride   Allergies:  Augmentin [amoxicillin-pot clavulanate]    Subjective     ROS:  Review of Systems   Constitutional: Negative.    HENT: Negative.     Eyes: Negative.    Respiratory: Negative.     Cardiovascular: Negative.    Gastrointestinal: Negative.    Endocrine: Negative.    Genitourinary: Negative.    Musculoskeletal: Negative.    Skin: Negative.    Allergic/Immunologic: Negative.    Neurological: Negative.    Hematological: Negative.  Positive for adenopathy (Right groin adenopathy).   Psychiatric/Behavioral: Negative.          Objective   Vital Signs:   /87 (BP Location: Left arm, Patient Position: Lying)   Pulse 74   Temp 98 °F (36.7 °C) (Oral)   Resp 18   Ht 165.1 cm (65\")   Wt 87 kg (191 lb 12.8 oz)   SpO2 98%   BMI 31.92 kg/m²     Physical Exam: (performed by MD)  Physical Exam  Constitutional:       Appearance: Normal appearance. She is normal weight.   HENT:      Head: Normocephalic and atraumatic.      Right Ear: External ear normal.      Left Ear: External ear normal.      Nose: Nose normal.      Mouth/Throat:      Mouth: Mucous membranes are moist.      Pharynx: Oropharynx is clear.   Eyes:      Extraocular Movements: Extraocular movements intact.      Conjunctiva/sclera: Conjunctivae normal.      Pupils: Pupils are equal, round, and reactive to light.   Cardiovascular:      Rate and Rhythm: Normal rate.      Pulses: Normal pulses.   Pulmonary:      Effort: Pulmonary effort is normal.   Abdominal:      General: Abdomen is flat.      Palpations: Abdomen is soft.   Musculoskeletal:        "  General: Normal range of motion.      Cervical back: Normal range of motion and neck supple.   Skin:     General: Skin is warm.   Neurological:      Mental Status: She is alert.   Psychiatric:         Mood and Affect: Mood normal.         Behavior: Behavior normal.         Thought Content: Thought content normal.         Judgment: Judgment normal.         Results Review:  Lab Results (last 48 hours)       Procedure Component Value Units Date/Time    POC Glucose Once [[id number]]  (Abnormal) Collected: 10/07/24 0546    Specimen: Blood Updated: 10/07/24 0548     Glucose 127 mg/dL      Comment: Serial Number: [id number]:  [id number]       Basic Metabolic Panel [[id number]]  (Abnormal) Collected: 10/07/24 0040    Specimen: Blood from Arm, Left Updated: 10/07/24 0114     Glucose 176 mg/dL      BUN 31 mg/dL      Creatinine 0.93 mg/dL      Sodium 140 mmol/L      Potassium 4.1 mmol/L      Chloride 102 mmol/L      CO2 27.8 mmol/L      Calcium 8.9 mg/dL      BUN/Creatinine Ratio 33.3     Anion Gap 10.2 mmol/L      eGFR 67.5 mL/min/1.73     Narrative:      GFR Normal >60  Chronic Kidney Disease <60  Kidney Failure <15      Magnesium [[id number]]  (Abnormal) Collected: 10/07/24 0040    Specimen: Blood from Arm, Left Updated: 10/07/24 0114     Magnesium 2.7 mg/dL     Phosphorus [[id number]]  (Abnormal) Collected: 10/07/24 0040    Specimen: Blood from Arm, Left Updated: 10/07/24 0114     Phosphorus 5.3 mg/dL     CBC & Differential [[id number]]  (Abnormal) Collected: 10/07/24 0040    Specimen: Blood from Arm, Left Updated: 10/07/24 0049    Narrative:      The following orders were created for panel order CBC & Differential.  Procedure                               Abnormality         Status                     ---------                               -----------         ------                     CBC Auto Differential[[id number]]        Abnormal            Final result                 Please view results for these tests on the  individual orders.    CBC Auto Differential [[id number]]  (Abnormal) Collected: 10/07/24 0040    Specimen: Blood from Arm, Left Updated: 10/07/24 0049     WBC 16.21 10*3/mm3      RBC 3.70 10*6/mm3      Hemoglobin 9.7 g/dL      Hematocrit 31.4 %      MCV 84.9 fL      MCH 26.2 pg      MCHC 30.9 g/dL      RDW 15.7 %      RDW-SD 48.2 fl      MPV 8.5 fL      Platelets 515 10*3/mm3      Neutrophil % 86.6 %      Lymphocyte % 5.5 %      Monocyte % 6.2 %      Eosinophil % 0.7 %      Basophil % 0.2 %      Immature Grans % 0.8 %      Neutrophils, Absolute 14.03 10*3/mm3      Lymphocytes, Absolute 0.89 10*3/mm3      Monocytes, Absolute 1.01 10*3/mm3      Eosinophils, Absolute 0.11 10*3/mm3      Basophils, Absolute 0.04 10*3/mm3      Immature Grans, Absolute 0.13 10*3/mm3      nRBC 0.0 /100 WBC     POC Glucose Once [[id number]]  (Abnormal) Collected: 10/07/24 0039    Specimen: Blood Updated: 10/07/24 0041     Glucose 127 mg/dL      Comment: Serial Number: [id number]:  [id number]       POC Glucose Once [[id number]]  (Abnormal) Collected: 10/06/24 1757    Specimen: Blood Updated: 10/06/24 1758     Glucose 117 mg/dL      Comment: Serial Number: [id number]:  [id number]       Comprehensive Metabolic Panel [[id number]]  (Abnormal) Collected: 10/06/24 0937    Specimen: Blood Updated: 10/06/24 1014     Glucose 158 mg/dL      BUN 27 mg/dL      Creatinine 1.04 mg/dL      Sodium 138 mmol/L      Potassium 4.0 mmol/L      Chloride 95 mmol/L      CO2 27.6 mmol/L      Calcium 9.4 mg/dL      Total Protein 8.6 g/dL      Albumin 4.0 g/dL      ALT (SGPT) 9 U/L      AST (SGOT) 19 U/L      Alkaline Phosphatase 87 U/L      Total Bilirubin 0.6 mg/dL      Globulin 4.6 gm/dL      A/G Ratio 0.9 g/dL      BUN/Creatinine Ratio 26.0     Anion Gap 15.4 mmol/L      eGFR 59.0 mL/min/1.73     Narrative:      GFR Normal >60  Chronic Kidney Disease <60  Kidney Failure <15      Urinalysis, Microscopic Only - Urine, Clean Catch [[id number]]  (Abnormal)  Collected: 10/06/24 0948    Specimen: Urine, Clean Catch Updated: 10/06/24 1012     RBC, UA 0-2 /HPF      WBC, UA 3-5 /HPF      Bacteria, UA None Seen /HPF      Squamous Epithelial Cells, UA 13-20 /HPF      Hyaline Casts, UA 7-12 /LPF      Methodology Manual Light Microscopy    Lipase [[id number]]  (Abnormal) Collected: 10/06/24 0937    Specimen: Blood Updated: 10/06/24 1006     Lipase 11 U/L     COVID-19 and FLU A/B PCR, 1 HR TAT - Swab, Nasopharynx [[id number]]  (Normal) Collected: 10/06/24 0937    Specimen: Swab from Nasopharynx Updated: 10/06/24 1006     COVID19 Not Detected     Influenza A PCR Not Detected     Influenza B PCR Not Detected    Narrative:      Fact sheet for providers: https://www.fda.gov/media/710469/download    Fact sheet for patients: https://www.fda.gov/media/396789/download    Test performed by PCR.    Urinalysis With Microscopic If Indicated (No Culture) - Urine, Clean Catch [[id number]]  (Abnormal) Collected: 10/06/24 0948    Specimen: Urine, Clean Catch Updated: 10/06/24 1002     Color, UA Yellow     Appearance, UA Clear     pH, UA 7.0     Specific Gravity, UA 1.022     Glucose, UA Negative     Ketones, UA 15 mg/dL (1+)     Bilirubin, UA Negative     Blood, UA Negative     Protein, UA 30 mg/dL (1+)     Leuk Esterase, UA Small (1+)     Nitrite, UA Negative     Urobilinogen, UA 1.0 E.U./dL    CBC & Differential [[id number]]  (Abnormal) Collected: 10/06/24 0937    Specimen: Blood Updated: 10/06/24 0946    Narrative:      The following orders were created for panel order CBC & Differential.  Procedure                               Abnormality         Status                     ---------                               -----------         ------                     CBC Auto Differential[[id number]]        Abnormal            Final result                 Please view results for these tests on the individual orders.    CBC Auto Differential [[id number]]  (Abnormal) Collected: 10/06/24 0937    Specimen:  Blood Updated: 10/06/24 0946     WBC 13.80 10*3/mm3      RBC 4.13 10*6/mm3      Hemoglobin 10.7 g/dL      Hematocrit 34.1 %      MCV 82.6 fL      MCH 25.9 pg      MCHC 31.4 g/dL      RDW 15.4 %      RDW-SD 46.8 fl      MPV 8.6 fL      Platelets 578 10*3/mm3      Neutrophil % 93.0 %      Lymphocyte % 3.0 %      Monocyte % 3.4 %      Eosinophil % 0.0 %      Basophil % 0.1 %      Immature Grans % 0.5 %      Neutrophils, Absolute 12.83 10*3/mm3      Lymphocytes, Absolute 0.41 10*3/mm3      Monocytes, Absolute 0.47 10*3/mm3      Eosinophils, Absolute 0.00 10*3/mm3      Basophils, Absolute 0.02 10*3/mm3      Immature Grans, Absolute 0.07 10*3/mm3      nRBC 0.0 /100 WBC              Pending Results:     Imaging Reviewed:   CT Abdomen Pelvis With Contrast    Result Date: 10/6/2024  Chest- No evidence of metastatic disease. No acute process demonstrated Abdomen/pelvis- 1. There is ill-defined soft tissue in the right side of the pelvis. Finding could represent treatment related changes and fibrosis. Tumor not excluded, in comparison with any outside prior imaging would be helpful with this regard. The abnormality results in right ureteral obstruction which may be longstanding given the presence of cortical atrophy, and right iliac vein obstruction which is associated with collateral formation. 2. Distal small bowel obstruction with transition at the terminal ileum. Thickening of the terminal ileum may relate to radiation enteropathy or fibrosis. This may also account for thickening of the base of the cecum. Wall thickening of the urinary bladder suggests radiation cystitis 3. Abnormal appearance of the right SI joint and adjacent bones. 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 The presence of infection of the SI joint is not excluded 4. Thickening of the right iliopsoas muscle may be treatment related or secondary to adjacent SI joint infection if present 5. 3 mm indeterminate liver lesion, likely cyst. Follow-up  recommended 6. Mild mesenteric adenopathy, and enlarged right groin lymph nodes Electronically Signed: Abhishek Huang  10/6/2024 3:44 PM EDT  Workstation ID: [id number]    CT Chest With Contrast Diagnostic    Result Date: 10/6/2024  Chest- No evidence of metastatic disease. No acute process demonstrated Abdomen/pelvis- 1. There is ill-defined soft tissue in the right side of the pelvis. Finding could represent treatment related changes and fibrosis. Tumor not excluded, in comparison with any outside prior imaging would be helpful with this regard. The abnormality results in right ureteral obstruction which may be longstanding given the presence of cortical atrophy, and right iliac vein obstruction which is associated with collateral formation. 2. Distal small bowel obstruction with transition at the terminal ileum. Thickening of the terminal ileum may relate to radiation enteropathy or fibrosis. This may also account for thickening of the base of the cecum. Wall thickening of the urinary bladder suggests radiation cystitis 3. Abnormal appearance of the right SI joint and adjacent bones. 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 The presence of infection of the SI joint is not excluded 4. Thickening of the right iliopsoas muscle may be treatment related or secondary to adjacent SI joint infection if present 5. 3 mm indeterminate liver lesion, likely cyst. Follow-up recommended 6. Mild mesenteric adenopathy, and enlarged right groin lymph nodes Electronically Signed: Abhishek Huang  10/6/2024 3:44 PM EDT  Workstation ID: [id number]    CT Head With & Without Contrast    Result Date: 10/6/2024  Impression: No acute intracranial pathology. No abnormal intracranial enhancement. Electronically Signed: Alejandro Baugh MD  10/6/2024 3:20 PM EDT  Workstation ID: [id number]    CT Abdomen Pelvis Without Contrast    Result Date: 10/6/2024  Impression: 1.Abnormal soft tissue density centered in the right hemipelvis, nonspecific in  appearance but suspected to largely represent fibrosis and/or post XRT changes. Other possibilities such as underlying neoplasm or infection are considered less likely, however, not definitively excluded. 2.Partial versus early complete small bowel obstruction in this region. 3.Apparent wall thickening of the cecum favored to be secondary to post XRT change although superimposed active inflammation or neoplasm cannot be definitively excluded. 4.Obstruction of the distal right ureter by the above process with resulting severe right hydronephrosis and hydroureter. Severe right renal parenchymal atrophy. 5.Previous hysterectomy 6.Mesentery and right inguinal adenopathy in the lower abdomen and pelvis could be inflammatory or neoplastic in etiology. 7.Multiple vascular collaterals noted in the anterior pelvic wall suggesting some degree of obstruction of the right external and internal iliac artery and veins. Evaluation is limited by the lack of IV contrast. Electronically Signed: Young Blake MD  10/6/2024 11:03 AM EDT  Workstation ID: [id number]          Assessment & Plan     Abnormal soft tissue density in right hemipelvis: Suspicious for malignancy  Mesentery and right inguinal adenopathy  -imaging findings were reviewed as above. The overall picture is concerning for malignancy vs infectious/inflammatory etiology. Less likely to be sequela of prior radiation given long time having passed since XRT (30 years).   -She has right groin lymphadenopathy which may be accessible.    Would recommend IR biopsy for tissue diagnosis  -patient is agreeable to this plan.    Partial small bowel obstruction  -General Surgery has been consulted  -Gastroenterology is evaluated patient.  She is scheduled for outpatient colonoscopy on 10/16/2024.  -Planning for small bowel follow-through  -No surgical interventions recommended as per Surgery (Dr. Todd).     Leukocytosis  Thrombocytosis  Normocytic anemia  -Leukocytosis and  thrombocytosis may be reactive in nature.   -Iron panel is not consistent with IRISH, but more likely to be Anemia of chronic disease    Continue to monitor CBC     Personal history of cervical cancer diagnosed in 1990s  -Patient is status post hysterectomy and radiation treatment  -low concern for recurrence of  primary at this time.      Electronically signed by Mary Messina PA-C, 10/07/24      I have reviewed and confirmed the accuracy of the patient's history: Chief complaint, HPI, ROS, Subjective, and Past Family Social History as entered by the APRN/PA. Pertinent changes have been made to these sections to reflect my personal evaluation and exam findings.    . Marcelino Ricardo MD 10/07/24     Thank you for this consult. We will be happy to follow along with you.

## 2024-10-07 NOTE — CASE MANAGEMENT/SOCIAL WORK
Continued Stay Note  [facility]     Patient Name: Marilyn Pineda  MRN: [id number]  Today's Date: 10/7/2024    Admit Date: 10/6/2024    Plan: Anticipate routine home with spouse. Current OP PT [facility].   Discharge Plan       Row Name 10/07/24 1125       Plan    Plan Anticipate routine home with spouse. Current OP PT [facility].    Plan Comments Barrier to D/C: SBFT today, general surgery consult, GI and urology following.                      Expected Discharge Date and Time       Expected Discharge Date Expected Discharge Time    Oct 9, 2024           Phone communication or documentation only - no physical contact with patient or family.     Jacqueline Davis, BLA[place]N, [place] [address]    Office: [phone]  Fax: [phone]

## 2024-10-07 NOTE — CONSULTS
GI CONSULT  NOTE:    Referring Provider:  Dr. Chinchilla    Chief complaint: Abdominal pain, vomiting, diarrhea    Subjective .     History of present illness: Patient is a 68-year-old female with history of cervical cancer status post radiation and hysterectomy in  who presented to the hospital yesterday with complaints of diarrhea, abdominal pain, and vomiting.  She complains of generalized abdominal pain and diarrhea for the past week or more.  Began having vomiting 2 days ago so she came to the emergency room.  She no longer has abdominal pain today.  She vomited once this morning and it was brown in color but this is decreasing.  She denies hematemesis.  No NSAID use.  She denies any exacerbating factors.  Has lost some weight but reports this has been intentional.      Endo History:  No previous EGD or colonoscopy.    Past Medical History:  Past Medical History:   Diagnosis Date    Allergic     amoxicilin clayvix    Anemia     Arthritis 2024    In right  foot    Cancer     cervial cancer/ radiation and had hysterectomy    Pain of right hip joint 2018    Right lower lobe pneumonia 2018    Thrush, oral 2018       Past Surgical History:  Past Surgical History:   Procedure Laterality Date     SECTION  1984    HAND SURGERY      HYSTERECTOMY         Social History:  Social History     Tobacco Use    Smoking status: Never    Smokeless tobacco: Never   Vaping Use    Vaping status: Never Used   Substance Use Topics    Alcohol use: Not Currently     Comment: may drink a little wine but only very occasionally    Drug use: No       Family History:  Family History   Problem Relation Age of Onset    COPD Mother         from smoking for years    Miscarriages / Stillbirths Mother         had a miscarriage    Thyroid disease Father         is taking thyroid pil;    Cancer Maternal Grandmother         had lung cancer       Medications:  Medications Prior to Admission   Medication Sig  Dispense Refill Last Dose    BIOTIN PO Take 1 tablet by mouth Daily.       cyanocobalamin (VITAMIN B-12) 2500 MCG tablet tablet Take 500 mcg by mouth Daily.       Ferrous Sulfate (IRON PO) Take 65 mg by mouth Daily.       Misc Natural Products (BEET ROOT PO) Take 500 mg by mouth Daily.       Multiple Minerals-Vitamins (Calcium-Magnesium-Zinc-D3) tablet Take 1 tablet by mouth Daily.       multivitamin with minerals (MULTIVITAMIN WOMEN PO) Take 1 tablet by mouth Daily.       vitamin B-6 (PYRIDOXINE) 50 MG tablet Take 2 tablets by mouth Daily.          Scheduled Meds:pantoprazole, 40 mg, Intravenous, BID AC  sodium chloride, 10 mL, Intravenous, Q12H      Continuous Infusions:sodium chloride, 75 mL/hr, Last Rate: 75 mL/hr (10/06/24 6599)      PRN Meds:.  acetaminophen **OR** acetaminophen **OR** acetaminophen    aluminum-magnesium hydroxide-simethicone    Calcium Replacement - Follow Nurse / BPA Driven Protocol    dextrose    dextrose    glucagon (human recombinant)    hydrALAZINE    Magnesium Standard Dose Replacement - Follow Nurse / BPA Driven Protocol    melatonin    nitroglycerin    ondansetron ODT **OR** ondansetron    Phosphorus Replacement - Follow Nurse / BPA Driven Protocol    Potassium Replacement - Follow Nurse / BPA Driven Protocol    prochlorperazine    [COMPLETED] Insert Peripheral IV **AND** sodium chloride    sodium chloride    ALLERGIES:  Augmentin [amoxicillin-pot clavulanate]    ROS:  Review of Systems   Constitutional:  Negative for chills and fever.   Respiratory:  Negative for cough and shortness of breath.    Cardiovascular:  Negative for chest pain and palpitations.   Gastrointestinal:  Positive for diarrhea, nausea and vomiting. Negative for abdominal pain and blood in stool.   Neurological:  Negative for dizziness and weakness.   Psychiatric/Behavioral:  Negative for agitation and confusion.        Objective     Vital Signs:   Visit Vitals  /87 (BP Location: Left arm, Patient Position:  "Lying)   Pulse 74   Temp 98 °F (36.7 °C) (Oral)   Resp 18   Ht 165.1 cm (65\")   Wt 87 kg (191 lb 12.8 oz)   SpO2 98%   BMI 31.92 kg/m²       Physical Exam:      General Appearance:    Awake and alert, in no acute distress   Head:    Normocephalic, without obvious abnormality, atraumatic   Eyes:            Conjunctivae normal, anicteric sclera   Ears:    Ears appear intact with no abnormalities noted   Throat:   No oral lesions, no thrush, oral mucosa moist       Lungs:     Respirations regular, even and unlabored       Chest Wall:    No abnormalities observed   Abdomen:     Soft, minimal generalized tenderness, no rebound or guarding, non-distended, no hepatosplenomegaly   Rectal:     Deferred   Extremities:   Moves all extremities well, no edema, no cyanosis, no redness   Pulses:   Pulses palpable and equal bilaterally   Skin:   No bleeding, bruising or rash, no jaundice       Neurologic:   Sensation intact       Results Review:   I reviewed the patient's labs and imaging.  CBC  Results from last 7 days   Lab Units 10/07/24  0040 10/06/24  0937 10/01/24  1317   RBC 10*6/mm3 3.70* 4.13 4.06   WBC 10*3/mm3 16.21* 13.80* 10.79   HEMOGLOBIN g/dL 9.7* 10.7* 10.8*   PLATELETS 10*3/mm3 515* 578* 489*       CMP  Results from last 7 days   Lab Units 10/07/24  0040 10/06/24  0937 10/01/24  1317   SODIUM mmol/L 140 138 140   POTASSIUM mmol/L 4.1 4.0 4.5   CHLORIDE mmol/L 102 95* 101   CO2 mmol/L 27.8 27.6 28.0   BUN mg/dL 31* 27* 16   CREATININE mg/dL 0.93 1.04* 1.03*   GLUCOSE mg/dL 176* 158* 92   ALBUMIN g/dL  --  4.0 4.0   BILIRUBIN mg/dL  --  0.6 0.4   ALK PHOS U/L  --  87 82   AST (SGOT) U/L  --  19 16   ALT (SGPT) U/L  --  9 12       Amylase and Lipase  Results from last 7 days   Lab Units 10/06/24  0937   LIPASE U/L 11*       CRP         Imaging Results (Last 24 Hours)       Procedure Component Value Units Date/Time    XR Skull < 4 View [[id number]] Collected: 10/07/24 0800     Updated: 10/07/24 0815    Narrative:      " XR SKULL < 4 VW    Date of Exam: 10/6/2024 8:20 PM EDT    Indication: hard bump on back of head    Comparison: CT head without contrast 10/6/2024    Findings:  There is no lytic or suspicious appearing osseous lesion. There is a benign osteoma/exostosis which projects from the right posterior occipital region measuring 2.6 x 1.7 cm as previously described on prior head CT.      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        Electronically Signed: Jose Luis Murillo MD    10/7/2024 8:13 AM EDT    Workstation ID: [id number]    CT Abdomen Pelvis With Contrast [[id number]] Collected: 10/06/24 1526     Updated: 10/06/24 1546    Narrative:      CT CHEST W CONTRAST DIAGNOSTIC, CT ABDOMEN PELVIS W CONTRAST    Date of Exam: 10/6/2024 2:55 PM EDT    Indication: ? Neoplastic.    Comparison: CT abdomen pelvis 10/6/2024    Technique: Axial CT images were obtained of the chest after the uneventful intravenous administration of iodinated contrast.  Sagittal and coronal reconstructions were performed.  Automated exposure control and iterative reconstruction methods were used.      Findings:        Chest:    Kat/mediastinum: No adenopathy. Thoracic aorta normal in caliber. Minimal coronary calcification. No pericardial effusion    Lungs/pleura: No suspicious pulmonary nodule. Calcified granulomas on the right. No active pulmonary process. No pleural effusion or pleural tumor    Bones/soft tissues: No axillary adenopathy. No suspicious bone lesion      Abdomen/Pelvis:    Organs: 3 mm indeterminate lesion in the inferior liver (6/39). Focal fatty infiltration in the left hepatic lobe. Spleen, pancreas, adrenal glands unremarkable. Severe right-sided hydroureteronephrosis to just beyond the pelvic inlet where there is   obstruction caused by pelvic soft tissue described below. Right renal cortical thinning. No hydronephrosis on the left. Left renal cysts. Contracted  gallbladder    Gastrointestinal: Dilatation of the stomach and small bowel to the pelvis there is wall thickening of approximately 5 cm of the terminal ileum, with intestinal dilatation beginning proximal to this point. There is also wall thickening of the base of the   cecum. Colon is normal in caliber. Sigmoid diverticula are noted. Several enhancing mesenteric lymph nodes are present (79, 91, 101), measuring up to 1.3 cm (91)    Pelvis: Wall thickening of the urinary bladder. Uterus surgically absent. Ill-defined soft tissue in the right side of the upper pelvis. Ill-defined soft tissue causes obstruction of the right external and internal iliac veins. Iliac artery is patent.   There is thickening of the right iliopsoas muscle.    Peritoneum/Retroperitoneum: No ascites. No retroperitoneal adenopathy. Normal caliber aorta.    Bones/Soft Tissues: Widening of the right SI joint with erosive changes and some gas in the widened joint space. Sclerosis of the adjacent ilium and sacrum. Old pelvic bone fractures. Abdominal wall collaterals. Enlarged right groin lymph nodes up to 2.8   cm (129).      Impression:      Chest-  No evidence of metastatic disease. No acute process demonstrated    Abdomen/pelvis-  1. There is ill-defined soft tissue in the right side of the pelvis. Finding could represent treatment related changes and fibrosis. Tumor not excluded, in comparison with any outside prior imaging would be helpful with this regard. The abnormality   results in right ureteral obstruction which may be longstanding given the presence of cortical atrophy, and right iliac vein obstruction which is associated with collateral formation.  2. Distal small bowel obstruction with transition at the terminal ileum. Thickening of the terminal ileum may relate to radiation enteropathy or fibrosis. This may also account for thickening of the base of the cecum. Wall thickening of the urinary   bladder suggests radiation cystitis  3.  Abnormal appearance of the right SI joint and adjacent bones. 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 The presence of infection of the SI joint is not excluded  4. Thickening of the right iliopsoas muscle may be treatment related or secondary to adjacent SI joint infection if present  5. 3 mm indeterminate liver lesion, likely cyst. Follow-up recommended  6. Mild mesenteric adenopathy, and enlarged right groin lymph nodes          Electronically Signed: Abhishek Huang    10/6/2024 3:44 PM EDT    Workstation ID: [id number]    CT Chest With Contrast Diagnostic [[id number]] Collected: 10/06/24 1526     Updated: 10/06/24 1546    Narrative:      CT CHEST W CONTRAST DIAGNOSTIC, CT ABDOMEN PELVIS W CONTRAST    Date of Exam: 10/6/2024 2:55 PM EDT    Indication: ? Neoplastic.    Comparison: CT abdomen pelvis 10/6/2024    Technique: Axial CT images were obtained of the chest after the uneventful intravenous administration of iodinated contrast.  Sagittal and coronal reconstructions were performed.  Automated exposure control and iterative reconstruction methods were used.      Findings:        Chest:    Kat/mediastinum: No adenopathy. Thoracic aorta normal in caliber. Minimal coronary calcification. No pericardial effusion    Lungs/pleura: No suspicious pulmonary nodule. Calcified granulomas on the right. No active pulmonary process. No pleural effusion or pleural tumor    Bones/soft tissues: No axillary adenopathy. No suspicious bone lesion      Abdomen/Pelvis:    Organs: 3 mm indeterminate lesion in the inferior liver (6/39). Focal fatty infiltration in the left hepatic lobe. Spleen, pancreas, adrenal glands unremarkable. Severe right-sided hydroureteronephrosis to just beyond the pelvic inlet where there is   obstruction caused by pelvic soft tissue described below. Right renal cortical thinning. No hydronephrosis on the left. Left renal cysts. Contracted gallbladder    Gastrointestinal: Dilatation of the stomach  and small bowel to the pelvis there is wall thickening of approximately 5 cm of the terminal ileum, with intestinal dilatation beginning proximal to this point. There is also wall thickening of the base of the   cecum. Colon is normal in caliber. Sigmoid diverticula are noted. Several enhancing mesenteric lymph nodes are present (79, 91, 101), measuring up to 1.3 cm (91)    Pelvis: Wall thickening of the urinary bladder. Uterus surgically absent. Ill-defined soft tissue in the right side of the upper pelvis. Ill-defined soft tissue causes obstruction of the right external and internal iliac veins. Iliac artery is patent.   There is thickening of the right iliopsoas muscle.    Peritoneum/Retroperitoneum: No ascites. No retroperitoneal adenopathy. Normal caliber aorta.    Bones/Soft Tissues: Widening of the right SI joint with erosive changes and some gas in the widened joint space. Sclerosis of the adjacent ilium and sacrum. Old pelvic bone fractures. Abdominal wall collaterals. Enlarged right groin lymph nodes up to 2.8   cm (129).      Impression:      Chest-  No evidence of metastatic disease. No acute process demonstrated    Abdomen/pelvis-  1. There is ill-defined soft tissue in the right side of the pelvis. Finding could represent treatment related changes and fibrosis. Tumor not excluded, in comparison with any outside prior imaging would be helpful with this regard. The abnormality   results in right ureteral obstruction which may be longstanding given the presence of cortical atrophy, and right iliac vein obstruction which is associated with collateral formation.  2. Distal small bowel obstruction with transition at the terminal ileum. Thickening of the terminal ileum may relate to radiation enteropathy or fibrosis. This may also account for thickening of the base of the cecum. Wall thickening of the urinary   bladder suggests radiation cystitis  3. Abnormal appearance of the right SI joint and adjacent  bones. 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 The presence of infection of the SI joint is not excluded  4. Thickening of the right iliopsoas muscle may be treatment related or secondary to adjacent SI joint infection if present  5. 3 mm indeterminate liver lesion, likely cyst. Follow-up recommended  6. Mild mesenteric adenopathy, and enlarged right groin lymph nodes          Electronically Signed: Abhishek Huang    10/6/2024 3:44 PM EDT    Workstation ID: [id number]    CT Head With & Without Contrast [[id number]] Collected: 10/06/24 1518     Updated: 10/06/24 1522    Narrative:      CT HEAD W WO CONTRAST    Date of Exam: 10/6/2024 2:55 PM EDT    Indication: Metastasis ?.    Comparison: None available.    Technique: Axial CT images were obtained of the head before and after the uneventful intravenous administration of iodinated contrast.  Sagittal and coronal reconstructions were performed.  Automated exposure control and iterative reconstruction methods   were used.    Findings: Gray-white matter differentiation is maintained without evidence of an acute infarction. No intracranial mass or mass effect. No extra-axial mass or collection. The ventricles and sulci are normal in size and configuration. The posterior fossa   appears normal. Sellar and suprasellar structures are normal. No abnormal intracranial enhancement.    Orbital and paranasal soft tissues are normal. The paranasal sinuses, ethmoid air cells, and mastoid air cells are aerated. The bony calvarium appears intact. No acute fractures. No lytic or blastic bony diseases. Right occipital benign osteoma.      Impression:      Impression: No acute intracranial pathology. No abnormal intracranial enhancement.          Electronically Signed: Alejandro Baugh MD    10/6/2024 3:20 PM EDT    Workstation ID: [id number]    CT Abdomen Pelvis Without Contrast [[id number]] Collected: 10/06/24 1043     Updated: 10/06/24 1105    Narrative:      CT ABDOMEN PELVIS WO  CONTRAST    Date of Exam: 10/6/2024 10:04 AM EDT    Indication: Abdominal pain some vomiting and diarrhea.    Comparison: None available    Technique: Axial CT images were obtained of the abdomen and pelvis without the administration of contrast. Sagittal and coronal reconstructions were performed.  Automated exposure control and iterative reconstruction methods were used.      Findings:  The lung bases are clear bilaterally. Fluid is noted in the distal thoracic esophagus suggesting reflux.    The liver, spleen, and pancreas appear unremarkable. The adrenal glands appear normal bilaterally. The left kidney appears normal.     There is severe right renal parenchymal atrophy. There is severe right hydronephrosis and hydroureter down to the mid pelvic level. A hysterectomy has been performed. There are infiltrative changes throughout the pelvis, more prominent on the right.   There is conglomerate soft tissue density in the right hemipelvis measuring approximately 7.1 cm x 3.7 cm favored to at least in part represent fibrosis/post XRT change. There are prominent mesenteric lymph nodes noted in the lower abdomen and upper   pelvis measuring up to 1.1 cm in short axis. Right inguinal adenopathy measures up to 1.1 cm in short axis. A small amount of free fluid is noted in the pelvis. The urinary bladder is largely collapsed and not well evaluated. There is apparent wall   thickening of the urinary bladder. Colonic diverticulosis is noted. There is apparent wall thickening of the cecum. The cecum and terminal ileum lie in the region of the aforementioned conglomerate soft tissue density and are difficult to distinguish   from it. More proximally, there is dilatation of the small bowel suggesting small bowel obstruction. Small bowel loops measure up to 3.5 cm in diameter. No pneumoperitoneum is identified.    Prominent vessels are noted in the anterior lower abdominal and pelvic wall most consistent with collaterals.  Portions of the right external and internal iliac artery and vein are surrounded by the conglomerate density previously noted in the right   hemipelvis and are difficult to further evaluate without intravenous contrast.    There is posttraumatic deformity of the bilateral pubic bones and right SI joint which is abnormally widened, irregular and sclerotic. Sclerosis is noted elsewhere in the sacrum and posterior right iliac bone.          Impression:      Impression:  1.Abnormal soft tissue density centered in the right hemipelvis, nonspecific in appearance but suspected to largely represent fibrosis and/or post XRT changes. Other possibilities such as underlying neoplasm or infection are considered less likely,   however, not definitively excluded.  2.Partial versus early complete small bowel obstruction in this region.  3.Apparent wall thickening of the cecum favored to be secondary to post XRT change although superimposed active inflammation or neoplasm cannot be definitively excluded.  4.Obstruction of the distal right ureter by the above process with resulting severe right hydronephrosis and hydroureter. Severe right renal parenchymal atrophy.  5.Previous hysterectomy  6.Mesentery and right inguinal adenopathy in the lower abdomen and pelvis could be inflammatory or neoplastic in etiology.  7.Multiple vascular collaterals noted in the anterior pelvic wall suggesting some degree of obstruction of the right external and internal iliac artery and veins. Evaluation is limited by the lack of IV contrast.            Electronically Signed: Young Blake MD    10/6/2024 11:03 AM EDT    Workstation ID: [id number]              ASSESSMENT AND PLAN:  Nausea/vomiting/diarrhea  Abnormal CT suggesting distal small bowel obstruction with transition point, thickening of cecum  Abnormal appearance right SI joint and adjacent bones on CT  Indeterminate liver lesion on CT  Right sided hydronephrosis  Leukocytosis  Iron deficiency  anemia  History of cervical cancer status post radiation and hysterectomy 1993    Principal Problem:    SBO (small bowel obstruction)     Plan:  68-year-old female admitted 10/6/2024 with nausea/vomiting/diarrhea.  Found to have abnormal CT suggesting distal small bowel obstruction with transition point and thickening of the cecum along with other abnormal findings as above.    Patient has never had previous colonoscopy and is scheduled for outpatient colonoscopy 10/17/2024 due to having positive Cologuard in July 2024.  Hemoglobin 9.7 and she is iron deficient.  Platelets 515, WBC 16.  Liver enzymes and lipase normal.  She did have some vomiting this morning but smaller amount and feels some better.  General surgery has been consulted.  We will proceed with small bowel follow-through and continue supportive care.  Consider performing colonoscopy while inpatient if needed.  Monitor H&H and transfuse as needed.  Urology and oncology also have been consulted.    I discussed the patients findings and my recommendations with the patient.  Svetlana Naidu, HANNAH  10/07/24  10:30 EDT

## 2024-10-07 NOTE — PROGRESS NOTES
[facility] MEDICINE SERVICE  DAILY PROGRESS NOTE    NAME: Marilyn Pineda  : 1955  MRN: [id number]      LOS: 1 day     PROVIDER OF SERVICE: Barbara Chinchilla MD    Chief Complaint: SBO (small bowel obstruction)    Subjective:     Interval History:  History taken from: Patient and patient's chart     C/o diarrhea and vomiting        Review of Systems:   Review of Systems  All negative except above   Objective:     Vital Signs  Temp:  [98 °F (36.7 °C)-98.6 °F (37 °C)] 98.6 °F (37 °C)  Heart Rate:  [74-92] 79  Resp:  [15-20] 20  BP: (139-164)/(87-98) 147/88   Body mass index is 31.92 kg/m².    Physical Exam  Physical Exam  General: Alert and oriented, no acute distress.  HENT: Normocephalic, moist oral mucosa, no scleral icterus.  Neck: Supple, nontender, no carotid bruits, no JVD, no LAD.  Lungs: Clear to auscultation, nonlabored respiration.  Heart: RRR, no murmur, gallop or edema.  Abdomen: Soft, mild epigastric tenderness, nondistended, + bowel sounds.  Musculoskeletal: Normal range of motion and strength, no tenderness or swelling.  Neuro: alert and awake, moving all 4 extremities   Skin: Skin is warm, dry and pink, no rashes or lesions.  Psychiatric: Cooperative, appropriate mood and affect.         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 Electronically Signed: Jose Luis Murillo MD  10/7/2024 8:13 AM EDT  Workstation ID: [id number]    CT Abdomen Pelvis  With Contrast    Result Date: 10/6/2024  Chest- No evidence of metastatic disease. No acute process demonstrated Abdomen/pelvis- 1. There is ill-defined soft tissue in the right side of the pelvis. Finding could represent treatment related changes and fibrosis. Tumor not excluded, in comparison with any outside prior imaging would be helpful with this regard. The abnormality results in right ureteral obstruction which may be longstanding given the presence of cortical atrophy, and right iliac vein obstruction which is associated with collateral formation. 2. Distal small bowel obstruction with transition at the terminal ileum. Thickening of the terminal ileum may relate to radiation enteropathy or fibrosis. This may also account for thickening of the base of the cecum. Wall thickening of the urinary bladder suggests radiation cystitis 3. Abnormal appearance of the right SI joint and adjacent bones. 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 The presence of infection of the SI joint is not excluded 4. Thickening of the right iliopsoas muscle may be treatment related or secondary to adjacent SI joint infection if present 5. 3 mm indeterminate liver lesion, likely cyst. Follow-up recommended 6. Mild mesenteric adenopathy, and enlarged right groin lymph nodes Electronically Signed: Abhishek Huang  10/6/2024 3:44 PM EDT  Workstation ID: [id number]    CT Chest With Contrast Diagnostic    Result Date: 10/6/2024  Chest- No evidence of metastatic disease. No acute process demonstrated Abdomen/pelvis- 1. There is ill-defined soft tissue in the right side of the pelvis. Finding could represent treatment related changes and fibrosis. Tumor not excluded, in comparison with any outside prior imaging would be helpful with this regard. The abnormality results in right ureteral obstruction which may be longstanding given the presence of cortical atrophy, and right iliac vein obstruction which is associated with collateral  formation. 2. Distal small bowel obstruction with transition at the terminal ileum. Thickening of the terminal ileum may relate to radiation enteropathy or fibrosis. This may also account for thickening of the base of the cecum. Wall thickening of the urinary bladder suggests radiation cystitis 3. Abnormal appearance of the right SI joint and adjacent bones. 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 The presence of infection of the SI joint is not excluded 4. Thickening of the right iliopsoas muscle may be treatment related or secondary to adjacent SI joint infection if present 5. 3 mm indeterminate liver lesion, likely cyst. Follow-up recommended 6. Mild mesenteric adenopathy, and enlarged right groin lymph nodes Electronically Signed: Abhishek Huang  10/6/2024 3:44 PM EDT  Workstation ID: [id number]    CT Head With & Without Contrast    Result Date: 10/6/2024  Impression: No acute intracranial pathology. No abnormal intracranial enhancement. Electronically Signed: Alejandro Baugh MD  10/6/2024 3:20 PM EDT  Workstation ID: [id number]    CT Abdomen Pelvis Without Contrast    Result Date: 10/6/2024  Impression: 1.Abnormal soft tissue density centered in the right hemipelvis, nonspecific in appearance but suspected to largely represent fibrosis and/or post XRT changes. Other possibilities such as underlying neoplasm or infection are considered less likely, however, not definitively excluded. 2.Partial versus early complete small bowel obstruction in this region. 3.Apparent wall thickening of the cecum favored to be secondary to post XRT change although superimposed active inflammation or neoplasm cannot be definitively excluded. 4.Obstruction of the distal right ureter by the above process with resulting severe right hydronephrosis and hydroureter. Severe right renal parenchymal atrophy. 5.Previous hysterectomy 6.Mesentery and right inguinal adenopathy in the lower abdomen and pelvis could be inflammatory or  neoplastic in etiology. 7.Multiple vascular collaterals noted in the anterior pelvic wall suggesting some degree of obstruction of the right external and internal iliac artery and veins. Evaluation is limited by the lack of IV contrast. Electronically Signed: Young Blake MD  10/6/2024 11:03 AM EDT  Workstation ID: [id number]       I have reviewed patient labs and imaging     Assessment/Plan:     Active and Resolved Problems  # Nausea and vomiting secondary to ? SBO  # Abdominal soft tissue density in right hemipelvis, malignancy to exclude  #Severe right-sided hydronephrosis  -Patient presented with nausea and vomiting - brown / blackish color   -CTAP with partial versus early complete small bowel obstruction.  -Patient has abnormal soft tissue density in right hemipelvis as well.  -Right severe hydronephrosis present  -there is wall thickening of the cecum favored to be secondary to post XRT change although superimposed active inflammation or neoplasm cannot be definitively excluded.   -GI/general surgery/urologist and heme-onc consult  -PPI bid   -N.p.o. IV fluid hypoglycemia protocol           VTE Prophylaxis:  Mechanical VTE prophylaxis orders are present.             Disposition Planning:     Barriers to Discharge:medical clearance   Anticipated Date of Discharge: 10/10  Place of Discharge: home      Time: 40 minutes     There are no questions and answers to display.       Signature: Electronically signed by Barbara Chinchilla MD, 10/07/24, 16:37 EDT.  [facility] Hospitalist Team

## 2024-10-07 NOTE — CONSULTS
General Surgery Consult Note      Name: Marilyn Pineda ADMIT: 10/6/2024   : 1955  PCP: Cinthia Brown MD    MRN: [id number] LOS: 1 days   AGE/SEX: 68 y.o. female  ROOM: [address] [place] [address]      Patient Care Team:  Cinthia Brown MD as PCP - General (Internal Medicine & Pediatrics)  Patrick Leblanc APRN (Obstetrics and Gynecology)  Chief Complaint   Patient presents with    Diarrhea    Vomiting    Weakness - Generalized       HPI  68 y.o. female with a prior history of cervical cancer status post hysterectomy and radiation who presents for diarrhea, abdominal distention, nausea, vomiting.  She reports she had diarrhea for approximately 1 week prior to presenting to the emergency department and a 1 day history of nausea and vomiting.  Denies any significant pain except for when vomiting.  Denies any fevers or chills.  She had a CT scan of the abdomen/pelvis which was concerning for possible bowel obstruction secondary to thickening of the terminal ileum.  Since admission she reports her symptoms have improved and she has had multiple bowel movements with resolution of her nausea and vomiting.  Of note the patient does states she has chronic lymphedema of the right lower extremity.    Past Medical History:   Diagnosis Date    Allergic     amoxicilin clayvix    Anemia     Arthritis 2024    In right  foot    Cancer     cervial cancer/ radiation and had hysterectomy    Pain of right hip joint 2018    Right lower lobe pneumonia 2018    Thrush, oral 2018     Past Surgical History:   Procedure Laterality Date     SECTION  1984    HAND SURGERY      HYSTERECTOMY       Family History   Problem Relation Age of Onset    COPD Mother         from smoking for years    Miscarriages / Stillbirths Mother         had a miscarriage    Thyroid disease Father         is taking thyroid pil;    Cancer Maternal Grandmother         had lung cancer       Social History      Tobacco Use    Smoking status: Never    Smokeless tobacco: Never   Vaping Use    Vaping status: Never Used   Substance Use Topics    Alcohol use: Not Currently     Comment: may drink a little wine but only very occasionally    Drug use: No     Medications Prior to Admission   Medication Sig Dispense Refill Last Dose    BIOTIN PO Take 1 tablet by mouth Daily.       cyanocobalamin (VITAMIN B-12) 2500 MCG tablet tablet Take 500 mcg by mouth Daily.       Ferrous Sulfate (IRON PO) Take 65 mg by mouth Daily.       Misc Natural Products (BEET ROOT PO) Take 500 mg by mouth Daily.       Multiple Minerals-Vitamins (Calcium-Magnesium-Zinc-D3) tablet Take 1 tablet by mouth Daily.       multivitamin with minerals (MULTIVITAMIN WOMEN PO) Take 1 tablet by mouth Daily.       vitamin B-6 (PYRIDOXINE) 50 MG tablet Take 2 tablets by mouth Daily.        pantoprazole, 40 mg, Intravenous, BID AC  sodium chloride, 10 mL, Intravenous, Q12H      sodium chloride, 75 mL/hr, Last Rate: 75 mL/hr (10/06/24 3405)        acetaminophen **OR** acetaminophen **OR** acetaminophen    aluminum-magnesium hydroxide-simethicone    Calcium Replacement - Follow Nurse / BPA Driven Protocol    dextrose    dextrose    glucagon (human recombinant)    hydrALAZINE    Magnesium Standard Dose Replacement - Follow Nurse / BPA Driven Protocol    melatonin    nitroglycerin    ondansetron ODT **OR** ondansetron    Phosphorus Replacement - Follow Nurse / BPA Driven Protocol    Potassium Replacement - Follow Nurse / BPA Driven Protocol    prochlorperazine    [COMPLETED] Insert Peripheral IV **AND** sodium chloride    sodium chloride  Augmentin [amoxicillin-pot clavulanate]    Review of Systems:   As noted above in HPI    Vitals:  Temp:  [98 °F (36.7 °C)-98.6 °F (37 °C)] 98.6 °F (37 °C)  Heart Rate:  [74-92] 79  Resp:  [15-20] 20  BP: (139-164)/(87-98) 147/88     Physical Exam:   No acute distress, alert  Nonlabored respirations  Abdomen soft, nondistended, well-healed  low transverse incision as well as lower midline incision, nontender to palpation  Extremities warm well-perfused    Labs:  Results from last 7 days   Lab Units 10/07/24  0040 10/06/24  0937 10/01/24  1317   WBC 10*3/mm3 16.21* 13.80* 10.79   HEMOGLOBIN g/dL 9.7* 10.7* 10.8*   HEMATOCRIT % 31.4* 34.1 34.3   PLATELETS 10*3/mm3 515* 578* 489*     Results from last 7 days   Lab Units 10/07/24  0040 10/06/24  0937 10/01/24  1317   SODIUM mmol/L 140 138 140   POTASSIUM mmol/L 4.1 4.0 4.5   CHLORIDE mmol/L 102 95* 101   CO2 mmol/L 27.8 27.6 28.0   BUN mg/dL 31* 27* 16   CREATININE mg/dL 0.93 1.04* 1.03*   CALCIUM mg/dL 8.9 9.4 9.5   BILIRUBIN mg/dL  --  0.6 0.4   ALK PHOS U/L  --  87 82   ALT (SGPT) U/L  --  9 12   AST (SGOT) U/L  --  19 16   GLUCOSE mg/dL 176* 158* 92     Imaging:  CT abdomen/pelvis 10/6/2024  Abdomen/pelvis-  1. There is ill-defined soft tissue in the right side of the pelvis. Finding could represent treatment related changes and fibrosis. Tumor not excluded, in comparison with any outside prior imaging would be helpful with this regard. The abnormality   results in right ureteral obstruction which may be longstanding given the presence of cortical atrophy, and right iliac vein obstruction which is associated with collateral formation.  2. Distal small bowel obstruction with transition at the terminal ileum. Thickening of the terminal ileum may relate to radiation enteropathy or fibrosis. This may also account for thickening of the base of the cecum. Wall thickening of the urinary   bladder suggests radiation cystitis  3. Abnormal appearance of the right SI joint and adjacent bones. 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 The presence of infection of the SI joint is not excluded  4. Thickening of the right iliopsoas muscle may be treatment related or secondary to adjacent SI joint infection if present  5. 3 mm indeterminate liver lesion, likely cyst. Follow-up recommended  6. Mild mesenteric  adenopathy, and enlarged right groin lymph nodes    Assessment and Plan:  68 y.o. female with signs and symptoms of partial obstruction secondary to thickened terminal ileum.  Suspect this may be reactive to prior history of cervical cancer with radiation.    - Patient is clinically improved and small bowel follow-through shows passage of contrast without evidence of obstruction at this time  - From my standpoint would avoid any type of surgical intervention especially in the setting of pelvic radiation  -Agree with colonoscopy; currently scheduled to be done as an outpatient and if anything is identified she may follow-up with me in the office  -Oncology on consult and rec biopsy of enlarged lymph nodes in the right groin in the setting of prior history of cervical cancer can secondary to chronic lymphedema from radiation causing fibrosis and compression of vessels  -From a surgical standpoint recommending starting clear liquid diet and advancing as tolerated and if tolerates okay to discharge    This note was created using Dragon Voice Recognition software.    Marian Todd MD  10/07/24  19:06 EDT

## 2024-10-07 NOTE — ACP (ADVANCE CARE PLANNING)
met with patient to complete ACP.  Physical copy placed in patient's chart; copy faxed to medical records.  Original returned to patient.

## 2024-10-07 NOTE — PROGRESS NOTES
"  [facility] PROGRESS NOTE    Patient Identification  Name: Marilyn Pineda  Age: 68 y.o.  Sex: female  :  1955  MRN: [id number]    Date: 10/7/2024             Subjective:  Interval History: Right hydroureteronephrosis. Patient reports improved urinary output. Cr down to 0.93 today from 1.03 yesterday.    Objective:    Scheduled Meds:pantoprazole, 40 mg, Intravenous, BID AC  sodium chloride, 10 mL, Intravenous, Q12H      Continuous Infusions:sodium chloride, 75 mL/hr, Last Rate: 75 mL/hr (10/06/24 5395)      PRN Meds:  acetaminophen **OR** acetaminophen **OR** acetaminophen    aluminum-magnesium hydroxide-simethicone    Calcium Replacement - Follow Nurse / BPA Driven Protocol    dextrose    dextrose    glucagon (human recombinant)    hydrALAZINE    Magnesium Standard Dose Replacement - Follow Nurse / BPA Driven Protocol    melatonin    nitroglycerin    ondansetron ODT **OR** ondansetron    Phosphorus Replacement - Follow Nurse / BPA Driven Protocol    Potassium Replacement - Follow Nurse / BPA Driven Protocol    prochlorperazine    [COMPLETED] Insert Peripheral IV **AND** sodium chloride    sodium chloride    Vital signs in last 24 hours:  Temp:  [98 °F (36.7 °C)-98.5 °F (36.9 °C)] 98 °F (36.7 °C)  Heart Rate:  [70-92] 74  Resp:  [15-18] 18  BP: (139-206)/() 139/87    Intake/Output:    Intake/Output Summary (Last 24 hours) at 10/7/2024 0901  Last data filed at 10/6/2024 1506  Gross per 24 hour   Intake 1000 ml   Output --   Net 1000 ml       Exam:  /87 (BP Location: Left arm, Patient Position: Lying)   Pulse 74   Temp 98 °F (36.7 °C) (Oral)   Resp 18   Ht 165.1 cm (65\")   Wt 87 kg (191 lb 12.8 oz)   SpO2 98%   BMI 31.92 kg/m²     General Appearance:    Alert, cooperative, NAD   Lungs:     Respirations unlabored, no audible wheezing    Heart:    No cyanosis   Abdomen:     Soft, ND    :    No suprapubic distention            Data Review:  All labs (24hrs):   Recent Results (from " the past 24 hour(s))   Comprehensive Metabolic Panel    Collection Time: 10/06/24  9:37 AM    Specimen: Blood   Result Value Ref Range    Glucose 158 (H) 65 - 99 mg/dL    BUN 27 (H) 8 - 23 mg/dL    Creatinine 1.04 (H) 0.57 - 1.00 mg/dL    Sodium 138 136 - 145 mmol/L    Potassium 4.0 3.5 - 5.2 mmol/L    Chloride 95 (L) 98 - 107 mmol/L    CO2 27.6 22.0 - 29.0 mmol/L    Calcium 9.4 8.6 - 10.5 mg/dL    Total Protein 8.6 (H) 6.0 - 8.5 g/dL    Albumin 4.0 3.5 - 5.2 g/dL    ALT (SGPT) 9 1 - 33 U/L    AST (SGOT) 19 1 - 32 U/L    Alkaline Phosphatase 87 39 - 117 U/L    Total Bilirubin 0.6 0.0 - 1.2 mg/dL    Globulin 4.6 gm/dL    A/G Ratio 0.9 g/dL    BUN/Creatinine Ratio 26.0 (H) 7.0 - 25.0    Anion Gap 15.4 (H) 5.0 - 15.0 mmol/L    eGFR 59.0 (L) >60.0 mL/min/1.73   Lipase    Collection Time: 10/06/24  9:37 AM    Specimen: Blood   Result Value Ref Range    Lipase 11 (L) 13 - 60 U/L   CBC Auto Differential    Collection Time: 10/06/24  9:37 AM    Specimen: Blood   Result Value Ref Range    WBC 13.80 (H) 3.40 - 10.80 10*3/mm3    RBC 4.13 3.77 - 5.28 10*6/mm3    Hemoglobin 10.7 (L) 12.0 - 15.9 g/dL    Hematocrit 34.1 34.0 - 46.6 %    MCV 82.6 79.0 - 97.0 fL    MCH 25.9 (L) 26.6 - 33.0 pg    MCHC 31.4 (L) 31.5 - 35.7 g/dL    RDW 15.4 12.3 - 15.4 %    RDW-SD 46.8 37.0 - 54.0 fl    MPV 8.6 6.0 - 12.0 fL    Platelets 578 (H) 140 - 450 10*3/mm3    Neutrophil % 93.0 (H) 42.7 - 76.0 %    Lymphocyte % 3.0 (L) 19.6 - 45.3 %    Monocyte % 3.4 (L) 5.0 - 12.0 %    Eosinophil % 0.0 (L) 0.3 - 6.2 %    Basophil % 0.1 0.0 - 1.5 %    Immature Grans % 0.5 0.0 - 0.5 %    Neutrophils, Absolute 12.83 (H) 1.70 - 7.00 10*3/mm3    Lymphocytes, Absolute 0.41 (L) 0.70 - 3.10 10*3/mm3    Monocytes, Absolute 0.47 0.10 - 0.90 10*3/mm3    Eosinophils, Absolute 0.00 0.00 - 0.40 10*3/mm3    Basophils, Absolute 0.02 0.00 - 0.20 10*3/mm3    Immature Grans, Absolute 0.07 (H) 0.00 - 0.05 10*3/mm3    nRBC 0.0 0.0 - 0.2 /100 WBC   COVID-19 and FLU A/B PCR, 1 HR TAT  - Swab, Nasopharynx    Collection Time: 10/06/24  9:37 AM    Specimen: Nasopharynx; Swab   Result Value Ref Range    COVID19 Not Detected Not Detected - Ref. Range    Influenza A PCR Not Detected Not Detected    Influenza B PCR Not Detected Not Detected   Urinalysis With Microscopic If Indicated (No Culture) - Urine, Clean Catch    Collection Time: 10/06/24  9:48 AM    Specimen: Urine, Clean Catch   Result Value Ref Range    Color, UA Yellow Yellow, Straw    Appearance, UA Clear Clear    pH, UA 7.0 5.0 - 8.0    Specific Gravity, UA 1.022 1.005 - 1.030    Glucose, UA Negative Negative    Ketones, UA 15 mg/dL (1+) (A) Negative    Bilirubin, UA Negative Negative    Blood, UA Negative Negative    Protein, UA 30 mg/dL (1+) (A) Negative    Leuk Esterase, UA Small (1+) (A) Negative    Nitrite, UA Negative Negative    Urobilinogen, UA 1.0 E.U./dL 0.2 - 1.0 E.U./dL   Urinalysis, Microscopic Only - Urine, Clean Catch    Collection Time: 10/06/24  9:48 AM    Specimen: Urine, Clean Catch   Result Value Ref Range    RBC, UA 0-2 None Seen, 0-2 /HPF    WBC, UA 3-5 (A) None Seen, 0-2 /HPF    Bacteria, UA None Seen None Seen /HPF    Squamous Epithelial Cells, UA 13-20 (A) None Seen, 0-2 /HPF    Hyaline Casts, UA 7-12 None Seen /LPF    Methodology Manual Light Microscopy    POC Glucose Once    Collection Time: 10/06/24  5:57 PM    Specimen: Blood   Result Value Ref Range    Glucose 117 (H) 70 - 105 mg/dL   POC Glucose Once    Collection Time: 10/07/24 12:39 AM    Specimen: Blood   Result Value Ref Range    Glucose 127 (H) 70 - 105 mg/dL   Basic Metabolic Panel    Collection Time: 10/07/24 12:40 AM    Specimen: Arm, Left; Blood   Result Value Ref Range    Glucose 176 (H) 65 - 99 mg/dL    BUN 31 (H) 8 - 23 mg/dL    Creatinine 0.93 0.57 - 1.00 mg/dL    Sodium 140 136 - 145 mmol/L    Potassium 4.1 3.5 - 5.2 mmol/L    Chloride 102 98 - 107 mmol/L    CO2 27.8 22.0 - 29.0 mmol/L    Calcium 8.9 8.6 - 10.5 mg/dL    BUN/Creatinine Ratio 33.3  (H) 7.0 - 25.0    Anion Gap 10.2 5.0 - 15.0 mmol/L    eGFR 67.5 >60.0 mL/min/1.73   Magnesium    Collection Time: 10/07/24 12:40 AM    Specimen: Arm, Left; Blood   Result Value Ref Range    Magnesium 2.7 (H) 1.6 - 2.4 mg/dL   Phosphorus    Collection Time: 10/07/24 12:40 AM    Specimen: Arm, Left; Blood   Result Value Ref Range    Phosphorus 5.3 (H) 2.5 - 4.5 mg/dL   CBC Auto Differential    Collection Time: 10/07/24 12:40 AM    Specimen: Arm, Left; Blood   Result Value Ref Range    WBC 16.21 (H) 3.40 - 10.80 10*3/mm3    RBC 3.70 (L) 3.77 - 5.28 10*6/mm3    Hemoglobin 9.7 (L) 12.0 - 15.9 g/dL    Hematocrit 31.4 (L) 34.0 - 46.6 %    MCV 84.9 79.0 - 97.0 fL    MCH 26.2 (L) 26.6 - 33.0 pg    MCHC 30.9 (L) 31.5 - 35.7 g/dL    RDW 15.7 (H) 12.3 - 15.4 %    RDW-SD 48.2 37.0 - 54.0 fl    MPV 8.5 6.0 - 12.0 fL    Platelets 515 (H) 140 - 450 10*3/mm3    Neutrophil % 86.6 (H) 42.7 - 76.0 %    Lymphocyte % 5.5 (L) 19.6 - 45.3 %    Monocyte % 6.2 5.0 - 12.0 %    Eosinophil % 0.7 0.3 - 6.2 %    Basophil % 0.2 0.0 - 1.5 %    Immature Grans % 0.8 (H) 0.0 - 0.5 %    Neutrophils, Absolute 14.03 (H) 1.70 - 7.00 10*3/mm3    Lymphocytes, Absolute 0.89 0.70 - 3.10 10*3/mm3    Monocytes, Absolute 1.01 (H) 0.10 - 0.90 10*3/mm3    Eosinophils, Absolute 0.11 0.00 - 0.40 10*3/mm3    Basophils, Absolute 0.04 0.00 - 0.20 10*3/mm3    Immature Grans, Absolute 0.13 (H) 0.00 - 0.05 10*3/mm3    nRBC 0.0 0.0 - 0.2 /100 WBC   POC Glucose Once    Collection Time: 10/07/24  5:46 AM    Specimen: Blood   Result Value Ref Range    Glucose 127 (H) 70 - 105 mg/dL      Imaging Results (Last 24 Hours)       Procedure Component Value Units Date/Time    XR Skull < 4 View [[id number]] Collected: 10/07/24 0800     Updated: 10/07/24 0815    Narrative:      XR SKULL < 4 VW    Date of Exam: 10/6/2024 8:20 PM EDT    Indication: hard bump on back of head    Comparison: CT head without contrast 10/6/2024    Findings:  There is no lytic or suspicious appearing osseous  lesion. There is a benign osteoma/exostosis which projects from the right posterior occipital region measuring 2.6 x 1.7 cm as previously described on prior head CT.      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        Electronically Signed: Jose Luis Murillo MD    10/7/2024 8:13 AM EDT    Workstation ID: [id number]    CT Abdomen Pelvis With Contrast [[id number]] Collected: 10/06/24 1526     Updated: 10/06/24 1546    Narrative:      CT CHEST W CONTRAST DIAGNOSTIC, CT ABDOMEN PELVIS W CONTRAST    Date of Exam: 10/6/2024 2:55 PM EDT    Indication: ? Neoplastic.    Comparison: CT abdomen pelvis 10/6/2024    Technique: Axial CT images were obtained of the chest after the uneventful intravenous administration of iodinated contrast.  Sagittal and coronal reconstructions were performed.  Automated exposure control and iterative reconstruction methods were used.      Findings:        Chest:    Kat/mediastinum: No adenopathy. Thoracic aorta normal in caliber. Minimal coronary calcification. No pericardial effusion    Lungs/pleura: No suspicious pulmonary nodule. Calcified granulomas on the right. No active pulmonary process. No pleural effusion or pleural tumor    Bones/soft tissues: No axillary adenopathy. No suspicious bone lesion      Abdomen/Pelvis:    Organs: 3 mm indeterminate lesion in the inferior liver (6/39). Focal fatty infiltration in the left hepatic lobe. Spleen, pancreas, adrenal glands unremarkable. Severe right-sided hydroureteronephrosis to just beyond the pelvic inlet where there is   obstruction caused by pelvic soft tissue described below. Right renal cortical thinning. No hydronephrosis on the left. Left renal cysts. Contracted gallbladder    Gastrointestinal: Dilatation of the stomach and small bowel to the pelvis there is wall thickening of approximately 5 cm of the terminal ileum, with intestinal dilatation beginning proximal to this point.  There is also wall thickening of the base of the   cecum. Colon is normal in caliber. Sigmoid diverticula are noted. Several enhancing mesenteric lymph nodes are present (79, 91, 101), measuring up to 1.3 cm (91)    Pelvis: Wall thickening of the urinary bladder. Uterus surgically absent. Ill-defined soft tissue in the right side of the upper pelvis. Ill-defined soft tissue causes obstruction of the right external and internal iliac veins. Iliac artery is patent.   There is thickening of the right iliopsoas muscle.    Peritoneum/Retroperitoneum: No ascites. No retroperitoneal adenopathy. Normal caliber aorta.    Bones/Soft Tissues: Widening of the right SI joint with erosive changes and some gas in the widened joint space. Sclerosis of the adjacent ilium and sacrum. Old pelvic bone fractures. Abdominal wall collaterals. Enlarged right groin lymph nodes up to 2.8   cm (129).      Impression:      Chest-  No evidence of metastatic disease. No acute process demonstrated    Abdomen/pelvis-  1. There is ill-defined soft tissue in the right side of the pelvis. Finding could represent treatment related changes and fibrosis. Tumor not excluded, in comparison with any outside prior imaging would be helpful with this regard. The abnormality   results in right ureteral obstruction which may be longstanding given the presence of cortical atrophy, and right iliac vein obstruction which is associated with collateral formation.  2. Distal small bowel obstruction with transition at the terminal ileum. Thickening of the terminal ileum may relate to radiation enteropathy or fibrosis. This may also account for thickening of the base of the cecum. Wall thickening of the urinary   bladder suggests radiation cystitis  3. Abnormal appearance of the right SI joint and adjacent bones. 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 The presence of infection of the SI joint is not excluded  4. Thickening of the right iliopsoas muscle may  be treatment related or secondary to adjacent SI joint infection if present  5. 3 mm indeterminate liver lesion, likely cyst. Follow-up recommended  6. Mild mesenteric adenopathy, and enlarged right groin lymph nodes          Electronically Signed: Abhishek Huang    10/6/2024 3:44 PM EDT    Workstation ID: [id number]    CT Chest With Contrast Diagnostic [[id number]] Collected: 10/06/24 1526     Updated: 10/06/24 1546    Narrative:      CT CHEST W CONTRAST DIAGNOSTIC, CT ABDOMEN PELVIS W CONTRAST    Date of Exam: 10/6/2024 2:55 PM EDT    Indication: ? Neoplastic.    Comparison: CT abdomen pelvis 10/6/2024    Technique: Axial CT images were obtained of the chest after the uneventful intravenous administration of iodinated contrast.  Sagittal and coronal reconstructions were performed.  Automated exposure control and iterative reconstruction methods were used.      Findings:        Chest:    Kat/mediastinum: No adenopathy. Thoracic aorta normal in caliber. Minimal coronary calcification. No pericardial effusion    Lungs/pleura: No suspicious pulmonary nodule. Calcified granulomas on the right. No active pulmonary process. No pleural effusion or pleural tumor    Bones/soft tissues: No axillary adenopathy. No suspicious bone lesion      Abdomen/Pelvis:    Organs: 3 mm indeterminate lesion in the inferior liver (6/39). Focal fatty infiltration in the left hepatic lobe. Spleen, pancreas, adrenal glands unremarkable. Severe right-sided hydroureteronephrosis to just beyond the pelvic inlet where there is   obstruction caused by pelvic soft tissue described below. Right renal cortical thinning. No hydronephrosis on the left. Left renal cysts. Contracted gallbladder    Gastrointestinal: Dilatation of the stomach and small bowel to the pelvis there is wall thickening of approximately 5 cm of the terminal ileum, with intestinal dilatation beginning proximal to this point. There is also wall thickening of the base of the   cecum.  Colon is normal in caliber. Sigmoid diverticula are noted. Several enhancing mesenteric lymph nodes are present (79, 91, 101), measuring up to 1.3 cm (91)    Pelvis: Wall thickening of the urinary bladder. Uterus surgically absent. Ill-defined soft tissue in the right side of the upper pelvis. Ill-defined soft tissue causes obstruction of the right external and internal iliac veins. Iliac artery is patent.   There is thickening of the right iliopsoas muscle.    Peritoneum/Retroperitoneum: No ascites. No retroperitoneal adenopathy. Normal caliber aorta.    Bones/Soft Tissues: Widening of the right SI joint with erosive changes and some gas in the widened joint space. Sclerosis of the adjacent ilium and sacrum. Old pelvic bone fractures. Abdominal wall collaterals. Enlarged right groin lymph nodes up to 2.8   cm (129).      Impression:      Chest-  No evidence of metastatic disease. No acute process demonstrated    Abdomen/pelvis-  1. There is ill-defined soft tissue in the right side of the pelvis. Finding could represent treatment related changes and fibrosis. Tumor not excluded, in comparison with any outside prior imaging would be helpful with this regard. The abnormality   results in right ureteral obstruction which may be longstanding given the presence of cortical atrophy, and right iliac vein obstruction which is associated with collateral formation.  2. Distal small bowel obstruction with transition at the terminal ileum. Thickening of the terminal ileum may relate to radiation enteropathy or fibrosis. This may also account for thickening of the base of the cecum. Wall thickening of the urinary   bladder suggests radiation cystitis  3. Abnormal appearance of the right SI joint and adjacent bones. 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 The presence of infection of the SI joint is not excluded  4. Thickening of the right iliopsoas muscle may be treatment related or secondary to adjacent SI joint  infection if present  5. 3 mm indeterminate liver lesion, likely cyst. Follow-up recommended  6. Mild mesenteric adenopathy, and enlarged right groin lymph nodes          Electronically Signed: Abhishek Huang    10/6/2024 3:44 PM EDT    Workstation ID: [id number]    CT Head With & Without Contrast [[id number]] Collected: 10/06/24 1518     Updated: 10/06/24 1522    Narrative:      CT HEAD W WO CONTRAST    Date of Exam: 10/6/2024 2:55 PM EDT    Indication: Metastasis ?.    Comparison: None available.    Technique: Axial CT images were obtained of the head before and after the uneventful intravenous administration of iodinated contrast.  Sagittal and coronal reconstructions were performed.  Automated exposure control and iterative reconstruction methods   were used.    Findings: Gray-white matter differentiation is maintained without evidence of an acute infarction. No intracranial mass or mass effect. No extra-axial mass or collection. The ventricles and sulci are normal in size and configuration. The posterior fossa   appears normal. Sellar and suprasellar structures are normal. No abnormal intracranial enhancement.    Orbital and paranasal soft tissues are normal. The paranasal sinuses, ethmoid air cells, and mastoid air cells are aerated. The bony calvarium appears intact. No acute fractures. No lytic or blastic bony diseases. Right occipital benign osteoma.      Impression:      Impression: No acute intracranial pathology. No abnormal intracranial enhancement.          Electronically Signed: Alejandro Baugh MD    10/6/2024 3:20 PM EDT    Workstation ID: [id number]    CT Abdomen Pelvis Without Contrast [[id number]] Collected: 10/06/24 1043     Updated: 10/06/24 1105    Narrative:      CT ABDOMEN PELVIS WO CONTRAST    Date of Exam: 10/6/2024 10:04 AM EDT    Indication: Abdominal pain some vomiting and diarrhea.    Comparison: None available    Technique: Axial CT images were obtained of the abdomen and pelvis without the  administration of contrast. Sagittal and coronal reconstructions were performed.  Automated exposure control and iterative reconstruction methods were used.      Findings:  The lung bases are clear bilaterally. Fluid is noted in the distal thoracic esophagus suggesting reflux.    The liver, spleen, and pancreas appear unremarkable. The adrenal glands appear normal bilaterally. The left kidney appears normal.     There is severe right renal parenchymal atrophy. There is severe right hydronephrosis and hydroureter down to the mid pelvic level. A hysterectomy has been performed. There are infiltrative changes throughout the pelvis, more prominent on the right.   There is conglomerate soft tissue density in the right hemipelvis measuring approximately 7.1 cm x 3.7 cm favored to at least in part represent fibrosis/post XRT change. There are prominent mesenteric lymph nodes noted in the lower abdomen and upper   pelvis measuring up to 1.1 cm in short axis. Right inguinal adenopathy measures up to 1.1 cm in short axis. A small amount of free fluid is noted in the pelvis. The urinary bladder is largely collapsed and not well evaluated. There is apparent wall   thickening of the urinary bladder. Colonic diverticulosis is noted. There is apparent wall thickening of the cecum. The cecum and terminal ileum lie in the region of the aforementioned conglomerate soft tissue density and are difficult to distinguish   from it. More proximally, there is dilatation of the small bowel suggesting small bowel obstruction. Small bowel loops measure up to 3.5 cm in diameter. No pneumoperitoneum is identified.    Prominent vessels are noted in the anterior lower abdominal and pelvic wall most consistent with collaterals. Portions of the right external and internal iliac artery and vein are surrounded by the conglomerate density previously noted in the right   hemipelvis and are difficult to further evaluate without intravenous  contrast.    There is posttraumatic deformity of the bilateral pubic bones and right SI joint which is abnormally widened, irregular and sclerotic. Sclerosis is noted elsewhere in the sacrum and posterior right iliac bone.          Impression:      Impression:  1.Abnormal soft tissue density centered in the right hemipelvis, nonspecific in appearance but suspected to largely represent fibrosis and/or post XRT changes. Other possibilities such as underlying neoplasm or infection are considered less likely,   however, not definitively excluded.  2.Partial versus early complete small bowel obstruction in this region.  3.Apparent wall thickening of the cecum favored to be secondary to post XRT change although superimposed active inflammation or neoplasm cannot be definitively excluded.  4.Obstruction of the distal right ureter by the above process with resulting severe right hydronephrosis and hydroureter. Severe right renal parenchymal atrophy.  5.Previous hysterectomy  6.Mesentery and right inguinal adenopathy in the lower abdomen and pelvis could be inflammatory or neoplastic in etiology.  7.Multiple vascular collaterals noted in the anterior pelvic wall suggesting some degree of obstruction of the right external and internal iliac artery and veins. Evaluation is limited by the lack of IV contrast.            Electronically Signed: Young Blake MD    10/6/2024 11:03 AM EDT    Workstation ID: [id number]             Assessment:    SBO (small bowel obstruction)      Right hydronephrosis which appears to be chronic      Plan:    Improving creatinine 0.93 down from 1.04.  No acute urological interventions planned.  Urology will sign off.  Call for any questions concerns or clinical changes      Tad Prince MD  [facility]  [facility] [facility], [address]  [place] [address]  Office: [phone]  Available via [employer]  10/07/24  09:01 EDT

## 2024-10-08 ENCOUNTER — INPATIENT HOSPITAL (OUTPATIENT)
Age: 69
End: 2024-10-08
Payer: MEDICARE

## 2024-10-08 ENCOUNTER — APPOINTMENT (OUTPATIENT)
Dept: INTERVENTIONAL RADIOLOGY/VASCULAR | Facility: HOSPITAL | Age: 69
DRG: 988 | End: 2024-10-08
Payer: MEDICARE

## 2024-10-08 ENCOUNTER — INPATIENT HOSPITAL (OUTPATIENT)
Dept: URBAN - METROPOLITAN AREA HOSPITAL 84 | Facility: HOSPITAL | Age: 69
End: 2024-10-08
Payer: MEDICARE

## 2024-10-08 DIAGNOSIS — D72.829 ELEVATED WHITE BLOOD CELL COUNT, UNSPECIFIED: ICD-10-CM

## 2024-10-08 DIAGNOSIS — D50.9 IRON DEFICIENCY ANEMIA, UNSPECIFIED: ICD-10-CM

## 2024-10-08 DIAGNOSIS — R11.0 NAUSEA: ICD-10-CM

## 2024-10-08 DIAGNOSIS — R19.7 DIARRHEA, UNSPECIFIED: ICD-10-CM

## 2024-10-08 DIAGNOSIS — R93.3 ABNORMAL FINDINGS ON DIAGNOSTIC IMAGING OF OTHER PARTS OF DI: ICD-10-CM

## 2024-10-08 LAB
ANION GAP SERPL CALCULATED.3IONS-SCNC: 11.3 MMOL/L (ref 5–15)
BUN SERPL-MCNC: 27 MG/DL (ref 8–23)
BUN/CREAT SERPL: 29.3 (ref 7–25)
CALCIUM SPEC-SCNC: 8.9 MG/DL (ref 8.6–10.5)
CHLORIDE SERPL-SCNC: 105 MMOL/L (ref 98–107)
CO2 SERPL-SCNC: 25.7 MMOL/L (ref 22–29)
CREAT SERPL-MCNC: 0.92 MG/DL (ref 0.57–1)
EGFRCR SERPLBLD CKD-EPI 2021: 68.4 ML/MIN/1.73
GLUCOSE BLDC GLUCOMTR-MCNC: 114 MG/DL (ref 70–105)
GLUCOSE BLDC GLUCOMTR-MCNC: 128 MG/DL (ref 70–105)
GLUCOSE SERPL-MCNC: 123 MG/DL (ref 65–99)
MAGNESIUM SERPL-MCNC: 2.3 MG/DL (ref 1.6–2.4)
PHOSPHATE SERPL-MCNC: 2.9 MG/DL (ref 2.5–4.5)
POTASSIUM SERPL-SCNC: 3.8 MMOL/L (ref 3.5–5.2)
SODIUM SERPL-SCNC: 142 MMOL/L (ref 136–145)

## 2024-10-08 PROCEDURE — 99232 SBSQ HOSP IP/OBS MODERATE 35: CPT | Performed by: NURSE PRACTITIONER

## 2024-10-08 PROCEDURE — 25010000002 ONDANSETRON PER 1 MG: Performed by: INTERNAL MEDICINE

## 2024-10-08 PROCEDURE — 25010000002 HYDRALAZINE PER 20 MG: Performed by: INTERNAL MEDICINE

## 2024-10-08 PROCEDURE — 82948 REAGENT STRIP/BLOOD GLUCOSE: CPT

## 2024-10-08 PROCEDURE — 88333 PATH CONSLTJ SURG CYTO XM 1: CPT | Performed by: PHYSICIAN ASSISTANT

## 2024-10-08 PROCEDURE — 07BH3ZX EXCISION OF RIGHT INGUINAL LYMPHATIC, PERCUTANEOUS APPROACH, DIAGNOSTIC: ICD-10-PCS | Performed by: STUDENT IN AN ORGANIZED HEALTH CARE EDUCATION/TRAINING PROGRAM

## 2024-10-08 PROCEDURE — 99231 SBSQ HOSP IP/OBS SF/LOW 25: CPT | Performed by: SURGERY

## 2024-10-08 PROCEDURE — 25010000002 LIDOCAINE 1 % SOLUTION

## 2024-10-08 PROCEDURE — 82948 REAGENT STRIP/BLOOD GLUCOSE: CPT | Performed by: INTERNAL MEDICINE

## 2024-10-08 PROCEDURE — 76942 ECHO GUIDE FOR BIOPSY: CPT

## 2024-10-08 PROCEDURE — 88334 PATH CONSLTJ SURG CYTO XM EA: CPT | Performed by: PHYSICIAN ASSISTANT

## 2024-10-08 PROCEDURE — 88305 TISSUE EXAM BY PATHOLOGIST: CPT | Performed by: PHYSICIAN ASSISTANT

## 2024-10-08 RX ORDER — LIDOCAINE HYDROCHLORIDE 10 MG/ML
INJECTION, SOLUTION INFILTRATION; PERINEURAL AS NEEDED
Status: COMPLETED | OUTPATIENT
Start: 2024-10-08 | End: 2024-10-08

## 2024-10-08 RX ORDER — HYDROXYZINE HYDROCHLORIDE 25 MG/1
25 TABLET, FILM COATED ORAL EVERY 8 HOURS PRN
Status: DISCONTINUED | OUTPATIENT
Start: 2024-10-08 | End: 2024-10-09 | Stop reason: HOSPADM

## 2024-10-08 RX ADMIN — Medication 10 ML: at 10:57

## 2024-10-08 RX ADMIN — PANTOPRAZOLE SODIUM 40 MG: 40 INJECTION, POWDER, FOR SOLUTION INTRAVENOUS at 17:49

## 2024-10-08 RX ADMIN — HYDRALAZINE HYDROCHLORIDE 10 MG: 20 INJECTION INTRAMUSCULAR; INTRAVENOUS at 05:33

## 2024-10-08 RX ADMIN — ONDANSETRON 4 MG: 2 INJECTION, SOLUTION INTRAMUSCULAR; INTRAVENOUS at 05:18

## 2024-10-08 RX ADMIN — LIDOCAINE HYDROCHLORIDE 7 ML: 10 INJECTION, SOLUTION INFILTRATION; PERINEURAL at 11:28

## 2024-10-08 RX ADMIN — HYDRALAZINE HYDROCHLORIDE 10 MG: 20 INJECTION INTRAMUSCULAR; INTRAVENOUS at 12:24

## 2024-10-08 RX ADMIN — Medication 10 ML: at 21:02

## 2024-10-08 NOTE — PROGRESS NOTES
[facility] MEDICINE SERVICE  DAILY PROGRESS NOTE    NAME: Marilyn Pineda  : 1955  MRN: [id number]      LOS: 2 days     PROVIDER OF SERVICE: Barbara Chinchilla MD    Chief Complaint: SBO (small bowel obstruction)    Subjective:     Interval History:  History taken from: Patient and patient's chart     Continues to have diarrhea but denies any vomiting or abdominal pain         Review of Systems:   Review of Systems  All negative except above   Objective:     Vital Signs  Temp:  [98.1 °F (36.7 °C)-98.6 °F (37 °C)] 98.3 °F (36.8 °C)  Heart Rate:  [78-81] 81  Resp:  [17-20] 19  BP: (147-180)/(82-99) 151/82   Body mass index is 31.92 kg/m².    Physical Exam  Physical Exam  General: Alert and oriented, no acute distress.  HENT: Normocephalic, moist oral mucosa, no scleral icterus.  Neck: Supple, nontender, no carotid bruits, no JVD, no LAD.  Lungs: Clear to auscultation, nonlabored respiration.  Heart: RRR, no murmur, gallop or edema.  Abdomen: Soft, mild epigastric tenderness, nondistended, + bowel sounds.  Musculoskeletal: Normal range of motion and strength, no tenderness or swelling.  Neuro: alert and awake, moving all 4 extremities   Skin: Skin is warm, dry and pink, no rashes or lesions.  Psychiatric: Cooperative, appropriate mood and affect.         Diagnostic Data    Results from last 7 days   Lab Units 10/07/24  2339 10/07/24  0040 10/06/24  0937   WBC 10*3/mm3 12.33*   < > 13.80*   HEMOGLOBIN g/dL 10.4*   < > 10.7*   HEMATOCRIT % 34.1   < > 34.1   PLATELETS 10*3/mm3 582*   < > 578*   GLUCOSE mg/dL 123*   < > 158*   CREATININE mg/dL 0.92   < > 1.04*   BUN mg/dL 27*   < > 27*   SODIUM mmol/L 142   < > 138   POTASSIUM mmol/L 3.8   < > 4.0   AST (SGOT) U/L  --   --  19   ALT (SGPT) U/L  --   --  9   ALK PHOS U/L  --   --  87   BILIRUBIN mg/dL  --   --  0.6   ANION GAP mmol/L 11.3   < > 15.4*    < > = values in this interval not displayed.       FL Small Bowel Follow Through  Single-Contrast    Result Date: 10/7/2024  Impression: No significant small bowel obstruction. There is water-soluble contrast in the colon and rectum on the 4-hour image. 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 Electronically Signed: Jose Luis Murillo MD  10/7/2024 8:13 AM EDT  Workstation ID: [id number]    CT Abdomen Pelvis With Contrast    Result Date: 10/6/2024  Chest- No evidence of metastatic disease. No acute process demonstrated Abdomen/pelvis- 1. There is ill-defined soft tissue in the right side of the pelvis. Finding could represent treatment related changes and fibrosis. Tumor not excluded, in comparison with any outside prior imaging would be helpful with this regard. The abnormality results in right ureteral obstruction which may be longstanding given the presence of cortical atrophy, and right iliac vein obstruction which is associated with collateral formation. 2. Distal small bowel obstruction with transition at the terminal ileum. Thickening of the terminal ileum may relate to radiation enteropathy or fibrosis. This may also account for thickening of the base of the cecum. Wall thickening of the urinary bladder suggests radiation cystitis 3. Abnormal appearance of the right SI joint and adjacent bones. 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 The presence of infection of the SI joint is not excluded 4. Thickening of the right iliopsoas muscle may be treatment related or secondary to adjacent SI joint infection if present 5. 3 mm indeterminate liver lesion, likely cyst. Follow-up recommended 6. Mild mesenteric adenopathy, and enlarged right groin lymph nodes Electronically Signed: Abhishek Huang  10/6/2024 3:44 PM EDT  Workstation ID: [id number]    CT Chest With Contrast Diagnostic    Result Date: 10/6/2024  Chest- No  evidence of metastatic disease. No acute process demonstrated Abdomen/pelvis- 1. There is ill-defined soft tissue in the right side of the pelvis. Finding could represent treatment related changes and fibrosis. Tumor not excluded, in comparison with any outside prior imaging would be helpful with this regard. The abnormality results in right ureteral obstruction which may be longstanding given the presence of cortical atrophy, and right iliac vein obstruction which is associated with collateral formation. 2. Distal small bowel obstruction with transition at the terminal ileum. Thickening of the terminal ileum may relate to radiation enteropathy or fibrosis. This may also account for thickening of the base of the cecum. Wall thickening of the urinary bladder suggests radiation cystitis 3. Abnormal appearance of the right SI joint and adjacent bones. 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 The presence of infection of the SI joint is not excluded 4. Thickening of the right iliopsoas muscle may be treatment related or secondary to adjacent SI joint infection if present 5. 3 mm indeterminate liver lesion, likely cyst. Follow-up recommended 6. Mild mesenteric adenopathy, and enlarged right groin lymph nodes Electronically Signed: Abhishek Huang  10/6/2024 3:44 PM EDT  Workstation ID: [id number]    CT Head With & Without Contrast    Result Date: 10/6/2024  Impression: No acute intracranial pathology. No abnormal intracranial enhancement. Electronically Signed: Alejandro Baugh MD  10/6/2024 3:20 PM EDT  Workstation ID: [id number]       I have reviewed patient labs and imaging     Assessment/Plan:     Active and Resolved Problems  # Nausea and vomiting secondary to ? SBO  # Abdominal soft tissue density in right hemipelvis, malignancy to exclude  #Severe right-sided hydronephrosis  -Patient presented with nausea and vomiting - brown / blackish color   -CTAP with partial versus early complete small bowel  obstruction.  -Patient has abnormal soft tissue density in right hemipelvis as well.  -Right severe hydronephrosis present  -there is wall thickening of the cecum favored to be secondary to post XRT change although superimposed active inflammation or neoplasm cannot be definitively excluded.   -GI recommendations noted- Okay to advance diet later today from GI standpoint after lymph node biopsy. Not much else to add from a GI standpoint as an inpatient at this time. Recommend proceeding with outpatient colonoscopy as scheduled on 10/17/2024.   -Surgery recommendations appreciated- no intervention  -oncology recommendations noted- rec biopsy of enlarged lymph nodes in the right groin in the setting of prior history of cervical cancer can secondary to chronic lymphedema from radiation causing fibrosis and compression of vessels    -Urology recommendations noted- no intervention     VTE Prophylaxis:  Mechanical VTE prophylaxis orders are present.             Disposition Planning:     Barriers to Discharge:medical clearance   Anticipated Date of Discharge: 10/09  Place of Discharge: home      Time: 40 minutes     There are no questions and answers to display.       Signature: Electronically signed by Barbara Chinchilla MD, 10/08/24, 10:38 EDT.  [facility] Hospitalist Team

## 2024-10-08 NOTE — PLAN OF CARE
Goal Outcome Evaluation:  Plan of Care Reviewed With: patient                           Pt A&O x4. Ambulated outside of room. Pt tolerating diet well. No complaints of pain. Call light within reach. Plan of care ongoing.

## 2024-10-08 NOTE — PLAN OF CARE
Goal Outcome Evaluation: Pt tolerated clear liquids but NPO at MN for biopsy of right groin. Denies pain. Pt reported having multiple bowel movement yesterday, Plan of care ongoing.

## 2024-10-08 NOTE — NURSING NOTE
Pt received hydralazine 10 mg IV twice this shift for elevated b/p. Appears that pt blood pressure has been elevated since admission

## 2024-10-08 NOTE — CASE MANAGEMENT/SOCIAL WORK
Continued Stay Note  [facility]     Patient Name: Marilyn Pineda  MRN: [id number]  Today's Date: 10/8/2024    Admit Date: 10/6/2024    Plan: Anticipate home with spouse.  Current OP PT [facility].   Discharge Plan       Row Name 10/08/24 1518       Plan    Plan Anticipate home with spouse.  Current OP PT [facility].    Plan Comments DC barriers: Pending IR bx results.               Expected Discharge Date and Time       Expected Discharge Date Expected Discharge Time    Oct 9, 2024           Samantha Krishna RN    [facility]  [email or web address]  Office [phone]  Cell [phone]

## 2024-10-08 NOTE — PROGRESS NOTES
LOS: 2 days   Patient Care Team:  Cinthia Brown MD as PCP - General (Internal Medicine & Pediatrics)  Patrick Leblanc APRN (Obstetrics and Gynecology)      Subjective     Interval History:     Subjective: Patient reports multiple bowel movements following small bowel follow-through yesterday.  No overt GI bleeding.  Denies abdominal pain.  Did have some nausea yesterday evening, but no vomiting.      ROS:   No chest pain, shortness of breath, or cough.        Medication Review:     Current Facility-Administered Medications:     acetaminophen (TYLENOL) tablet 650 mg, 650 mg, Oral, Q4H PRN **OR** acetaminophen (TYLENOL) 160 MG/5ML oral solution 650 mg, 650 mg, Oral, Q4H PRN **OR** acetaminophen (TYLENOL) suppository 650 mg, 650 mg, Rectal, Q4H PRNJadon Yadana, MD    aluminum-magnesium hydroxide-simethicone (MAALOX MAX) 400-400-40 MG/5ML suspension 15 mL, 15 mL, Oral, Q6H PRJadon QUINTANILLA Yadana, MD    Calcium Replacement - Follow Nurse / BPA Driven Protocol, , Does not apply, Jadon YOUNG Yadana, MD    dextrose (D50W) (25 g/50 mL) IV injection 25 g, 25 g, Intravenous, Q15 Min PRJadon QUINTANILLA Yadana, MD, 25 g at 10/07/24 1804    dextrose (GLUTOSE) oral gel 15 g, 15 g, Oral, Q15 Min PRJadon QUINTANILLA Yadana, MD    glucagon (GLUCAGEN) injection 1 mg, 1 mg, Intramuscular, Q15 Min PRJadon QUINTANILLA Yadana, MD    hydrALAZINE (APRESOLINE) injection 10 mg, 10 mg, Intravenous, Q6H PRJadon QUINTANILLA Yadana, MD, 10 mg at 10/08/24 0533    Magnesium Standard Dose Replacement - Follow Nurse / BPA Driven Protocol, , Does not apply, Jadon YOUNG Yadana, MD    melatonin tablet 5 mg, 5 mg, Oral, Nightly PRNJadon Yadana, MD    nitroglycerin (NITROSTAT) SL tablet 0.4 mg, 0.4 mg, Sublingual, Q5 Min PRNJadon Yadana, MD    ondansetron ODT (ZOFRAN-ODT) disintegrating tablet 4 mg, 4 mg, Oral, Q6H PRN **OR** ondansetron (ZOFRAN) injection 4 mg, 4 mg, Intravenous, Q6H PRN, OReginald phelps MD, 4 mg at 10/08/24 0518    pantoprazole (PROTONIX) injection 40 mg, 40 mg, Intravenous, BID AC,  Reginald Diop MD, 40 mg at 10/08/24 0724    Phosphorus Replacement - Follow Nurse / BPA Driven Protocol, , Does not apply, Jadon YOUNG Yadana, MD    Potassium Replacement - Follow Nurse / BPA Driven Protocol, , Does not apply, Jadon YOUNG Yadana, MD    prochlorperazine (COMPAZINE) injection 2.5 mg, 2.5 mg, Intravenous, Q6H PRN, Reginald Diop MD, 2.5 mg at 10/07/24 0616    [COMPLETED] Insert Peripheral IV, , , Once **AND** sodium chloride 0.9 % flush 10 mL, 10 mL, Intravenous, PRN, Elan Porter MD    sodium chloride 0.9 % flush 10 mL, 10 mL, Intravenous, Q12H, Reginald Diop MD, 10 mL at 10/07/24 2144    sodium chloride 0.9 % flush 10 mL, 10 mL, Intravenous, PRN, Reginald Diop MD    sodium chloride 0.9 % infusion, 75 mL/hr, Intravenous, Continuous, Reginald Diop MD, Last Rate: 75 mL/hr at 10/07/24 2146, 75 mL/hr at 10/07/24 2146      Objective     Vital Signs  Vitals:    10/07/24 2122 10/07/24 2230 10/08/24 0523 10/08/24 0607   BP: 180/99 154/83 176/98 151/82   BP Location: Left arm Left leg Left arm    Patient Position: Lying Lying Lying    Pulse: 78  81    Resp: 17  19    Temp: 98.1 °F (36.7 °C)  98.3 °F (36.8 °C)    TempSrc: Oral  Oral    SpO2: 99%  99%    Weight:       Height:           Physical Exam:     General Appearance:    Awake and alert, in no acute distress   Head:    Normocephalic, without obvious abnormality   Eyes:          Conjunctivae normal, anicteric sclera   Throat:   No oral lesions, no thrush, oral mucosa moist   Neck:   No adenopathy, supple, no JVD   Lungs:     respirations regular, even and unlabored   Abdomen:     Soft, non-tender, no rebound or guarding, non-distended   Rectal:     Deferred   Extremities:   No edema, no cyanosis   Skin:   No bruising or rash, no jaundice        Results Review:    CBC    Results from last 7 days   Lab Units 10/07/24  2339 10/07/24  0040 10/06/24  0937 10/01/24  1317   WBC 10*3/mm3 12.33* 16.21* 13.80* 10.79   HEMOGLOBIN g/dL 10.4* 9.7* 10.7* 10.8*   PLATELETS 10*3/mm3  582* 515* 578* 489*     CMP   Results from last 7 days   Lab Units 10/07/24  2339 10/07/24  0040 10/06/24  0937 10/01/24  1317   SODIUM mmol/L 142 140 138 140   POTASSIUM mmol/L 3.8 4.1 4.0 4.5   CHLORIDE mmol/L 105 102 95* 101   CO2 mmol/L 25.7 27.8 27.6 28.0   BUN mg/dL 27* 31* 27* 16   CREATININE mg/dL 0.92 0.93 1.04* 1.03*   GLUCOSE mg/dL 123* 176* 158* 92   ALBUMIN g/dL  --   --  4.0 4.0   BILIRUBIN mg/dL  --   --  0.6 0.4   ALK PHOS U/L  --   --  87 82   AST (SGOT) U/L  --   --  19 16   ALT (SGPT) U/L  --   --  9 12   MAGNESIUM mg/dL 2.3 2.7*  --   --    PHOSPHORUS mg/dL 2.9 5.3*  --   --    LIPASE U/L  --   --  11*  --      Cr Clearance Estimated Creatinine Clearance: 64.6 mL/min (by C-G formula based on SCr of 0.92 mg/dL).  Coag     HbA1C   Lab Results   Component Value Date    HGBA1C 5.20 05/24/2024         Infection     UA    Results from last 7 days   Lab Units 10/06/24  0948   NITRITE UA  Negative   WBC UA /HPF 3-5*   BACTERIA UA /HPF None Seen   SQUAM EPITHEL UA /HPF 13-20*     Microbiology Results (last 10 days)       Procedure Component Value - Date/Time    COVID-19 and FLU A/B PCR, 1 HR TAT - Swab, Nasopharynx [[id number]]  (Normal) Collected: 10/06/24 0937    Lab Status: Final result Specimen: Swab from Nasopharynx Updated: 10/06/24 1006     COVID19 Not Detected     Influenza A PCR Not Detected     Influenza B PCR Not Detected    Narrative:      Fact sheet for providers: https://www.fda.gov/media/358013/download    Fact sheet for patients: https://www.fda.gov/media/304202/download    Test performed by PCR.          Imaging Results (Last 72 Hours)       Procedure Component Value Units Date/Time    FL Small Bowel Follow Through Single-Contrast [[id number]] Collected: 10/07/24 1646     Updated: 10/07/24 1658    Narrative:      FL SMALL BOWEL FOLLOW THROUGH SINGLE-CONTRAST    Date of Exam: 10/7/2024 12:06 PM EDT    Indication: Abdominal pain, abnormal CT of the abdomen and pelvis, small bowel  obstruction..    Comparison: CT of the abdomen and pelvis performed on 10/6/2024.    Technique:   image of the abdomen was obtained. The patient ingested water-soluble contrast for single contrast imaging of the small bowel.  Examination was recorded with multiple large field of view radiographs.    Fluoroscopic Time: None used.    Number of Images: 5    Findings:  Preliminary  KUB was obtained. There is no contrast material in the abdomen and pelvis which would preclude the patient from undergoing a small bowel study. There is increased stool burden. There are surgical clips projecting over the lumbar spine   and upper sacrum. There is deformity of the pubic symphysis and sclerotic changes in the right iliac bone in the medial right and left iliac bones better demonstrated on the CT exam. There are underlying degenerative changes.    On the 0-minute film, there is water-soluble contrast within the stomach, duodenum, and proximal jejunum. There is contrast seen within the colon and rectum on the 4-hour delayed image. This would indicate no significant small bowel obstruction.      Impression:      Impression:  No significant small bowel obstruction. There is water-soluble contrast in the colon and rectum on the 4-hour image.      Electronically Signed: Lowell Walden MD    10/7/2024 4:56 PM EDT    Workstation ID: [id number]    XR Skull < 4 View [[id number]] Collected: 10/07/24 0800     Updated: 10/07/24 0815    Narrative:      XR SKULL < 4 VW    Date of Exam: 10/6/2024 8:20 PM EDT    Indication: hard bump on back of head    Comparison: CT head without contrast 10/6/2024    Findings:  There is no lytic or suspicious appearing osseous lesion. There is a benign osteoma/exostosis which projects from the right posterior occipital region measuring 2.6 x 1.7 cm as previously described on prior head CT.      Impression:      Impression:  Benign right posterior occipital osteoma/exostosis measuring 2.6 x 1.7  cm.        Electronically Signed: Jose Luis Murillo MD    10/7/2024 8:13 AM EDT    Workstation ID: [id number]    CT Abdomen Pelvis With Contrast [[id number]] Collected: 10/06/24 1526     Updated: 10/06/24 1546    Narrative:      CT CHEST W CONTRAST DIAGNOSTIC, CT ABDOMEN PELVIS W CONTRAST    Date of Exam: 10/6/2024 2:55 PM EDT    Indication: ? Neoplastic.    Comparison: CT abdomen pelvis 10/6/2024    Technique: Axial CT images were obtained of the chest after the uneventful intravenous administration of iodinated contrast.  Sagittal and coronal reconstructions were performed.  Automated exposure control and iterative reconstruction methods were used.      Findings:        Chest:    Kat/mediastinum: No adenopathy. Thoracic aorta normal in caliber. Minimal coronary calcification. No pericardial effusion    Lungs/pleura: No suspicious pulmonary nodule. Calcified granulomas on the right. No active pulmonary process. No pleural effusion or pleural tumor    Bones/soft tissues: No axillary adenopathy. No suspicious bone lesion      Abdomen/Pelvis:    Organs: 3 mm indeterminate lesion in the inferior liver (6/39). Focal fatty infiltration in the left hepatic lobe. Spleen, pancreas, adrenal glands unremarkable. Severe right-sided hydroureteronephrosis to just beyond the pelvic inlet where there is   obstruction caused by pelvic soft tissue described below. Right renal cortical thinning. No hydronephrosis on the left. Left renal cysts. Contracted gallbladder    Gastrointestinal: Dilatation of the stomach and small bowel to the pelvis there is wall thickening of approximately 5 cm of the terminal ileum, with intestinal dilatation beginning proximal to this point. There is also wall thickening of the base of the   cecum. Colon is normal in caliber. Sigmoid diverticula are noted. Several enhancing mesenteric lymph nodes are present (79, 91, 101), measuring up to 1.3 cm (91)    Pelvis: Wall thickening of the urinary bladder. Uterus  surgically absent. Ill-defined soft tissue in the right side of the upper pelvis. Ill-defined soft tissue causes obstruction of the right external and internal iliac veins. Iliac artery is patent.   There is thickening of the right iliopsoas muscle.    Peritoneum/Retroperitoneum: No ascites. No retroperitoneal adenopathy. Normal caliber aorta.    Bones/Soft Tissues: Widening of the right SI joint with erosive changes and some gas in the widened joint space. Sclerosis of the adjacent ilium and sacrum. Old pelvic bone fractures. Abdominal wall collaterals. Enlarged right groin lymph nodes up to 2.8   cm (129).      Impression:      Chest-  No evidence of metastatic disease. No acute process demonstrated    Abdomen/pelvis-  1. There is ill-defined soft tissue in the right side of the pelvis. Finding could represent treatment related changes and fibrosis. Tumor not excluded, in comparison with any outside prior imaging would be helpful with this regard. The abnormality   results in right ureteral obstruction which may be longstanding given the presence of cortical atrophy, and right iliac vein obstruction which is associated with collateral formation.  2. Distal small bowel obstruction with transition at the terminal ileum. Thickening of the terminal ileum may relate to radiation enteropathy or fibrosis. This may also account for thickening of the base of the cecum. Wall thickening of the urinary   bladder suggests radiation cystitis  3. Abnormal appearance of the right SI joint and adjacent bones. 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 The presence of infection of the SI joint is not excluded  4. Thickening of the right iliopsoas muscle may be treatment related or secondary to adjacent SI joint infection if present  5. 3 mm indeterminate liver lesion, likely cyst. Follow-up recommended  6. Mild mesenteric adenopathy, and enlarged right groin lymph nodes          Electronically Signed: Abhishek Huang     10/6/2024 3:44 PM EDT    Workstation ID: [id number]    CT Chest With Contrast Diagnostic [[id number]] Collected: 10/06/24 1526     Updated: 10/06/24 1546    Narrative:      CT CHEST W CONTRAST DIAGNOSTIC, CT ABDOMEN PELVIS W CONTRAST    Date of Exam: 10/6/2024 2:55 PM EDT    Indication: ? Neoplastic.    Comparison: CT abdomen pelvis 10/6/2024    Technique: Axial CT images were obtained of the chest after the uneventful intravenous administration of iodinated contrast.  Sagittal and coronal reconstructions were performed.  Automated exposure control and iterative reconstruction methods were used.      Findings:        Chest:    Kat/mediastinum: No adenopathy. Thoracic aorta normal in caliber. Minimal coronary calcification. No pericardial effusion    Lungs/pleura: No suspicious pulmonary nodule. Calcified granulomas on the right. No active pulmonary process. No pleural effusion or pleural tumor    Bones/soft tissues: No axillary adenopathy. No suspicious bone lesion      Abdomen/Pelvis:    Organs: 3 mm indeterminate lesion in the inferior liver (6/39). Focal fatty infiltration in the left hepatic lobe. Spleen, pancreas, adrenal glands unremarkable. Severe right-sided hydroureteronephrosis to just beyond the pelvic inlet where there is   obstruction caused by pelvic soft tissue described below. Right renal cortical thinning. No hydronephrosis on the left. Left renal cysts. Contracted gallbladder    Gastrointestinal: Dilatation of the stomach and small bowel to the pelvis there is wall thickening of approximately 5 cm of the terminal ileum, with intestinal dilatation beginning proximal to this point. There is also wall thickening of the base of the   cecum. Colon is normal in caliber. Sigmoid diverticula are noted. Several enhancing mesenteric lymph nodes are present (79, 91, 101), measuring up to 1.3 cm (91)    Pelvis: Wall thickening of the urinary bladder. Uterus surgically absent. Ill-defined soft tissue in the right  side of the upper pelvis. Ill-defined soft tissue causes obstruction of the right external and internal iliac veins. Iliac artery is patent.   There is thickening of the right iliopsoas muscle.    Peritoneum/Retroperitoneum: No ascites. No retroperitoneal adenopathy. Normal caliber aorta.    Bones/Soft Tissues: Widening of the right SI joint with erosive changes and some gas in the widened joint space. Sclerosis of the adjacent ilium and sacrum. Old pelvic bone fractures. Abdominal wall collaterals. Enlarged right groin lymph nodes up to 2.8   cm (129).      Impression:      Chest-  No evidence of metastatic disease. No acute process demonstrated    Abdomen/pelvis-  1. There is ill-defined soft tissue in the right side of the pelvis. Finding could represent treatment related changes and fibrosis. Tumor not excluded, in comparison with any outside prior imaging would be helpful with this regard. The abnormality   results in right ureteral obstruction which may be longstanding given the presence of cortical atrophy, and right iliac vein obstruction which is associated with collateral formation.  2. Distal small bowel obstruction with transition at the terminal ileum. Thickening of the terminal ileum may relate to radiation enteropathy or fibrosis. This may also account for thickening of the base of the cecum. Wall thickening of the urinary   bladder suggests radiation cystitis  3. Abnormal appearance of the right SI joint and adjacent bones. 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 The presence of infection of the SI joint is not excluded  4. Thickening of the right iliopsoas muscle may be treatment related or secondary to adjacent SI joint infection if present  5. 3 mm indeterminate liver lesion, likely cyst. Follow-up recommended  6. Mild mesenteric adenopathy, and enlarged right groin lymph nodes          Electronically Signed: Abhishek Huang    10/6/2024 3:44 PM EDT    Workstation ID: [id number]    CT Head  With & Without Contrast [[id number]] Collected: 10/06/24 1518     Updated: 10/06/24 1522    Narrative:      CT HEAD W WO CONTRAST    Date of Exam: 10/6/2024 2:55 PM EDT    Indication: Metastasis ?.    Comparison: None available.    Technique: Axial CT images were obtained of the head before and after the uneventful intravenous administration of iodinated contrast.  Sagittal and coronal reconstructions were performed.  Automated exposure control and iterative reconstruction methods   were used.    Findings: Gray-white matter differentiation is maintained without evidence of an acute infarction. No intracranial mass or mass effect. No extra-axial mass or collection. The ventricles and sulci are normal in size and configuration. The posterior fossa   appears normal. Sellar and suprasellar structures are normal. No abnormal intracranial enhancement.    Orbital and paranasal soft tissues are normal. The paranasal sinuses, ethmoid air cells, and mastoid air cells are aerated. The bony calvarium appears intact. No acute fractures. No lytic or blastic bony diseases. Right occipital benign osteoma.      Impression:      Impression: No acute intracranial pathology. No abnormal intracranial enhancement.          Electronically Signed: Alejandro Baugh MD    10/6/2024 3:20 PM EDT    Workstation ID: [id number]    CT Abdomen Pelvis Without Contrast [[id number]] Collected: 10/06/24 1043     Updated: 10/06/24 1105    Narrative:      CT ABDOMEN PELVIS WO CONTRAST    Date of Exam: 10/6/2024 10:04 AM EDT    Indication: Abdominal pain some vomiting and diarrhea.    Comparison: None available    Technique: Axial CT images were obtained of the abdomen and pelvis without the administration of contrast. Sagittal and coronal reconstructions were performed.  Automated exposure control and iterative reconstruction methods were used.      Findings:  The lung bases are clear bilaterally. Fluid is noted in the distal thoracic esophagus suggesting  reflux.    The liver, spleen, and pancreas appear unremarkable. The adrenal glands appear normal bilaterally. The left kidney appears normal.     There is severe right renal parenchymal atrophy. There is severe right hydronephrosis and hydroureter down to the mid pelvic level. A hysterectomy has been performed. There are infiltrative changes throughout the pelvis, more prominent on the right.   There is conglomerate soft tissue density in the right hemipelvis measuring approximately 7.1 cm x 3.7 cm favored to at least in part represent fibrosis/post XRT change. There are prominent mesenteric lymph nodes noted in the lower abdomen and upper   pelvis measuring up to 1.1 cm in short axis. Right inguinal adenopathy measures up to 1.1 cm in short axis. A small amount of free fluid is noted in the pelvis. The urinary bladder is largely collapsed and not well evaluated. There is apparent wall   thickening of the urinary bladder. Colonic diverticulosis is noted. There is apparent wall thickening of the cecum. The cecum and terminal ileum lie in the region of the aforementioned conglomerate soft tissue density and are difficult to distinguish   from it. More proximally, there is dilatation of the small bowel suggesting small bowel obstruction. Small bowel loops measure up to 3.5 cm in diameter. No pneumoperitoneum is identified.    Prominent vessels are noted in the anterior lower abdominal and pelvic wall most consistent with collaterals. Portions of the right external and internal iliac artery and vein are surrounded by the conglomerate density previously noted in the right   hemipelvis and are difficult to further evaluate without intravenous contrast.    There is posttraumatic deformity of the bilateral pubic bones and right SI joint which is abnormally widened, irregular and sclerotic. Sclerosis is noted elsewhere in the sacrum and posterior right iliac bone.          Impression:      Impression:  1.Abnormal soft tissue  density centered in the right hemipelvis, nonspecific in appearance but suspected to largely represent fibrosis and/or post XRT changes. Other possibilities such as underlying neoplasm or infection are considered less likely,   however, not definitively excluded.  2.Partial versus early complete small bowel obstruction in this region.  3.Apparent wall thickening of the cecum favored to be secondary to post XRT change although superimposed active inflammation or neoplasm cannot be definitively excluded.  4.Obstruction of the distal right ureter by the above process with resulting severe right hydronephrosis and hydroureter. Severe right renal parenchymal atrophy.  5.Previous hysterectomy  6.Mesentery and right inguinal adenopathy in the lower abdomen and pelvis could be inflammatory or neoplastic in etiology.  7.Multiple vascular collaterals noted in the anterior pelvic wall suggesting some degree of obstruction of the right external and internal iliac artery and veins. Evaluation is limited by the lack of IV contrast.            Electronically Signed: Young Blake MD    10/6/2024 11:03 AM EDT    Workstation ID: [id number]            Assessment & Plan     ASSESSMENT:  -Nausea/vomiting/diarrhea  -Abnormal CT suggesting distal small bowel obstruction with transition point, thickening of cecum  -Abnormal appearance right SI joint and adjacent bones on CT  -Indeterminate liver lesion on CT  -Right sided hydronephrosis  -Leukocytosis  -Iron deficiency anemia  -History of cervical cancer status post radiation and hysterectomy 1993    PLAN:  Patient is a 68-year-old female admitted 10/6/2024 with nausea/vomiting/diarrhea. Found to have abnormal CT suggesting distal small bowel obstruction with transition point and thickening of the cecum along with other abnormal findings as above.  Markedly abnormal CT with contrast on admission showing ill-defined soft tissue in the right side of the pelvis, distal small bowel obstruction  with transition point at the TI, thickening of the TI, abnormal appearance of the right SI joint and adjacent bones possibly due to radiation osteonecrosis, thickening of the right iliopsoas muscle, and a 3 mm indeterminant liver lesion with mild mesenteric adenopathy.    Patient reports that she is feeling some better today.  Has had multiple bowel movements overnight following SBFT.  SBFT shows no significant small bowel obstruction with contrast in the colon and rectum at 4 hours.  Plan for lymph node biopsy.  Await pathology.  Recommend proceeding with outpatient colonoscopy as scheduled on 10/17/2024.  WBC count 12.33 from 16.21.  Creatinine normal.  Hemoglobin 10.4 from 9.7.  Urology and oncology also following.  Okay to advance diet later today from GI standpoint after lymph node biopsy.  Not much else to add from a GI standpoint as an inpatient at this time.  Patient can follow-up in our office after colonoscopy.  Will be available as needed.      Electronically signed by HANNAH Gonsalves, 10/08/24, 9:32 AM EDT.

## 2024-10-09 ENCOUNTER — READMISSION MANAGEMENT (OUTPATIENT)
Dept: CALL CENTER | Facility: HOSPITAL | Age: 69
End: 2024-10-09
Payer: MEDICARE

## 2024-10-09 ENCOUNTER — TELEPHONE (OUTPATIENT)
Dept: ONCOLOGY | Facility: CLINIC | Age: 69
End: 2024-10-09

## 2024-10-09 VITALS
TEMPERATURE: 98.1 F | RESPIRATION RATE: 16 BRPM | BODY MASS INDEX: 31.96 KG/M2 | HEIGHT: 65 IN | OXYGEN SATURATION: 96 % | SYSTOLIC BLOOD PRESSURE: 147 MMHG | HEART RATE: 76 BPM | DIASTOLIC BLOOD PRESSURE: 74 MMHG | WEIGHT: 191.8 LBS

## 2024-10-09 LAB
ANION GAP SERPL CALCULATED.3IONS-SCNC: 10.4 MMOL/L (ref 5–15)
BASOPHILS # BLD AUTO: 0.05 10*3/MM3 (ref 0–0.2)
BASOPHILS NFR BLD AUTO: 0.4 % (ref 0–1.5)
BUN SERPL-MCNC: 23 MG/DL (ref 8–23)
BUN/CREAT SERPL: 25.3 (ref 7–25)
CALCIUM SPEC-SCNC: 8.2 MG/DL (ref 8.6–10.5)
CHLORIDE SERPL-SCNC: 108 MMOL/L (ref 98–107)
CO2 SERPL-SCNC: 24.6 MMOL/L (ref 22–29)
CREAT SERPL-MCNC: 0.91 MG/DL (ref 0.57–1)
DEPRECATED RDW RBC AUTO: 48.7 FL (ref 37–54)
EGFRCR SERPLBLD CKD-EPI 2021: 69.3 ML/MIN/1.73
EOSINOPHIL # BLD AUTO: 0.32 10*3/MM3 (ref 0–0.4)
EOSINOPHIL NFR BLD AUTO: 2.7 % (ref 0.3–6.2)
ERYTHROCYTE [DISTWIDTH] IN BLOOD BY AUTOMATED COUNT: 15.9 % (ref 12.3–15.4)
GLUCOSE SERPL-MCNC: 184 MG/DL (ref 65–99)
HCT VFR BLD AUTO: 28.2 % (ref 34–46.6)
HGB BLD-MCNC: 8.8 G/DL (ref 12–15.9)
IMM GRANULOCYTES # BLD AUTO: 0.1 10*3/MM3 (ref 0–0.05)
IMM GRANULOCYTES NFR BLD AUTO: 0.8 % (ref 0–0.5)
LAB AP CASE REPORT: NORMAL
LAB AP DIAGNOSIS COMMENT: NORMAL
LYMPHOCYTES # BLD AUTO: 0.95 10*3/MM3 (ref 0.7–3.1)
LYMPHOCYTES NFR BLD AUTO: 8.1 % (ref 19.6–45.3)
Lab: NORMAL
Lab: NORMAL
MAGNESIUM SERPL-MCNC: 2 MG/DL (ref 1.6–2.4)
MCH RBC QN AUTO: 26.4 PG (ref 26.6–33)
MCHC RBC AUTO-ENTMCNC: 31.2 G/DL (ref 31.5–35.7)
MCV RBC AUTO: 84.7 FL (ref 79–97)
MONOCYTES # BLD AUTO: 0.86 10*3/MM3 (ref 0.1–0.9)
MONOCYTES NFR BLD AUTO: 7.3 % (ref 5–12)
NEUTROPHILS NFR BLD AUTO: 80.7 % (ref 42.7–76)
NEUTROPHILS NFR BLD AUTO: 9.52 10*3/MM3 (ref 1.7–7)
NRBC BLD AUTO-RTO: 0 /100 WBC (ref 0–0.2)
PATH REPORT.FINAL DX SPEC: NORMAL
PATH REPORT.GROSS SPEC: NORMAL
PHOSPHATE SERPL-MCNC: 2.6 MG/DL (ref 2.5–4.5)
PLATELET # BLD AUTO: 420 10*3/MM3 (ref 140–450)
PMV BLD AUTO: 8.4 FL (ref 6–12)
POTASSIUM SERPL-SCNC: 3.7 MMOL/L (ref 3.5–5.2)
RBC # BLD AUTO: 3.33 10*6/MM3 (ref 3.77–5.28)
SODIUM SERPL-SCNC: 143 MMOL/L (ref 136–145)
WBC NRBC COR # BLD AUTO: 11.8 10*3/MM3 (ref 3.4–10.8)

## 2024-10-09 PROCEDURE — 84100 ASSAY OF PHOSPHORUS: CPT | Performed by: INTERNAL MEDICINE

## 2024-10-09 PROCEDURE — 99232 SBSQ HOSP IP/OBS MODERATE 35: CPT | Performed by: STUDENT IN AN ORGANIZED HEALTH CARE EDUCATION/TRAINING PROGRAM

## 2024-10-09 PROCEDURE — 83735 ASSAY OF MAGNESIUM: CPT | Performed by: INTERNAL MEDICINE

## 2024-10-09 PROCEDURE — 85025 COMPLETE CBC W/AUTO DIFF WBC: CPT | Performed by: INTERNAL MEDICINE

## 2024-10-09 PROCEDURE — 80048 BASIC METABOLIC PNL TOTAL CA: CPT | Performed by: INTERNAL MEDICINE

## 2024-10-09 RX ORDER — PANTOPRAZOLE SODIUM 40 MG/1
40 TABLET, DELAYED RELEASE ORAL DAILY
Qty: 30 TABLET | Refills: 0 | Status: SHIPPED | OUTPATIENT
Start: 2024-10-09 | End: 2024-10-17

## 2024-10-09 RX ORDER — ONDANSETRON 4 MG/1
4 TABLET, ORALLY DISINTEGRATING ORAL EVERY 6 HOURS PRN
Qty: 20 TABLET | Refills: 0 | Status: SHIPPED | OUTPATIENT
Start: 2024-10-09 | End: 2024-10-14

## 2024-10-09 RX ADMIN — Medication 10 ML: at 08:46

## 2024-10-09 RX ADMIN — PANTOPRAZOLE SODIUM 40 MG: 40 INJECTION, POWDER, FOR SOLUTION INTRAVENOUS at 07:49

## 2024-10-09 NOTE — DISCHARGE SUMMARY
"             [facility] Medicine Services  Discharge Summary    Date of Service: 10/9/2024  Patient Name: Marilyn Pineda  : 1955  MRN: [id number]    Date of Admission: 10/6/2024  Discharge Diagnosis: SBO (small bowel obstruction)  Date of Discharge: 10/9/2024  Primary Care Physician: Cinthia Brown MD      Presenting Problem:   Pelvic mass [R19.00]  SBO (small bowel obstruction) [K56.609]  Hydronephrosis of right kidney [N13.30]  Nausea and vomiting, unspecified vomiting type [R11.2]    Active and Resolved Hospital Problems:  Active Hospital Problems    Diagnosis POA    **SBO (small bowel obstruction) [K56.609] Yes      Resolved Hospital Problems   No resolved problems to display.         Hospital Course     HPI:    \"A 68 y.o. old female patient with PMH of cervical cancer status post radiation and hysterectomy in  presents to the hospital with complaints of nausea and vomiting.  Patient is vomiting brown/blackish color vomitus.  Last time bowel movement is yesterday and patient has been having diarrhea for past 1 week.  Patient labs with mild leukocytosis.  Creatinine of 1.  CT abdominal pelvis without contrast showing there is abnormal soft tissue density in right hemipelvis can be fibrosis and or post radiation changesAbnormal soft tissue density centered in the right hemipelvis, nonspecific in appearance but suspected to largely represent fibrosis and/or post XRT changes. Other possibilities such as underlying neoplasm or infection are considered less likely, however, not definitively excluded.Partial versus early complete small bowel obstruction in this region.  Apparent wall thickening of the cecum favored to be secondary to post XRT change although superimposed active inflammation or neoplasm cannot be definitively excluded.Obstruction of the distal right ureter by the above process with resulting severe right hydronephrosis and hydroureter. Severe right renal parenchymal " "atrophy.Mesentery and right inguinal adenopathy in the lower abdomen and pelvis could be inflammatory or neoplastic in etiology.Multiple vascular collaterals noted in the anterior pelvic wall suggesting some degree of obstruction of the right external and internal iliac artery and veins. Evaluation is limited by the lack of IV contrast.  Will get CT abdomen and pelvis with contrast.  GI general surgery heme-onc consulted.\"    Hospital Course:  # Nausea and vomiting secondary to ? SBO  # Abdominal soft tissue density in right hemipelvis, malignancy to exclude  #Severe right-sided hydronephrosis  -Patient presented with nausea and vomiting - brown / blackish color   -CTAP with partial versus early complete small bowel obstruction.  -Patient has abnormal soft tissue density in right hemipelvis as well.  -Right severe hydronephrosis present  -there is wall thickening of the cecum favored to be secondary to post XRT change although superimposed active inflammation or neoplasm cannot be definitively excluded.   -GI recommendations noted- Okay to advance diet later today from GI standpoint after lymph node biopsy. Not much else to add from a GI standpoint as an inpatient at this time. Recommend proceeding with outpatient colonoscopy as scheduled on 10/17/2024.   -Surgery recommendations appreciated- no intervention  -oncology recommendations noted- rec biopsy of enlarged lymph nodes in the right groin in the setting of prior history of cervical cancer can secondary to chronic lymphedema from radiation causing fibrosis and compression of vessels  . Follow up outpatient to discuss pathology report   -Urology recommendations noted- no intervention         DISCHARGE Follow Up Recommendations for labs and diagnostics:   Follow up with Oncology in 1 week to discuss tissue biopsy report.  Follow up with Gastroenterology, Surgery as outpatient.  Follow up with Primary care provider within 3 daysof this discharge.   Monitor CBC as " outpatient.         Day of Discharge     Vital Signs:  Temp:  [98.1 °F (36.7 °C)-98.4 °F (36.9 °C)] 98.1 °F (36.7 °C)  Heart Rate:  [73-86] 76  Resp:  [16-20] 16  BP: (147-170)/(74-93) 147/74          Pertinent  and/or Most Recent Results     LAB RESULTS:      Lab 10/09/24  0425 10/07/24  2339 10/07/24  0040 10/06/24  0937   WBC 11.80* 12.33* 16.21* 13.80*   HEMOGLOBIN 8.8* 10.4* 9.7* 10.7*   HEMATOCRIT 28.2* 34.1 31.4* 34.1   PLATELETS 420 582* 515* 578*   NEUTROS ABS 9.52* 10.06* 14.03* 12.83*   IMMATURE GRANS (ABS) 0.10* 0.14* 0.13* 0.07*   LYMPHS ABS 0.95 1.35 0.89 0.41*   MONOS ABS 0.86 0.53 1.01* 0.47   EOS ABS 0.32 0.19 0.11 0.00   MCV 84.7 85.5 84.9 82.6         Lab 10/09/24  0425 10/07/24  2339 10/07/24  0040 10/06/24  0937   SODIUM 143 142 140 138   POTASSIUM 3.7 3.8 4.1 4.0   CHLORIDE 108* 105 102 95*   CO2 24.6 25.7 27.8 27.6   ANION GAP 10.4 11.3 10.2 15.4*   BUN 23 27* 31* 27*   CREATININE 0.91 0.92 0.93 1.04*   EGFR 69.3 68.4 67.5 59.0*   GLUCOSE 184* 123* 176* 158*   CALCIUM 8.2* 8.9 8.9 9.4   MAGNESIUM 2.0 2.3 2.7*  --    PHOSPHORUS 2.6 2.9 5.3*  --          Lab 10/06/24  0937   TOTAL PROTEIN 8.6*   ALBUMIN 4.0   GLOBULIN 4.6   ALT (SGPT) 9   AST (SGOT) 19   BILIRUBIN 0.6   ALK PHOS 87   LIPASE 11*                     Brief Urine Lab Results  (Last result in the past 365 days)        Color   Clarity   Blood   Leuk Est   Nitrite   Protein   CREAT   Urine HCG        10/06/24 0948 Yellow   Clear   Negative   Small (1+)   Negative   30 mg/dL (1+)                 Microbiology Results (last 10 days)       Procedure Component Value - Date/Time    COVID-19 and FLU A/B PCR, 1 HR TAT - Swab, Nasopharynx [[id number]]  (Normal) Collected: 10/06/24 0937    Lab Status: Final result Specimen: Swab from Nasopharynx Updated: 10/06/24 1006     COVID19 Not Detected     Influenza A PCR Not Detected     Influenza B PCR Not Detected    Narrative:      Fact sheet for providers:  https://www.fda.gov/media/159296/download    Fact sheet for patients: https://www.fda.gov/media/147108/download    Test performed by PCR.            US Guided Lymph Node Biopsy    Result Date: 10/8/2024  Impression: Successful ultrasound-guided right inguinal lymph node core biopsy. Report dictated by: Justin Basilio DNP  I have personally reviewed this case and agree with the findings above: Electronically Signed: Elan Skinner MD  10/8/2024 1:21 PM EDT  Workstation ID: [id number]    FL Small Bowel Follow Through Single-Contrast    Result Date: 10/7/2024  Impression: Impression: No significant small bowel obstruction. There is water-soluble contrast in the colon and rectum on the 4-hour image. Electronically Signed: Lowell Walden MD  10/7/2024 4:56 PM EDT  Workstation ID: [id number]    XR Skull < 4 View    Result Date: 10/7/2024  Impression: Impression: Benign right posterior occipital osteoma/exostosis measuring 2.6 x 1.7 cm. Electronically Signed: Jose Luis Murillo MD  10/7/2024 8:13 AM EDT  Workstation ID: [id number]    CT Abdomen Pelvis With Contrast    Result Date: 10/6/2024  Impression: Chest- No evidence of metastatic disease. No acute process demonstrated Abdomen/pelvis- 1. There is ill-defined soft tissue in the right side of the pelvis. Finding could represent treatment related changes and fibrosis. Tumor not excluded, in comparison with any outside prior imaging would be helpful with this regard. The abnormality results in right ureteral obstruction which may be longstanding given the presence of cortical atrophy, and right iliac vein obstruction which is associated with collateral formation. 2. Distal small bowel obstruction with transition at the terminal ileum. Thickening of the terminal ileum may relate to radiation enteropathy or fibrosis. This may also account for thickening of the base of the cecum. Wall thickening of the urinary bladder suggests radiation cystitis 3. Abnormal appearance of the right SI joint and  adjacent bones. 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 The presence of infection of the SI joint is not excluded 4. Thickening of the right iliopsoas muscle may be treatment related or secondary to adjacent SI joint infection if present 5. 3 mm indeterminate liver lesion, likely cyst. Follow-up recommended 6. Mild mesenteric adenopathy, and enlarged right groin lymph nodes Electronically Signed: Abhishek Huang  10/6/2024 3:44 PM EDT  Workstation ID: [id number]    CT Chest With Contrast Diagnostic    Result Date: 10/6/2024  Impression: Chest- No evidence of metastatic disease. No acute process demonstrated Abdomen/pelvis- 1. There is ill-defined soft tissue in the right side of the pelvis. Finding could represent treatment related changes and fibrosis. Tumor not excluded, in comparison with any outside prior imaging would be helpful with this regard. The abnormality results in right ureteral obstruction which may be longstanding given the presence of cortical atrophy, and right iliac vein obstruction which is associated with collateral formation. 2. Distal small bowel obstruction with transition at the terminal ileum. Thickening of the terminal ileum may relate to radiation enteropathy or fibrosis. This may also account for thickening of the base of the cecum. Wall thickening of the urinary bladder suggests radiation cystitis 3. Abnormal appearance of the right SI joint and adjacent bones. 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 The presence of infection of the SI joint is not excluded 4. Thickening of the right iliopsoas muscle may be treatment related or secondary to adjacent SI joint infection if present 5. 3 mm indeterminate liver lesion, likely cyst. Follow-up recommended 6. Mild mesenteric adenopathy, and enlarged right groin lymph nodes Electronically Signed: Abhishek Huang  10/6/2024 3:44 PM EDT  Workstation ID: [id number]    CT Head With & Without Contrast    Result Date:  10/6/2024  Impression: Impression: No acute intracranial pathology. No abnormal intracranial enhancement. Electronically Signed: Alejandro Baugh MD  10/6/2024 3:20 PM EDT  Workstation ID: [id number]    CT Abdomen Pelvis Without Contrast    Result Date: 10/6/2024  Impression: Impression: 1.Abnormal soft tissue density centered in the right hemipelvis, nonspecific in appearance but suspected to largely represent fibrosis and/or post XRT changes. Other possibilities such as underlying neoplasm or infection are considered less likely, however, not definitively excluded. 2.Partial versus early complete small bowel obstruction in this region. 3.Apparent wall thickening of the cecum favored to be secondary to post XRT change although superimposed active inflammation or neoplasm cannot be definitively excluded. 4.Obstruction of the distal right ureter by the above process with resulting severe right hydronephrosis and hydroureter. Severe right renal parenchymal atrophy. 5.Previous hysterectomy 6.Mesentery and right inguinal adenopathy in the lower abdomen and pelvis could be inflammatory or neoplastic in etiology. 7.Multiple vascular collaterals noted in the anterior pelvic wall suggesting some degree of obstruction of the right external and internal iliac artery and veins. Evaluation is limited by the lack of IV contrast. Electronically Signed: Young Blake MD  10/6/2024 11:03 AM EDT  Workstation ID: [id number]     Results for orders placed during the hospital encounter of 06/17/24    Duplex Venous Lower Extremity - Right CAR    Interpretation Summary    Normal right lower extremity venous duplex scan.      Results for orders placed during the hospital encounter of 06/17/24    Duplex Venous Lower Extremity - Right CAR    Interpretation Summary    Normal right lower extremity venous duplex scan.          Labs Pending at Discharge:  Pending Labs       Order Current Status    Flow Cytometry In process    Tissue Pathology Exam In  process            Procedures Performed  Procedure(s):  CYSTOSCOPY, RIGHT URETERAL STENT INSERTION         Consults:   Consults       Date and Time Order Name Status Description    10/6/2024  2:07 PM Inpatient Gastroenterology Consult Completed     10/6/2024  2:04 PM Hematology & Oncology Inpatient Consult Completed     10/6/2024  1:30 PM Inpatient General Surgery Consult Completed     10/6/2024  1:30 PM Inpatient Urology Consult Completed     10/6/2024  1:08 PM Inpatient General Surgery Consult Completed     10/6/2024  1:05 PM Urology (on-call MD unless specified)      10/6/2024 12:47 PM Hospitalist (on-call MD unless specified)                Discharge Details        Discharge Medications        New Medications        Instructions Start Date   ondansetron ODT 4 MG disintegrating tablet  Commonly known as: ZOFRAN-ODT   4 mg, Oral, Every 6 Hours PRN      pantoprazole 40 MG EC tablet  Commonly known as: PROTONIX   40 mg, Oral, Daily             Continue These Medications        Instructions Start Date   BEET ROOT PO   500 mg, Oral, Daily      BIOTIN PO   1 tablet, Oral, Daily      Calcium-Magnesium-Zinc-D3 tablet   1 tablet, Oral, Daily      cyanocobalamin 2500 MCG tablet tablet  Commonly known as: VITAMIN B-12   500 mcg, Oral, Daily      IRON PO   65 mg, Oral, Daily      multivitamin with minerals tablet tablet   1 tablet, Oral, Daily      vitamin B-6 50 MG tablet  Commonly known as: PYRIDOXINE   100 mg, Oral, Daily               Allergies   Allergen Reactions    Augmentin [Amoxicillin-Pot Clavulanate] Hives         Discharge Disposition:   Home or Self Care    Diet:  Hospital:  Diet Order   Procedures    Diet: Gastrointestinal; Fiber-Restricted; Fluid Consistency: Thin (IDDSI 0)         Discharge Activity:   as tolerated       CODE STATUS:  There are no questions and answers to display.         Future Appointments   Date Time Provider Department Center   10/15/2024  1:00 PM Olivia Gutierrez, PT [facility]    10/23/2024  2:00 PM Olivia Gutierrez, PT [facility]   10/28/2024  1:00 PM Olivia Gutierrez, PT [facility]   11/6/2024  1:00 PM lOivia Gutierrez, PT [facility]   11/13/2024 10:00 AM Olivia Gutierrez, PT [facility]           Time spent on Discharge including face to face service:  >35 minutes    Signature: Electronically signed by Barbara Chinchilla MD, 10/09/24, 09:36 EDT.  [facility] Hospitalist Team

## 2024-10-09 NOTE — OUTREACH NOTE
Prep Survey      Flowsheet Row Responses   [facility] patient discharged from? [facility]   Is LACE score < 7 ? Yes   Eligibility [facility]   Date of Admission 10/06/24   Date of Discharge 10/09/24   Discharge Disposition Home or Self Care   Discharge diagnosis SBO (small bowel obstruction)   Does the patient have one of the following disease processes/diagnoses(primary or secondary)? Other   Does the patient have Home health ordered? No   Is there a DME ordered? No   Prep survey completed? Yes            Jacklyn ALFARO - Registered Nurse

## 2024-10-09 NOTE — PLAN OF CARE
Goal Outcome Evaluation:  Plan of Care Reviewed With: patient                                 Pt A&O x4. No complaints of nausea. Tolerating diet well. Pt discharging home today.

## 2024-10-09 NOTE — PROGRESS NOTES
General Surgery Inpatient Progress Note      Name: Marilyn Pineda ADMIT: 10/6/2024   : 1955  PCP: Cinthia Brown MD    MRN: [id number] LOS: 2 days   AGE/SEX: 68 y.o. female  ROOM: [address] [place] [address]      Patient Care Team:  Cinthia Brown MD as PCP - General (Internal Medicine & Pediatrics)  Patrick Leblanc APRN (Obstetrics and Gynecology)  Chief Complaint   Patient presents with    Diarrhea    Vomiting    Weakness - Generalized       Subjective:  Patient seen and examined.  She tolerated clears for breakfast however when her diet was advanced she did have a small bout of nausea and small emesis.  Denies any abdominal pain.  Does not feel more bloated.  Continues to have loose stools.  Status post right inguinal lymph node biopsy.    Medications:  pantoprazole, 40 mg, Intravenous, BID AC  sodium chloride, 10 mL, Intravenous, Q12H         acetaminophen **OR** acetaminophen **OR** acetaminophen    aluminum-magnesium hydroxide-simethicone    Calcium Replacement - Follow Nurse / BPA Driven Protocol    dextrose    dextrose    glucagon (human recombinant)    hydrALAZINE    hydrOXYzine    Magnesium Standard Dose Replacement - Follow Nurse / BPA Driven Protocol    melatonin    nitroglycerin    ondansetron ODT **OR** ondansetron    Phosphorus Replacement - Follow Nurse / BPA Driven Protocol    Potassium Replacement - Follow Nurse / BPA Driven Protocol    prochlorperazine    [COMPLETED] Insert Peripheral IV **AND** sodium chloride    sodium chloride     Vitals:  Temp:  [98.2 °F (36.8 °C)-98.4 °F (36.9 °C)] 98.4 °F (36.9 °C)  Heart Rate:  [73-86] 86  Resp:  [17-20] 17  BP: (151-176)/(82-98) 168/93     Physical Exam:  No acute distress, alert  Nonlabored respirations  Abdomen soft, nondistended, nontender palpation  Extremities with no gross deformities    Labs:  Results from last 7 days   Lab Units 10/07/24  2339 10/07/24  0040 10/06/24  0937   WBC 10*3/mm3 12.33* 16.21* 13.80*   HEMOGLOBIN g/dL  10.4* 9.7* 10.7*   HEMATOCRIT % 34.1 31.4* 34.1   PLATELETS 10*3/mm3 582* 515* 578*     Results from last 7 days   Lab Units 10/07/24  2339 10/07/24  0040 10/06/24  0937   SODIUM mmol/L 142 140 138   POTASSIUM mmol/L 3.8 4.1 4.0   CHLORIDE mmol/L 105 102 95*   CO2 mmol/L 25.7 27.8 27.6   BUN mg/dL 27* 31* 27*   CREATININE mg/dL 0.92 0.93 1.04*   CALCIUM mg/dL 8.9 8.9 9.4   BILIRUBIN mg/dL  --   --  0.6   ALK PHOS U/L  --   --  87   ALT (SGPT) U/L  --   --  9   AST (SGOT) U/L  --   --  19   GLUCOSE mg/dL 123* 176* 158*     Assessment and Plan:  68 y.o. female with signs and symptoms of partial obstruction secondary to thickened terminal ileum.  Suspect this may be reactive to prior history of cervical cancer with radiation.  Clinically improving and small bowel follow-through without evidence of obstruction.    - Continue regular diet but given some nausea today recommend observing patient for another day  - If patient is able to tolerate p.o. intake today then okay for patient to be discharged home tomorrow in the morning  -If patient continues to return with obstructive type symptoms we will need to consider potential resection however this does not appear necessary at this time     This note was created using Dragon Voice Recognition software.    Marian Todd MD  10/08/24  22:14 EDT

## 2024-10-09 NOTE — PLAN OF CARE
Goal Outcome Evaluation: Pt amb in hallway this shift several time, Denies any nausea so far this shift.  Educated pt on new med atrax but refused at this this. B/P remains elevated but refuses medication.  Pt has c/o of loose stools. Plan of care ongoing.

## 2024-10-09 NOTE — TELEPHONE ENCOUNTER
Hub staff attempted to follow warm transfer process and was unsuccessful     Caller: Marilyn Pineda    Relationship to patient: Self    Best call back number: [phone]    Patient is needing: CESAR ([phone]) CALLING FROM [facility].     NEED  HOSPITAL F/U IN ONE WEEK. WAS CONSULTED IN THE HOSPITAL ON 10/6/2024.    CALL THE PATIENT TO SCHEDULE

## 2024-10-09 NOTE — PROGRESS NOTES
Hematology/Oncology Inpatient Progress Note    Patient name: Marilyn Pineda  : 1955  MRN: [id number]  Referring Provider: Dr. Diop  Reason for Consultation: Abdominal mass    Chief complaint: Nausea and vomiting    History of present illness:    Marilyn Pineda is a 68 y.o. female who presented to [facility] on 10/6/2024 with complaints of nausea and vomiting, diarrhea for several days. She denies fever or chills.     10/6/2024  CT abdomen pelvis without contrast  Impression:  1.Abnormal soft tissue density centered in the right hemipelvis, nonspecific in appearance but suspected to largely represent fibrosis and/or post XRT changes. Other possibilities such as underlying neoplasm or infection are considered less likely,   however, not definitively excluded.  2.Partial versus early complete small bowel obstruction in this region.  3.Apparent wall thickening of the cecum favored to be secondary to post XRT change although superimposed active inflammation or neoplasm cannot be definitively excluded.  4.Obstruction of the distal right ureter by the above process with resulting severe right hydronephrosis and hydroureter. Severe right renal parenchymal atrophy.  5.Previous hysterectomy  6.Mesentery and right inguinal adenopathy in the lower abdomen and pelvis could be inflammatory or neoplastic in etiology.  7.Multiple vascular collaterals noted in the anterior pelvic wall suggesting some degree of obstruction of the right external and internal iliac artery and veins. Evaluation is limited by the lack of IV contrast.       10/6/2024 CT chest abdomen pelvis with contrast  IMPRESSION:  Chest-  No evidence of metastatic disease. No acute process demonstrated     Abdomen/pelvis-  1. There is ill-defined soft tissue in the right side of the pelvis. Finding could represent treatment related changes and fibrosis. Tumor not excluded, in comparison with any outside prior imaging would be helpful with this  regard. The abnormality   results in right ureteral obstruction which may be longstanding given the presence of cortical atrophy, and right iliac vein obstruction which is associated with collateral formation.  2. Distal small bowel obstruction with transition at the terminal ileum. Thickening of the terminal ileum may relate to radiation enteropathy or fibrosis. This may also account for thickening of the base of the cecum. Wall thickening of the urinary   bladder suggests radiation cystitis  3. Abnormal appearance of the right SI joint and adjacent bones. 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 The presence of infection of the SI joint is not excluded  4. Thickening of the right iliopsoas muscle may be treatment related or secondary to adjacent SI joint infection if present  5. 3 mm indeterminate liver lesion, likely cyst. Follow-up recommended  6. Mild mesenteric adenopathy, and enlarged right groin lymph nodes    10/6/2024 CT head w/wo contrast  Impression: No acute intracranial pathology. No abnormal intracranial enhancement.     Urology has been consulted. They believe hydronephrosis is chronic and unlikely to be the source of her nausea.  GI and general surgery have also been consulted.    10/07/24  Hematology/Oncology was consulted.  Patient reports a remote history of cervical cancer diagnosed in 1993 status post hysterectomy.  Of note, she did have a positive Cologuard screening in June 2024 and is scheduled for outpatient colonoscopy.    10/7/24: patient seen today for initial evaluation. Noted to be comfortable. Denied any acute complaints. No abdominal pain at this time. Nausea/vomiting has improved.    10/8/24: Right inguinal LN Biopsy:  Path and Flow cytometry pending.      He/She  has a past medical history of Allergic, Anemia, Arthritis (07/17/2024), Cancer (1993), Pain of right hip joint (01/09/2018), Right lower lobe pneumonia (01/09/2018), and Thrush, oral (01/18/2018).    PCP:  Cinthia Brown MD    INTERVAL HISTORY:   10/9/24: pt seen today.appear to be doing well this morning. Had Ingunial LN biopsy yesterday, denied any issues thereafter. No abd pain this AM. Tolerating diet well, has mild off and on diarrhea.     History:  Past Medical History:   Diagnosis Date    Allergic     amoxicilin clayvix    Anemia     Arthritis 2024    In right  foot    Cancer     cervial cancer/ radiation and had hysterectomy    Pain of right hip joint 2018    Right lower lobe pneumonia 2018    Thrush, oral 2018   ,   Past Surgical History:   Procedure Laterality Date     SECTION  1984    HAND SURGERY      HYSTERECTOMY      US GUIDED LYMPH NODE BIOPSY  10/8/2024   ,   Family History   Problem Relation Age of Onset    COPD Mother         from smoking for years    Miscarriages / Stillbirths Mother         had a miscarriage    Thyroid disease Father         is taking thyroid pil;    Cancer Maternal Grandmother         had lung cancer   ,   Social History     Tobacco Use    Smoking status: Never    Smokeless tobacco: Never   Vaping Use    Vaping status: Never Used   Substance Use Topics    Alcohol use: Not Currently     Comment: may drink a little wine but only very occasionally    Drug use: No   ,   Medications Prior to Admission   Medication Sig Dispense Refill Last Dose    BIOTIN PO Take 1 tablet by mouth Daily.       cyanocobalamin (VITAMIN B-12) 2500 MCG tablet tablet Take 500 mcg by mouth Daily.       Ferrous Sulfate (IRON PO) Take 65 mg by mouth Daily.       Misc Natural Products (BEET ROOT PO) Take 500 mg by mouth Daily.       Multiple Minerals-Vitamins (Calcium-Magnesium-Zinc-D3) tablet Take 1 tablet by mouth Daily.       multivitamin with minerals (MULTIVITAMIN WOMEN PO) Take 1 tablet by mouth Daily.       vitamin B-6 (PYRIDOXINE) 50 MG tablet Take 2 tablets by mouth Daily.      , Scheduled Meds:  pantoprazole, 40 mg, Intravenous, BID AC  sodium  chloride, 10 mL, Intravenous, Q12H    , Continuous Infusions:     , PRN Meds:    acetaminophen **OR** acetaminophen **OR** acetaminophen    aluminum-magnesium hydroxide-simethicone    Calcium Replacement - Follow Nurse / BPA Driven Protocol    dextrose    dextrose    glucagon (human recombinant)    hydrALAZINE    hydrOXYzine    Magnesium Standard Dose Replacement - Follow Nurse / BPA Driven Protocol    melatonin    nitroglycerin    ondansetron ODT **OR** ondansetron    Phosphorus Replacement - Follow Nurse / BPA Driven Protocol    Potassium Replacement - Follow Nurse / BPA Driven Protocol    prochlorperazine    [COMPLETED] Insert Peripheral IV **AND** sodium chloride    sodium chloride   Allergies:  Augmentin [amoxicillin-pot clavulanate]    Subjective     ROS:  Review of Systems   Constitutional: Negative.    HENT: Negative.     Eyes: Negative.    Respiratory: Negative.     Cardiovascular: Negative.    Gastrointestinal: Negative.    Endocrine: Negative.    Genitourinary: Negative.    Musculoskeletal: Negative.    Skin: Negative.    Allergic/Immunologic: Negative.    Neurological: Negative.    Hematological:  Positive for adenopathy (Right groin adenopathy).   Psychiatric/Behavioral: Negative.          Objective   Vitals:    10/09/24 0426   BP: 147/74   Pulse: 76   Resp: 16   Temp: 98.1 °F (36.7 °C)   SpO2: 96%         Physical Exam: (performed by MD)  Physical Exam  Constitutional:       Appearance: Normal appearance. She is normal weight.   HENT:      Head: Normocephalic and atraumatic.      Right Ear: External ear normal.      Left Ear: External ear normal.      Nose: Nose normal.      Mouth/Throat:      Mouth: Mucous membranes are moist.      Pharynx: Oropharynx is clear.   Eyes:      Extraocular Movements: Extraocular movements intact.      Conjunctiva/sclera: Conjunctivae normal.      Pupils: Pupils are equal, round, and reactive to light.   Cardiovascular:      Rate and Rhythm: Normal rate.      Pulses:  Normal pulses.   Pulmonary:      Effort: Pulmonary effort is normal.   Abdominal:      General: Abdomen is flat.      Palpations: Abdomen is soft.   Musculoskeletal:         General: Normal range of motion.      Cervical back: Normal range of motion and neck supple.   Skin:     General: Skin is warm.   Neurological:      Mental Status: She is alert.   Psychiatric:         Mood and Affect: Mood normal.         Behavior: Behavior normal.         Thought Content: Thought content normal.         Judgment: Judgment normal.         Results Review:  Lab Results (last 48 hours)       Procedure Component Value Units Date/Time    POC Glucose Once [[id number]]  (Abnormal) Collected: 10/07/24 0546    Specimen: Blood Updated: 10/07/24 0548     Glucose 127 mg/dL      Comment: Serial Number: [id number]:  [id number]       Basic Metabolic Panel [[id number]]  (Abnormal) Collected: 10/07/24 0040    Specimen: Blood from Arm, Left Updated: 10/07/24 0114     Glucose 176 mg/dL      BUN 31 mg/dL      Creatinine 0.93 mg/dL      Sodium 140 mmol/L      Potassium 4.1 mmol/L      Chloride 102 mmol/L      CO2 27.8 mmol/L      Calcium 8.9 mg/dL      BUN/Creatinine Ratio 33.3     Anion Gap 10.2 mmol/L      eGFR 67.5 mL/min/1.73     Narrative:      GFR Normal >60  Chronic Kidney Disease <60  Kidney Failure <15      Magnesium [[id number]]  (Abnormal) Collected: 10/07/24 0040    Specimen: Blood from Arm, Left Updated: 10/07/24 0114     Magnesium 2.7 mg/dL     Phosphorus [[id number]]  (Abnormal) Collected: 10/07/24 0040    Specimen: Blood from Arm, Left Updated: 10/07/24 0114     Phosphorus 5.3 mg/dL     CBC & Differential [[id number]]  (Abnormal) Collected: 10/07/24 0040    Specimen: Blood from Arm, Left Updated: 10/07/24 0049    Narrative:      The following orders were created for panel order CBC & Differential.  Procedure                               Abnormality         Status                     ---------                                -----------         ------                     CBC Auto Differential[[id number]]        Abnormal            Final result                 Please view results for these tests on the individual orders.    CBC Auto Differential [[id number]]  (Abnormal) Collected: 10/07/24 0040    Specimen: Blood from Arm, Left Updated: 10/07/24 0049     WBC 16.21 10*3/mm3      RBC 3.70 10*6/mm3      Hemoglobin 9.7 g/dL      Hematocrit 31.4 %      MCV 84.9 fL      MCH 26.2 pg      MCHC 30.9 g/dL      RDW 15.7 %      RDW-SD 48.2 fl      MPV 8.5 fL      Platelets 515 10*3/mm3      Neutrophil % 86.6 %      Lymphocyte % 5.5 %      Monocyte % 6.2 %      Eosinophil % 0.7 %      Basophil % 0.2 %      Immature Grans % 0.8 %      Neutrophils, Absolute 14.03 10*3/mm3      Lymphocytes, Absolute 0.89 10*3/mm3      Monocytes, Absolute 1.01 10*3/mm3      Eosinophils, Absolute 0.11 10*3/mm3      Basophils, Absolute 0.04 10*3/mm3      Immature Grans, Absolute 0.13 10*3/mm3      nRBC 0.0 /100 WBC     POC Glucose Once [[id number]]  (Abnormal) Collected: 10/07/24 0039    Specimen: Blood Updated: 10/07/24 0041     Glucose 127 mg/dL      Comment: Serial Number: [id number]:  [id number]       POC Glucose Once [[id number]]  (Abnormal) Collected: 10/06/24 1757    Specimen: Blood Updated: 10/06/24 1758     Glucose 117 mg/dL      Comment: Serial Number: [id number]:  [id number]       Comprehensive Metabolic Panel [[id number]]  (Abnormal) Collected: 10/06/24 0937    Specimen: Blood Updated: 10/06/24 1014     Glucose 158 mg/dL      BUN 27 mg/dL      Creatinine 1.04 mg/dL      Sodium 138 mmol/L      Potassium 4.0 mmol/L      Chloride 95 mmol/L      CO2 27.6 mmol/L      Calcium 9.4 mg/dL      Total Protein 8.6 g/dL      Albumin 4.0 g/dL      ALT (SGPT) 9 U/L      AST (SGOT) 19 U/L      Alkaline Phosphatase 87 U/L      Total Bilirubin 0.6 mg/dL      Globulin 4.6 gm/dL      A/G Ratio 0.9 g/dL      BUN/Creatinine Ratio 26.0     Anion Gap 15.4 mmol/L      eGFR 59.0  mL/min/1.73     Narrative:      GFR Normal >60  Chronic Kidney Disease <60  Kidney Failure <15      Urinalysis, Microscopic Only - Urine, Clean Catch [[id number]]  (Abnormal) Collected: 10/06/24 0948    Specimen: Urine, Clean Catch Updated: 10/06/24 1012     RBC, UA 0-2 /HPF      WBC, UA 3-5 /HPF      Bacteria, UA None Seen /HPF      Squamous Epithelial Cells, UA 13-20 /HPF      Hyaline Casts, UA 7-12 /LPF      Methodology Manual Light Microscopy    Lipase [[id number]]  (Abnormal) Collected: 10/06/24 0937    Specimen: Blood Updated: 10/06/24 1006     Lipase 11 U/L     COVID-19 and FLU A/B PCR, 1 HR TAT - Swab, Nasopharynx [[id number]]  (Normal) Collected: 10/06/24 0937    Specimen: Swab from Nasopharynx Updated: 10/06/24 1006     COVID19 Not Detected     Influenza A PCR Not Detected     Influenza B PCR Not Detected    Narrative:      Fact sheet for providers: https://www.fda.gov/media/844202/download    Fact sheet for patients: https://www.fda.gov/media/683228/download    Test performed by PCR.    Urinalysis With Microscopic If Indicated (No Culture) - Urine, Clean Catch [[id number]]  (Abnormal) Collected: 10/06/24 0948    Specimen: Urine, Clean Catch Updated: 10/06/24 1002     Color, UA Yellow     Appearance, UA Clear     pH, UA 7.0     Specific Gravity, UA 1.022     Glucose, UA Negative     Ketones, UA 15 mg/dL (1+)     Bilirubin, UA Negative     Blood, UA Negative     Protein, UA 30 mg/dL (1+)     Leuk Esterase, UA Small (1+)     Nitrite, UA Negative     Urobilinogen, UA 1.0 E.U./dL    CBC & Differential [[id number]]  (Abnormal) Collected: 10/06/24 0937    Specimen: Blood Updated: 10/06/24 0946    Narrative:      The following orders were created for panel order CBC & Differential.  Procedure                               Abnormality         Status                     ---------                               -----------         ------                     CBC Auto Differential[[id number]]        Abnormal            Final  result                 Please view results for these tests on the individual orders.    CBC Auto Differential [[id number]]  (Abnormal) Collected: 10/06/24 0937    Specimen: Blood Updated: 10/06/24 0946     WBC 13.80 10*3/mm3      RBC 4.13 10*6/mm3      Hemoglobin 10.7 g/dL      Hematocrit 34.1 %      MCV 82.6 fL      MCH 25.9 pg      MCHC 31.4 g/dL      RDW 15.4 %      RDW-SD 46.8 fl      MPV 8.6 fL      Platelets 578 10*3/mm3      Neutrophil % 93.0 %      Lymphocyte % 3.0 %      Monocyte % 3.4 %      Eosinophil % 0.0 %      Basophil % 0.1 %      Immature Grans % 0.5 %      Neutrophils, Absolute 12.83 10*3/mm3      Lymphocytes, Absolute 0.41 10*3/mm3      Monocytes, Absolute 0.47 10*3/mm3      Eosinophils, Absolute 0.00 10*3/mm3      Basophils, Absolute 0.02 10*3/mm3      Immature Grans, Absolute 0.07 10*3/mm3      nRBC 0.0 /100 WBC              Pending Results:   US Guided Lymph Node Biopsy    Result Date: 10/8/2024  Successful ultrasound-guided right inguinal lymph node core biopsy. Report dictated by: Justin Basilio DNP  I have personally reviewed this case and agree with the findings above: Electronically Signed: Elan Skinner MD  10/8/2024 1:21 PM EDT  Workstation ID: [id number]    FL Small Bowel Follow Through Single-Contrast    Result Date: 10/7/2024  Impression: No significant small bowel obstruction. There is water-soluble contrast in the colon and rectum on the 4-hour image. 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 Electronically Signed: Jose Luis Murillo MD  10/7/2024 8:13 AM EDT  Workstation ID: [id number]    CT Abdomen Pelvis With Contrast    Result Date: 10/6/2024  Chest- No evidence of metastatic disease. No acute process demonstrated Abdomen/pelvis- 1. There is ill-defined soft tissue in the right side of the pelvis. Finding could  represent treatment related changes and fibrosis. Tumor not excluded, in comparison with any outside prior imaging would be helpful with this regard. The abnormality results in right ureteral obstruction which may be longstanding given the presence of cortical atrophy, and right iliac vein obstruction which is associated with collateral formation. 2. Distal small bowel obstruction with transition at the terminal ileum. Thickening of the terminal ileum may relate to radiation enteropathy or fibrosis. This may also account for thickening of the base of the cecum. Wall thickening of the urinary bladder suggests radiation cystitis 3. Abnormal appearance of the right SI joint and adjacent bones. 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 The presence of infection of the SI joint is not excluded 4. Thickening of the right iliopsoas muscle may be treatment related or secondary to adjacent SI joint infection if present 5. 3 mm indeterminate liver lesion, likely cyst. Follow-up recommended 6. Mild mesenteric adenopathy, and enlarged right groin lymph nodes Electronically Signed: Abhishek Huang  10/6/2024 3:44 PM EDT  Workstation ID: [id number]    CT Chest With Contrast Diagnostic    Result Date: 10/6/2024  Chest- No evidence of metastatic disease. No acute process demonstrated Abdomen/pelvis- 1. There is ill-defined soft tissue in the right side of the pelvis. Finding could represent treatment related changes and fibrosis. Tumor not excluded, in comparison with any outside prior imaging would be helpful with this regard. The abnormality results in right ureteral obstruction which may be longstanding given the presence of cortical atrophy, and right iliac vein obstruction which is associated with collateral formation. 2. Distal small bowel obstruction with transition at the terminal ileum. Thickening of the terminal ileum may relate to radiation enteropathy or fibrosis. This may also account for thickening of the base of  the cecum. Wall thickening of the urinary bladder suggests radiation cystitis 3. Abnormal appearance of the right SI joint and adjacent bones. 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 The presence of infection of the SI joint is not excluded 4. Thickening of the right iliopsoas muscle may be treatment related or secondary to adjacent SI joint infection if present 5. 3 mm indeterminate liver lesion, likely cyst. Follow-up recommended 6. Mild mesenteric adenopathy, and enlarged right groin lymph nodes Electronically Signed: Abhishek Huang  10/6/2024 3:44 PM EDT  Workstation ID: [id number]    CT Head With & Without Contrast    Result Date: 10/6/2024  Impression: No acute intracranial pathology. No abnormal intracranial enhancement. Electronically Signed: Alejandro Baugh MD  10/6/2024 3:20 PM EDT  Workstation ID: [id number]    CT Abdomen Pelvis Without Contrast    Result Date: 10/6/2024  Impression: 1.Abnormal soft tissue density centered in the right hemipelvis, nonspecific in appearance but suspected to largely represent fibrosis and/or post XRT changes. Other possibilities such as underlying neoplasm or infection are considered less likely, however, not definitively excluded. 2.Partial versus early complete small bowel obstruction in this region. 3.Apparent wall thickening of the cecum favored to be secondary to post XRT change although superimposed active inflammation or neoplasm cannot be definitively excluded. 4.Obstruction of the distal right ureter by the above process with resulting severe right hydronephrosis and hydroureter. Severe right renal parenchymal atrophy. 5.Previous hysterectomy 6.Mesentery and right inguinal adenopathy in the lower abdomen and pelvis could be inflammatory or neoplastic in etiology. 7.Multiple vascular collaterals noted in the anterior pelvic wall suggesting some degree of obstruction of the right external and internal iliac artery and veins. Evaluation is limited by the lack of IV  contrast. Electronically Signed: Young Blake MD  10/6/2024 11:03 AM EDT  Workstation ID: [id number]          Assessment & Plan     Abnormal soft tissue density in right hemipelvis: Suspicious for malignancy  Mesentery and right inguinal adenopathy  -imaging findings were reviewed as above. The overall picture is concerning for malignancy vs infectious/inflammatory etiology. Less likely to be sequela of prior radiation given long time having passed since XRT (30 years).   -She has right groin lymphadenopathy, this was biopsied on 10/8/24. Path is pending.  -pt will follow up with us as outpatient to discuss pathology findings and further management, She is agreeable to it.    Partial small bowel obstruction  -General Surgery has been consulted  -Gastroenterology is evaluated patient.  She is scheduled for outpatient colonoscopy on 10/16/2024.  -Planning for small bowel follow-through  -No surgical interventions recommended as per Surgery (Dr. Todd).   -Symptoms have improved.    Leukocytosis  Thrombocytosis  Normocytic anemia  -Leukocytosis and thrombocytosis may be reactive in nature.   -Iron panel is not consistent with IRISH, but more likely to be Anemia of chronic disease    Continue to monitor CBC     Personal history of cervical cancer diagnosed in 1990s  -Patient is status post hysterectomy and radiation treatment  -low concern for recurrence of  primary at this time.  -urology evaluated the pt. No further interventions recommended.      . Marcelino Ricardo MD 10/09/24     Thank you for this consult. Will follow the patient as outpatient. Cleared for discharge from Hem Onc standpoint.

## 2024-10-09 NOTE — CASE MANAGEMENT/SOCIAL WORK
Case Management Discharge Note      Final Note: HOME    Provided Post Acute Provider List?: N/A    Selected Continued Care - Discharged on 10/9/2024 Admission date: 10/6/2024 - Discharge disposition: Home or Self Care            Transportation Services  Private: Car    Final Discharge Disposition Code: 01 - home or self-care

## 2024-10-10 ENCOUNTER — TRANSITIONAL CARE MANAGEMENT TELEPHONE ENCOUNTER (OUTPATIENT)
Dept: CALL CENTER | Facility: HOSPITAL | Age: 69
End: 2024-10-10
Payer: MEDICARE

## 2024-10-10 NOTE — OUTREACH NOTE
Call Center TCM Note      Flowsheet Row Responses   [facility] patient discharged from? [facility]   Does the patient have one of the following disease processes/diagnoses(primary or secondary)? Other   TCM attempt successful? Yes   Call start time 1354   Call end time 1357   Discharge diagnosis SBO (small bowel obstruction)   Meds reviewed with patient/caregiver? Yes   Is the patient having any side effects they believe may be caused by any medication additions or changes? No   Does the patient have all medications ordered at discharge? Yes   Is the patient taking all medications as directed (includes completed medication regime)? Yes   Does the patient have an appointment with their PCP within 7-14 days of discharge? Yes   Has home health visited the patient within 72 hours of discharge? N/A   Psychosocial issues? No   Did the patient receive a copy of their discharge instructions? Yes   Nursing interventions Reviewed instructions with patient   What is the patient's perception of their health status since discharge? Improving   Is the patient/caregiver able to teach back signs and symptoms related to disease process for when to call PCP? Yes   Is the patient/caregiver able to teach back signs and symptoms related to disease process for when to call 911? Yes   Is the patient/caregiver able to teach back the hierarchy of who to call/visit for symptoms/problems? PCP, Specialist, Home health nurse, Urgent Care, ED, 911 Yes   If the patient is a current smoker, are they able to teach back resources for cessation? Not a smoker   TCM call completed? Yes   Call end time 1357   Would this patient benefit from a Referral to Amb Social Work? No   Is the patient interested in additional calls from an ambulatory ? No            Kathy Michel LPN    10/10/2024, 13:57 EDT

## 2024-10-11 ENCOUNTER — OFFICE VISIT (OUTPATIENT)
Dept: FAMILY MEDICINE CLINIC | Facility: CLINIC | Age: 69
End: 2024-10-11
Payer: MEDICARE

## 2024-10-11 VITALS
HEIGHT: 65 IN | OXYGEN SATURATION: 100 % | SYSTOLIC BLOOD PRESSURE: 126 MMHG | RESPIRATION RATE: 18 BRPM | WEIGHT: 189.25 LBS | DIASTOLIC BLOOD PRESSURE: 84 MMHG | HEART RATE: 84 BPM | BODY MASS INDEX: 31.53 KG/M2

## 2024-10-11 DIAGNOSIS — R19.5 POSITIVE COLORECTAL CANCER SCREENING USING COLOGUARD TEST: ICD-10-CM

## 2024-10-11 DIAGNOSIS — K56.609 SMALL BOWEL OBSTRUCTION: ICD-10-CM

## 2024-10-11 DIAGNOSIS — Z09 HOSPITAL DISCHARGE FOLLOW-UP: Primary | ICD-10-CM

## 2024-10-11 DIAGNOSIS — K92.9: ICD-10-CM

## 2024-10-11 DIAGNOSIS — D50.9 IRON DEFICIENCY ANEMIA, UNSPECIFIED IRON DEFICIENCY ANEMIA TYPE: ICD-10-CM

## 2024-10-11 NOTE — PROGRESS NOTES
Transitional Care Follow Up Visit  Subjective     Marilyn Pineda is a 68 y.o. female who presents for a transitional care management visit.    Within 48 business hours after discharge our office contacted her via telephone to coordinate her care and needs.      I reviewed and discussed the details of that call along with the discharge summary, hospital problems, inpatient lab results, inpatient diagnostic studies, and consultation reports with Marilyn.     Current outpatient and discharge medications have been reconciled for the patient.  Reviewed by: Cinthia Brown MD          10/19/2024     4:58 PM   Date of TCM Phone Call   [facility]   Date of Admission 10/17/2024   Date of Discharge 10/19/2024   Discharge Disposition Home or Self Care     Risk for Readmission (LACE) Score: 7 (10/19/2024  6:00 AM)      History of Present Illness   Course During Hospital Stay:    A 68 y.o. old female patient with PMH of cervical cancer status post radiation and hysterectomy in 1993 presents to the hospital with complaints of nausea and vomiting.  Patient is vomiting brown/blackish color vomitus.  Last time bowel movement is yesterday and patient has been having diarrhea for past 1 week.  Patient labs with mild leukocytosis.  Creatinine of 1.  CT abdominal pelvis without contrast showing there is abnormal soft tissue density in right hemipelvis can be fibrosis and or post radiation changesAbnormal soft tissue density centered in the right hemipelvis, nonspecific in appearance but suspected to largely represent fibrosis and/or post XRT changes. Other possibilities such as underlying neoplasm or infection are considered less likely, however, not definitively excluded.Partial versus early complete small bowel obstruction in this region.  Apparent wall thickening of the cecum favored to be secondary to post XRT change although superimposed active inflammation or neoplasm cannot be definitively  "excluded.Obstruction of the distal right ureter by the above process with resulting severe right hydronephrosis and hydroureter. Severe right renal parenchymal atrophy.Mesentery and right inguinal adenopathy in the lower abdomen and pelvis could be inflammatory or neoplastic in etiology.Multiple vascular collaterals noted in the anterior pelvic wall suggesting some degree of obstruction of the right external and internal iliac artery and veins. Evaluation is limited by the lack of IV contrast.  Will get CT abdomen and pelvis with contrast.  GI general surgery heme-onc consulted.\"     Hospital Course:  # Nausea and vomiting secondary to ? SBO  # Abdominal soft tissue density in right hemipelvis, malignancy to exclude  #Severe right-sided hydronephrosis  -Patient presented with nausea and vomiting - brown / blackish color   -CTAP with partial versus early complete small bowel obstruction.  -Patient has abnormal soft tissue density in right hemipelvis as well.  -Right severe hydronephrosis present  -there is wall thickening of the cecum favored to be secondary to post XRT change although superimposed active inflammation or neoplasm cannot be definitively excluded.   -GI recommendations noted- Okay to advance diet later today from GI standpoint after lymph node biopsy. Not much else to add from a GI standpoint as an inpatient at this time. Recommend proceeding with outpatient colonoscopy as scheduled on 10/17/2024.   -Surgery recommendations appreciated- no intervention  -oncology recommendations noted- rec biopsy of enlarged lymph nodes in the right groin in the setting of prior history of cervical cancer can secondary to chronic lymphedema from radiation causing fibrosis and compression of vessels  . Follow up outpatient to discuss pathology report   -Urology recommendations noted- no intervention       Marilyn presents for follow-up after recent hospitalization for bowel obstruction. She reports feeling tired and weak " since discharge 2 days ago. She notes some improvement in bowel function with small bowel movements starting.    Iron Deficiency Anemia  Marilyn has a history of iron deficiency anemia. Prior to hospitalization, she was taking iron supplements every other day. She felt this regimen was helping her symptoms and labs more than previous daily dosing. However, she notes her iron levels were low on admission. She is due for a colonoscopy and has been instructed to hold iron supplementation for 1 week prior to the procedure.  - Details of current treatment and patient's response: Marilyn was previously taking iron supplements daily, then transitioned to every other day dosing. She subjectively felt better on every other day dosing.  - Current symptoms and management: Fatigue and weakness, worse since recent hospitalization. Iron supplementation on hold this week in preparation for colonoscopy.  - History of previous treatments and their effectiveness: Daily iron supplementation in the past with improvement in labs but not complete resolution of anemia. Transition to every other day dosing with subjective improvement in symptoms compared to daily dosing.    Cervical Cancer  Marilyn has a history of cervical cancer treated with radiation therapy. She notes the radiation oncologist informed her that the radiation treatment could cause changes to her bowels.  - Details of current treatment and patient's response: Marilyn is in surveillance after completing radiation therapy. Recent scans showed changes thought to be related to radiation, including bowel thickening and obstruction, as well as an area of soft tissue density in the right pelvis which could represent scarring or a new mass.  - Relevant past procedures or imaging results: Recent CT scans during hospitalization revealed findings concerning for radiation-related changes as above. Lymph node biopsy was performed during hospitalization.    Bowel Obstruction  Marilyn was  "recently hospitalized for a bowel obstruction, which was managed non-operatively. CT findings were concerning for possible radiation-related changes as above.  - Details of current treatment and patient's response: Marilyn was treated non-operatively during hospitalization with improvement in symptoms. She was placed on a low fiber diet. She notes her bowel movements are slowly improving since discharge.  - Current symptoms and management: Marilyn still feels bloated and is passing small bowel movements. Her abdomen is not as distended and her nausea has improved. She is tolerating a low fiber diet.    - Marilyn previously had a Cologuard test which was positive. She is scheduled for a colonoscopy next week for further evaluation.    Results/Imaging/Labs Reviewed:  - CT abdomen/pelvis from recent hospitalization showed thickening of the terminal ileum and right pelvis soft tissue density, possibly related to prior radiation. Right hydronephrosis noted, possibly due to mass effect from right pelvic soft tissue density. Enlarged retroperitoneal and pelvic lymph nodes seen.  - Lymph node biopsy from hospitalization was negative for malignancy.  - Labs from recent hospitalization notable for iron deficiency anemia.          The following portions of the patient's history were reviewed and updated as appropriate: allergies, current medications, past family history, past medical history, past social history, past surgical history, and problem list.    Review of Systems   Constitutional:  Positive for appetite change and fatigue. Negative for fever.   Respiratory: Negative.     Cardiovascular: Negative.    Gastrointestinal:  Positive for abdominal distention. Negative for blood in stool, constipation, diarrhea, nausea and vomiting.   Genitourinary: Negative.    Skin: Negative.        Objective   /84   Pulse 84   Resp 18   Ht 165.1 cm (65\")   Wt 85.8 kg (189 lb 4 oz)   SpO2 100%   BMI 31.49 kg/m²   Physical " Exam  Vitals reviewed.   Constitutional:       General: She is not in acute distress.     Appearance: Normal appearance.   HENT:      Nose: Nose normal.      Mouth/Throat:      Mouth: Mucous membranes are moist.   Eyes:      Conjunctiva/sclera: Conjunctivae normal.   Cardiovascular:      Rate and Rhythm: Normal rate and regular rhythm.      Pulses: Normal pulses.      Heart sounds: Normal heart sounds.   Pulmonary:      Effort: Pulmonary effort is normal.      Breath sounds: Normal breath sounds.   Abdominal:      General: Bowel sounds are normal. There is distension.      Palpations: Abdomen is soft.      Tenderness: There is no abdominal tenderness.   Musculoskeletal:      Cervical back: Normal range of motion and neck supple.   Skin:     General: Skin is warm and dry.   Neurological:      Mental Status: She is alert.   Psychiatric:         Mood and Affect: Mood normal.         Behavior: Behavior normal.         Assessment & Plan       Iron Deficiency Anemia  Chronic, currently symptomatic. Etiology unclear but possibly related to radiation enteritis or other GI losses.  PLAN:  - Investigations: Will follow-up iron studies closely. Colonoscopy planned for further investigation of GI tract as possible source of losses.  - Treatment: Continue oral iron supplementation. Increase to daily dosing and take with vitamin C. Encourage iron-rich foods. Monitor for signs/symptoms of worsening anemia or GI bleeding.  - Monitoring: Repeat CBC prior to colonoscopy. Recheck 1 month after colonoscopy to assess response to daily iron therapy.  - Patient education: Reviewed signs/symptoms to watch for that would indicate worsening anemia, including lightheadedness, shortness of breath, chest pain. Advised to call if any of these develop.  - Follow-up: Will assess response to daily iron therapy at next visit in 1 month, sooner if symptoms worsen. May need to consider IV iron infusions if no improvement.    Cervical Cancer s/p  Radiation with Radiation Damage to Digestive System   History of cervical cancer, currently in surveillance. Recent imaging concerning for radiation related changes vs possible recurrence.  PLAN:  - Investigations: Lymph node biopsy negative for malignancy. Awaiting return of additional stains. Will follow-up with oncologist to discuss imaging findings and management of right pelvic soft tissue density.  - Treatment: Continue surveillance.  - Referrals: Follow-up with radiation oncologist to review imaging findings and discuss management.  - Follow-up: After discussion with oncology and review of lymph node pathology.    Bowel Obstruction  Recently hospitalized and treated non-operatively. Likely related to radiation. Currently improving.  PLAN:  - Treatment: Continue low fiber diet. Advance as tolerated. Monitor bowel function closely.  - Patient education: Advised on foods to avoid and signs/symptoms of worsening obstruction to watch for. Educated on importance of staying hydrated.  - Follow-up: Will assess for resolution at next visit. Sooner if symptoms worsen.    Healthcare Maintenance  - Cologuard positive. Colonoscopy scheduled for further evaluation.  - Routine health maintenance, including vaccinations, deferred during recent illness. Will address once recovered.    TODAY:  - Medication adjustments: Increase iron supplementation to daily dosing. Hold for 1 week prior to colonoscopy.  - Monitoring instructions: Watch for signs/symptoms of worsening anemia or bowel obstruction. Ensure good oral intake and hydration.  - Lifestyle recommendations: Low fiber diet. Advance as tolerated.  - Referral plans: Follow-up with radiation oncology to review imaging and management. Proceed with colonoscopy as scheduled.

## 2024-10-11 NOTE — PROGRESS NOTES
The Lymph node biopsy has been reported Negative for malignancy/Lymphoma. Will further discuss with patient on follow up visit.

## 2024-10-14 ENCOUNTER — TELEPHONE (OUTPATIENT)
Dept: ORTHOPEDICS | Facility: OTHER | Age: 69
End: 2024-10-14
Payer: MEDICARE

## 2024-10-15 ENCOUNTER — TELEPHONE (OUTPATIENT)
Dept: ONCOLOGY | Facility: CLINIC | Age: 69
End: 2024-10-15
Payer: MEDICARE

## 2024-10-15 NOTE — TELEPHONE ENCOUNTER
I called pt to sched New pt Hosp f/u, but she said that her doctor already gave her the results and wasn't sure if she needed to be seen here or not. Pt stated she would like to wait to schedule an appt with Dr Ricardo until she finds out from her primary doctor if an appt is needed.

## 2024-10-17 ENCOUNTER — INPATIENT HOSPITAL (OUTPATIENT)
Age: 69
End: 2024-10-17
Payer: MEDICARE

## 2024-10-17 ENCOUNTER — APPOINTMENT (OUTPATIENT)
Dept: CT IMAGING | Facility: HOSPITAL | Age: 69
DRG: 389 | End: 2024-10-17
Payer: MEDICARE

## 2024-10-17 ENCOUNTER — APPOINTMENT (OUTPATIENT)
Dept: GENERAL RADIOLOGY | Facility: HOSPITAL | Age: 69
DRG: 389 | End: 2024-10-17
Payer: MEDICARE

## 2024-10-17 ENCOUNTER — HOSPITAL ENCOUNTER (INPATIENT)
Facility: HOSPITAL | Age: 69
LOS: 2 days | Discharge: HOME | DRG: 389 | End: 2024-10-19
Attending: INTERNAL MEDICINE | Admitting: INTERNAL MEDICINE
Payer: MEDICARE

## 2024-10-17 ENCOUNTER — INPATIENT HOSPITAL (OUTPATIENT)
Dept: URBAN - METROPOLITAN AREA HOSPITAL 84 | Facility: HOSPITAL | Age: 69
End: 2024-10-17
Payer: MEDICARE

## 2024-10-17 DIAGNOSIS — R14.0 ABDOMINAL DISTENSION (GASEOUS): ICD-10-CM

## 2024-10-17 DIAGNOSIS — R10.9 UNSPECIFIED ABDOMINAL PAIN: ICD-10-CM

## 2024-10-17 DIAGNOSIS — D64.9 ANEMIA, UNSPECIFIED: ICD-10-CM

## 2024-10-17 DIAGNOSIS — D72.829 ELEVATED WHITE BLOOD CELL COUNT, UNSPECIFIED: ICD-10-CM

## 2024-10-17 DIAGNOSIS — Z92.3 PERSONAL HISTORY OF IRRADIATION: ICD-10-CM

## 2024-10-17 DIAGNOSIS — R19.5 POSITIVE COLORECTAL CANCER SCREENING USING COLOGUARD TEST: ICD-10-CM

## 2024-10-17 DIAGNOSIS — K56.609 UNSPECIFIED INTESTINAL OBSTRUCTION, UNSPECIFIED AS TO PARTIA: ICD-10-CM

## 2024-10-17 DIAGNOSIS — R11.2 NAUSEA WITH VOMITING, UNSPECIFIED: ICD-10-CM

## 2024-10-17 DIAGNOSIS — Z85.41 PERSONAL HISTORY OF MALIGNANT NEOPLASM OF CERVIX UTERI: ICD-10-CM

## 2024-10-17 DIAGNOSIS — N17.9 ACUTE KIDNEY INJURY: ICD-10-CM

## 2024-10-17 DIAGNOSIS — K56.609 SMALL BOWEL OBSTRUCTION: Primary | ICD-10-CM

## 2024-10-17 DIAGNOSIS — R11.2 NAUSEA AND VOMITING, UNSPECIFIED VOMITING TYPE: ICD-10-CM

## 2024-10-17 LAB
ALBUMIN SERPL-MCNC: 4.6 G/DL (ref 3.5–5.2)
ALBUMIN/GLOB SERPL: 1.1 G/DL
ALP SERPL-CCNC: 92 U/L (ref 39–117)
ALT SERPL W P-5'-P-CCNC: 18 U/L (ref 1–33)
ANION GAP SERPL CALCULATED.3IONS-SCNC: 16.4 MMOL/L (ref 5–15)
AST SERPL-CCNC: 20 U/L (ref 1–32)
BACTERIA UR QL AUTO: ABNORMAL /HPF
BASOPHILS # BLD AUTO: 0.03 10*3/MM3 (ref 0–0.2)
BASOPHILS NFR BLD AUTO: 0.2 % (ref 0–1.5)
BILIRUB SERPL-MCNC: 0.5 MG/DL (ref 0–1.2)
BILIRUB UR QL STRIP: ABNORMAL
BUN SERPL-MCNC: 18 MG/DL (ref 8–23)
BUN/CREAT SERPL: 11.2 (ref 7–25)
CALCIUM SPEC-SCNC: 9.9 MG/DL (ref 8.6–10.5)
CHLORIDE SERPL-SCNC: 92 MMOL/L (ref 98–107)
CLARITY UR: ABNORMAL
CO2 SERPL-SCNC: 27.6 MMOL/L (ref 22–29)
COLOR UR: YELLOW
CREAT SERPL-MCNC: 1.61 MG/DL (ref 0.57–1)
D-LACTATE SERPL-SCNC: 1.4 MMOL/L (ref 0.5–2)
DEPRECATED RDW RBC AUTO: 49.1 FL (ref 37–54)
EGFRCR SERPLBLD CKD-EPI 2021: 34.9 ML/MIN/1.73
EOSINOPHIL # BLD AUTO: 0.01 10*3/MM3 (ref 0–0.4)
EOSINOPHIL NFR BLD AUTO: 0.1 % (ref 0.3–6.2)
ERYTHROCYTE [DISTWIDTH] IN BLOOD BY AUTOMATED COUNT: 16.3 % (ref 12.3–15.4)
GLOBULIN UR ELPH-MCNC: 4.1 GM/DL
GLUCOSE SERPL-MCNC: 138 MG/DL (ref 65–99)
GLUCOSE UR STRIP-MCNC: NEGATIVE MG/DL
HCT VFR BLD AUTO: 36.3 % (ref 34–46.6)
HGB BLD-MCNC: 11.7 G/DL (ref 12–15.9)
HGB UR QL STRIP.AUTO: ABNORMAL
HYALINE CASTS UR QL AUTO: ABNORMAL /LPF
IMM GRANULOCYTES # BLD AUTO: 0.06 10*3/MM3 (ref 0–0.05)
IMM GRANULOCYTES NFR BLD AUTO: 0.5 % (ref 0–0.5)
KETONES UR QL STRIP: ABNORMAL
LEUKOCYTE ESTERASE UR QL STRIP.AUTO: ABNORMAL
LIPASE SERPL-CCNC: 58 U/L (ref 13–60)
LYMPHOCYTES # BLD AUTO: 0.61 10*3/MM3 (ref 0.7–3.1)
LYMPHOCYTES NFR BLD AUTO: 4.8 % (ref 19.6–45.3)
MCH RBC QN AUTO: 26.5 PG (ref 26.6–33)
MCHC RBC AUTO-ENTMCNC: 32.2 G/DL (ref 31.5–35.7)
MCV RBC AUTO: 82.3 FL (ref 79–97)
MONOCYTES # BLD AUTO: 0.5 10*3/MM3 (ref 0.1–0.9)
MONOCYTES NFR BLD AUTO: 3.9 % (ref 5–12)
MUCOUS THREADS URNS QL MICRO: ABNORMAL /HPF
NEUTROPHILS NFR BLD AUTO: 11.5 10*3/MM3 (ref 1.7–7)
NEUTROPHILS NFR BLD AUTO: 90.5 % (ref 42.7–76)
NITRITE UR QL STRIP: NEGATIVE
NRBC BLD AUTO-RTO: 0 /100 WBC (ref 0–0.2)
PH UR STRIP.AUTO: 6 [PH] (ref 5–8)
PLATELET # BLD AUTO: 558 10*3/MM3 (ref 140–450)
PMV BLD AUTO: 8.7 FL (ref 6–12)
POTASSIUM SERPL-SCNC: 3.3 MMOL/L (ref 3.5–5.2)
PROT SERPL-MCNC: 8.7 G/DL (ref 6–8.5)
PROT UR QL STRIP: ABNORMAL
RBC # BLD AUTO: 4.41 10*6/MM3 (ref 3.77–5.28)
RBC # UR STRIP: ABNORMAL /HPF
REF LAB TEST METHOD: ABNORMAL
SODIUM SERPL-SCNC: 136 MMOL/L (ref 136–145)
SP GR UR STRIP: 1.02 (ref 1–1.03)
SQUAMOUS #/AREA URNS HPF: ABNORMAL /HPF
UROBILINOGEN UR QL STRIP: ABNORMAL
WBC # UR STRIP: ABNORMAL /HPF
WBC NRBC COR # BLD AUTO: 12.71 10*3/MM3 (ref 3.4–10.8)

## 2024-10-17 PROCEDURE — 83690 ASSAY OF LIPASE: CPT | Performed by: PHYSICIAN ASSISTANT

## 2024-10-17 PROCEDURE — 25810000003 SODIUM CHLORIDE 0.9 % SOLUTION: Performed by: PHYSICIAN ASSISTANT

## 2024-10-17 PROCEDURE — 99222 1ST HOSP IP/OBS MODERATE 55: CPT | Performed by: NURSE PRACTITIONER

## 2024-10-17 PROCEDURE — 74176 CT ABD & PELVIS W/O CONTRAST: CPT

## 2024-10-17 PROCEDURE — 83605 ASSAY OF LACTIC ACID: CPT | Performed by: SURGERY

## 2024-10-17 PROCEDURE — 85025 COMPLETE CBC W/AUTO DIFF WBC: CPT | Performed by: PHYSICIAN ASSISTANT

## 2024-10-17 PROCEDURE — 99285 EMERGENCY DEPT VISIT HI MDM: CPT

## 2024-10-17 PROCEDURE — 80053 COMPREHEN METABOLIC PANEL: CPT | Performed by: PHYSICIAN ASSISTANT

## 2024-10-17 PROCEDURE — 25010000002 METOCLOPRAMIDE PER 10 MG: Performed by: PHYSICIAN ASSISTANT

## 2024-10-17 PROCEDURE — 81001 URINALYSIS AUTO W/SCOPE: CPT | Performed by: PHYSICIAN ASSISTANT

## 2024-10-17 PROCEDURE — 74018 RADEX ABDOMEN 1 VIEW: CPT

## 2024-10-17 RX ORDER — DEXTROSE MONOHYDRATE, SODIUM CHLORIDE, AND POTASSIUM CHLORIDE 50; 1.49; 9 G/1000ML; G/1000ML; G/1000ML
125 INJECTION, SOLUTION INTRAVENOUS CONTINUOUS
Status: DISCONTINUED | OUTPATIENT
Start: 2024-10-17 | End: 2024-10-19

## 2024-10-17 RX ORDER — DIPHENHYDRAMINE HYDROCHLORIDE 25 MG/1
50 CAPSULE ORAL DAILY
Status: DISCONTINUED | OUTPATIENT
Start: 2024-10-18 | End: 2024-10-19 | Stop reason: HOSPADM

## 2024-10-17 RX ORDER — ONDANSETRON 4 MG/1
4 TABLET, ORALLY DISINTEGRATING ORAL EVERY 6 HOURS PRN
Status: DISCONTINUED | OUTPATIENT
Start: 2024-10-17 | End: 2024-10-19 | Stop reason: HOSPADM

## 2024-10-17 RX ORDER — ONDANSETRON 2 MG/ML
4 INJECTION INTRAMUSCULAR; INTRAVENOUS EVERY 6 HOURS PRN
Status: DISCONTINUED | OUTPATIENT
Start: 2024-10-17 | End: 2024-10-19 | Stop reason: HOSPADM

## 2024-10-17 RX ORDER — SODIUM CHLORIDE 0.9 % (FLUSH) 0.9 %
10 SYRINGE (ML) INJECTION AS NEEDED
Status: DISCONTINUED | OUTPATIENT
Start: 2024-10-17 | End: 2024-10-19 | Stop reason: HOSPADM

## 2024-10-17 RX ORDER — MORPHINE SULFATE 2 MG/ML
2 INJECTION, SOLUTION INTRAMUSCULAR; INTRAVENOUS EVERY 4 HOURS PRN
Status: DISCONTINUED | OUTPATIENT
Start: 2024-10-17 | End: 2024-10-19 | Stop reason: HOSPADM

## 2024-10-17 RX ORDER — PANTOPRAZOLE SODIUM 40 MG/10ML
40 INJECTION, POWDER, LYOPHILIZED, FOR SOLUTION INTRAVENOUS ONCE
Status: COMPLETED | OUTPATIENT
Start: 2024-10-17 | End: 2024-10-17

## 2024-10-17 RX ORDER — ONDANSETRON 4 MG/1
4 TABLET, ORALLY DISINTEGRATING ORAL EVERY 6 HOURS PRN
COMMUNITY

## 2024-10-17 RX ORDER — MULTIPLE VITAMINS W/ MINERALS TAB 9MG-400MCG
1 TAB ORAL DAILY
Status: DISCONTINUED | OUTPATIENT
Start: 2024-10-18 | End: 2024-10-19 | Stop reason: HOSPADM

## 2024-10-17 RX ORDER — UREA 10 %
500 LOTION (ML) TOPICAL DAILY
Status: DISCONTINUED | OUTPATIENT
Start: 2024-10-18 | End: 2024-10-19 | Stop reason: HOSPADM

## 2024-10-17 RX ORDER — LIDOCAINE HYDROCHLORIDE 20 MG/ML
5 SOLUTION OROPHARYNGEAL
Status: DISCONTINUED | OUTPATIENT
Start: 2024-10-17 | End: 2024-10-19 | Stop reason: HOSPADM

## 2024-10-17 RX ORDER — METOCLOPRAMIDE HYDROCHLORIDE 5 MG/ML
10 INJECTION INTRAMUSCULAR; INTRAVENOUS ONCE
Status: COMPLETED | OUTPATIENT
Start: 2024-10-17 | End: 2024-10-17

## 2024-10-17 RX ADMIN — LIDOCAINE HYDROCHLORIDE 5 ML: 20 SOLUTION ORAL at 15:11

## 2024-10-17 RX ADMIN — SODIUM CHLORIDE 500 ML: 9 INJECTION, SOLUTION INTRAVENOUS at 12:46

## 2024-10-17 RX ADMIN — METOCLOPRAMIDE 10 MG: 5 INJECTION, SOLUTION INTRAMUSCULAR; INTRAVENOUS at 10:51

## 2024-10-17 RX ADMIN — DEXTROSE MONOHYDRATE, SODIUM CHLORIDE, AND POTASSIUM CHLORIDE 125 ML/HR: 50; 9; 1.49 INJECTION, SOLUTION INTRAVENOUS at 18:30

## 2024-10-17 RX ADMIN — PANTOPRAZOLE SODIUM 40 MG: 40 INJECTION, POWDER, FOR SOLUTION INTRAVENOUS at 15:10

## 2024-10-17 RX ADMIN — TOPICAL ANESTHETIC 1 SPRAY: 200 SPRAY DENTAL; PERIODONTAL at 15:11

## 2024-10-17 NOTE — Clinical Note
Level of Care: Telemetry [5]   Admitting Physician: DANTE MATHUR [[id number]]   Attending Physician: DANTE MATHUR [[id number]]

## 2024-10-17 NOTE — CASE MANAGEMENT/SOCIAL WORK
Discharge Planning Assessment  [facility]     Patient Name: Marilyn Pineda  MRN: [id number]  Today's Date: 10/17/2024    Admit Date: 10/17/2024    Plan: Home   Discharge Needs Assessment       Row Name 10/17/24 1268       Living Environment    People in Home spouse;child(jerrica), adult;other relative(s)    Name(s) of People in Home Spouse Nasim, son, daughter in law and 5 yr granddaughter    Current Living Arrangements home    Potentially Unsafe Housing Conditions none    In the past 12 months has the electric, gas, oil, or water company threatened to shut off services in your home? No    Primary Care Provided by self    Provides Primary Care For no one    Family Caregiver if Needed child(jerrica), adult    Family Caregiver Names No names given per pt.    Quality of Family Relationships helpful;supportive;involved    Able to Return to Prior Arrangements yes       Resource/Environmental Concerns    Resource/Environmental Concerns none    Transportation Concerns none       Transportation Needs    In the past 12 months, has lack of transportation kept you from medical appointments or from getting medications? no    In the past 12 months, has lack of transportation kept you from meetings, work, or from getting things needed for daily living? No       Food Insecurity    Within the past 12 months, you worried that your food would run out before you got the money to buy more. Never true    Within the past 12 months, the food you bought just didn't last and you didn't have money to get more. Never true       Transition Planning    Patient/Family Anticipates Transition to home with family    Patient/Family Anticipated Services at Transition none    Transportation Anticipated family or friend will provide  Pt. states she has plenty of people to transport her home. No names given       Discharge Needs Assessment    Readmission Within the Last 30 Days no previous admission in last 30 days    Equipment Currently Used at Home walker,  rolling;cane, straight    Do you want help finding or keeping work or a job? I do not need or want help    Do you want help with school or training? For example, starting or completing job training or getting a high school diploma, GED or equivalent No              Discharge Plan       Row Name 10/17/24 1858       Plan    Plan Home    Patient/Family in Agreement with Plan yes    Plan Comments CM spoke to ptKira Sanders at bedside. PCP and pharmacy confirmed. Pt. denies financial, medication or transportation issues. Pt. stated she has plenty of people that can transport her home at d/c. No names given. Pt. agrees to M2B. DC barriers: IV meds.              Demographic Summary       Row Name 10/17/24 1852       General Information    Admission Type inpatient    Arrived From home    Required Notices Provided Important Message from Medicare    Referral Source admission list    Reason for Consult discharge planning    Preferred Language English       Contact Information    Permission Granted to Share Info With     Contact Information Obtained for                    Functional Status       Row Name 10/17/24 1853       Functional Status    Usual Activity Tolerance good    Current Activity Tolerance moderate       Physical Activity    On average, how many days per week do you engage in moderate to strenuous exercise (like a brisk walk)? 7 days    On average, how many minutes do you engage in exercise at this level? 130 min    Number of minutes of exercise per week 910       Functional Status, IADL    Medications independent    Meal Preparation independent    Housekeeping independent    Laundry independent    Shopping independent    If for any reason you need help with day-to-day activities such as bathing, preparing meals, shopping, managing finances, etc., do you get the help you need? I don't need any help                Patient Forms       Row Name 10/17/24 7786       Patient Forms    Important Message  from Medicare ([facility]) Delivered  Per registration 10/17.2024                      Tiffani Lemus RN    Office: [phone]  Fax: [phone]  [email or web address]       Yes

## 2024-10-17 NOTE — ED NOTES
Nursing report ED to floor  Marilyn Pineda  68 y.o.  female    HPI:   Chief Complaint   Patient presents with    Abdominal Pain     Abd pain        Admitting doctor:   Reginald Diop MD    Admitting diagnosis:   The primary encounter diagnosis was Small bowel obstruction. Diagnoses of Nausea and vomiting, unspecified vomiting type and Acute kidney injury were also pertinent to this visit.    Code status:   Current Code Status       Date Active Code Status Order ID Comments User Context       10/17/2024 1322 CPR (Attempt to Resuscitate) [id number]  Samantha Bobo APRN ED        Question Answer    Code Status (Patient has no pulse and is not breathing) CPR (Attempt to Resuscitate)    Medical Interventions (Patient has pulse or is breathing) Full Support                    Allergies:   Augmentin [amoxicillin-pot clavulanate]    Isolation:  No active isolations     Fall Risk:  Fall Risk Assessment was completed, and patient is at low risk for falls.   Predictive Model Details         7 (Low) Factor Value    Calculated 10/17/2024 15:35 Age 68    Risk of Fall Model Active Peripheral IV Present     Imaging order in this encounter Present     Magnesium not on file     Number of Distinct Medication Classes administered 4     Chloride 92 mmol/L     Creatinine 1.61 mg/dL     Brent Scale not on file     Number of administrations of Ulcer Drugs 1     Respiratory Rate 16     Diastolic BP 98     Total Bilirubin 0.5 mg/dL     Days after Admission 0.227     ALT 18 U/L     Albumin 4.6 g/dL     Potassium 3.3 mmol/L     Calcium 9.9 mg/dL         Weight:       10/17/24  1008   Weight: 86.3 kg (190 lb 4.1 oz)       Intake and Output    Intake/Output Summary (Last 24 hours) at 10/17/2024 1539  Last data filed at 10/17/2024 1525  Gross per 24 hour   Intake --   Output 2700 ml   Net -2700 ml       Diet:   Dietary Orders (From admission, onward)       Start     Ordered    10/17/24 1322  NPO Diet NPO Type: Strict NPO  Diet Effective Now         Question:  NPO Type  Answer:  Strict NPO    10/17/24 1321                     Most recent vitals:   Vitals:    10/17/24 1119 10/17/24 1219 10/17/24 1419 10/17/24 1527   BP: 159/92 171/98 (!) 153/107 143/83   Pulse: 81 79 87 92   Resp:       Temp:       SpO2: 98% 100%  96%   Weight:       Height:           Active LDAs/IV Access:   Lines, Drains & Airways       Active LDAs       Name Placement date Placement time Site Days    Peripheral IV 10/17/24 1051 Anterior;Right Forearm 10/17/24  1051  Forearm  less than 1    NG/OG Tube Nasogastric 18 Fr Right nostril 10/17/24  1513  Right nostril  less than 1                    Skin Condition:   Skin Assessments (last day)       None             Labs (abnormal labs have a star):   Labs Reviewed   COMPREHENSIVE METABOLIC PANEL - Abnormal; Notable for the following components:       Result Value    Glucose 138 (*)     Creatinine 1.61 (*)     Potassium 3.3 (*)     Chloride 92 (*)     Total Protein 8.7 (*)     Anion Gap 16.4 (*)     eGFR 34.9 (*)     All other components within normal limits    Narrative:     GFR Normal >60  Chronic Kidney Disease <60  Kidney Failure <15     URINALYSIS W/ MICROSCOPIC IF INDICATED (NO CULTURE) - Abnormal; Notable for the following components:    Appearance, UA Slightly Cloudy (*)     Ketones, UA Trace (*)     Bilirubin, UA Moderate (2+) (*)     Blood, UA Trace (*)     Protein,  mg/dL (2+) (*)     Leuk Esterase, UA Trace (*)     All other components within normal limits   CBC WITH AUTO DIFFERENTIAL - Abnormal; Notable for the following components:    WBC 12.71 (*)     Hemoglobin 11.7 (*)     MCH 26.5 (*)     RDW 16.3 (*)     Platelets 558 (*)     Neutrophil % 90.5 (*)     Lymphocyte % 4.8 (*)     Monocyte % 3.9 (*)     Eosinophil % 0.1 (*)     Neutrophils, Absolute 11.50 (*)     Lymphocytes, Absolute 0.61 (*)     Immature Grans, Absolute 0.06 (*)     All other components within normal limits   URINALYSIS, MICROSCOPIC ONLY - Abnormal; Notable  for the following components:    Squamous Epithelial Cells, UA 3-6 (*)     Mucus, UA Small/1+ (*)     All other components within normal limits   LIPASE - Normal   LACTIC ACID, PLASMA   CBC AND DIFFERENTIAL    Narrative:     The following orders were created for panel order CBC & Differential.  Procedure                               Abnormality         Status                     ---------                               -----------         ------                     CBC Auto Differential[[id number]]        Abnormal            Final result                 Please view results for these tests on the individual orders.       LOC: Person, Place, Time, and Situation    Telemetry:  Med/Surg    Cardiac Monitoring Ordered: no    EKG:   No orders to display       Medications Given in the ED:   Medications   sodium chloride 0.9 % flush 10 mL (has no administration in time range)   Potassium Replacement - Follow Nurse / BPA Driven Protocol (has no administration in time range)   Magnesium Standard Dose Replacement - Follow Nurse / BPA Driven Protocol (has no administration in time range)   Phosphorus Replacement - Follow Nurse / BPA Driven Protocol (has no administration in time range)   Calcium Replacement - Follow Nurse / BPA Driven Protocol (has no administration in time range)   ondansetron ODT (ZOFRAN-ODT) disintegrating tablet 4 mg (has no administration in time range)     Or   ondansetron (ZOFRAN) injection 4 mg (has no administration in time range)   dextrose 5 % and sodium chloride 0.9 % with KCl 20 mEq/L infusion (has no administration in time range)   morphine injection 2 mg (has no administration in time range)   Lidocaine Viscous HCl (XYLOCAINE) 2 % solution 5 mL (5 mL Nasal Given 10/17/24 1511)   metoclopramide (REGLAN) injection 10 mg (10 mg Intravenous Given 10/17/24 1051)   sodium chloride 0.9 % bolus 500 mL (0 mL Intravenous Stopped 10/17/24 1524)   pantoprazole (PROTONIX) injection 40 mg (40 mg Intravenous  Given 10/17/24 1510)   benzocaine (HURRICAINE) 20 % liquid solution 1 spray (1 spray Mouth/Throat Given 10/17/24 1511)       Imaging results:  CT Abdomen Pelvis Without Contrast    Result Date: 10/17/2024  1. Findings consistent with high-grade small bowel obstruction related to distal ileal narrowing/stricture in the right lower quadrant of the abdomen. The small bowel dilation appears increased since 10/6/2024. 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 2. Right hydronephrosis appears diminished since 10/6/2024. The right kidney is severely atrophic. 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 3. 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 Stable appearance of mildly enlarged right inguinal lymph node. This represents the node which underwent previous biopsy with benign findings. 5. Fluid distention of the thoracic esophagus and significant fluid distention of the stomach. Consider NG tube decompression. Electronically Signed: Michelle Hawkins MD  10/17/2024 12:11 PM EDT  Workstation ID: [id number]     Social issues:   Social History     Socioeconomic History    Marital status:    Tobacco Use    Smoking status: Never    Smokeless tobacco: Never   Vaping Use    Vaping status: Never Used   Substance and Sexual Activity    Alcohol use: Not Currently     Comment: may drink a little wine but only very occasionally    Drug use: No    Sexual activity: Yes     Partners: Male     Birth control/protection: None     Comment: have had hysterectomy       NIH Stroke Scale:  Interval: (not recorded)  1a. Level of Consciousness: (not recorded)  1b. LOC Questions: (not recorded)  1c. LOC Commands: (not recorded)  2. Best Gaze: (not recorded)  3. Visual: (not recorded)  4. Facial Palsy: (not recorded)  5a. Motor Arm, Left: (not recorded)  5b.  Motor Arm, Right: (not recorded)  6a. Motor Leg, Left: (not recorded)  6b. Motor Leg, Right: (not recorded)  7. Limb Ataxia: (not recorded)  8. Sensory: (not recorded)  9. Best Language: (not recorded)  10. Dysarthria: (not recorded)  11. Extinction and Inattention (formerly Neglect): (not recorded)    Total (NIH Stroke Scale): (not recorded)     Additional notable assessment information:     Nursing report ED to floor:  GRACIELA Gómez RN   10/17/24 15:39 EDT

## 2024-10-17 NOTE — H&P
"[facility] Medicine Services  History & Physical    Patient Name: Marilyn Pineda  : 1955  MRN: [id number]  Primary Care Physician:  Cinthia Brown MD  Date of admission: 10/17/2024  Date and Time of Service: 10/17/2024 at 1325    Subjective      Chief Complaint: abdominal pain     History of Present Illness: Marilyn Pineda is a 68 y.o. female with a PMH of anemia, small bowel obstruction, arthritis, anemia and cervical cancer s/p hysterectomy who presented to [facility] on 10/17/2024 with abdominal pain and bloating. She is also having nausea, vomiting, and heartburn. She had bowel prep yesterday for a colonoscopy, and has a recent history of a small bowel obstruction for which she was seen and treated here.     On ED evaluation, potassium 3.3,  WBC 12.71, Ct abdomen shows \" Findings consistent with high-grade small bowel obstruction related to distal ileal narrowing/stricture in the right lower quadrant of the abdomen. The small bowel dilation appears increased since 10/6/2024. 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 Right hydronephrosis appears diminished since 10/6/2024. The right kidney is severely atrophic. 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 Stable appearance of mildly enlarged right inguinal lymph node. This represents the node which underwent previous biopsy with benign findings. Fluid distention of the thoracic esophagus and significant fluid distention of the stomach. Consider NG tube decompression. \" General surgery was called, who recommended NG tube. Hospitalist team to admit for further medical management.       Review of Systems   Constitutional:  Positive for fatigue. Negative for chills and fever.   Gastrointestinal:  Positive " for abdominal distention, abdominal pain, nausea and vomiting.       Personal History     Past Medical History:   Diagnosis Date    Allergic     amoxicilin clayvix    Anemia     Arthritis 2024    In right  foot    Cancer 1993    cervial cancer/ radiation and had hysterectomy    Pain of right hip joint 2018    Right lower lobe pneumonia 2018    Small bowel obstruction 10/2024    Thrush, oral 2018       Past Surgical History:   Procedure Laterality Date     SECTION  1984    HAND SURGERY      HYSTERECTOMY      US GUIDED LYMPH NODE BIOPSY  10/8/2024       Family History: family history includes COPD in her mother; Cancer in her maternal grandmother; Miscarriages / Stillbirths in her mother; Thyroid disease in her father. Otherwise pertinent FHx was reviewed and not pertinent to current issue.    Social History:  reports that she has never smoked. She has never used smokeless tobacco. She reports that she does not currently use alcohol. She reports that she does not use drugs.    Home Medications:  Prior to Admission Medications       Prescriptions Last Dose Informant Patient Reported? Taking?    BIOTIN PO Past Week  Yes Yes    Take 1 tablet by mouth Daily.    cyanocobalamin (VITAMIN B-12) 2500 MCG tablet tablet Past Week  Yes Yes    Take 500 mcg by mouth Daily.    Ferrous Sulfate (IRON PO) Past Week  Yes Yes    Take 65 mg by mouth Daily.    Misc Natural Products (BEET ROOT PO) Past Week  Yes Yes    Take 500 mg by mouth Daily.    Multiple Minerals-Vitamins (Calcium-Magnesium-Zinc-D3) tablet Past Week  Yes Yes    Take 1 tablet by mouth Daily.    multivitamin with minerals (MULTIVITAMIN WOMEN PO) Past Week  Yes Yes    Take 1 tablet by mouth Daily.    ondansetron ODT (ZOFRAN-ODT) 4 MG disintegrating tablet 10/17/2024  Yes Yes    Place 1 tablet on the tongue Every 6 (Six) Hours As Needed for Nausea or Vomiting.    vitamin B-6 (PYRIDOXINE) 50 MG tablet Past Week  Yes Yes    Take 2  tablets by mouth Daily.              Allergies:  Allergies   Allergen Reactions    Augmentin [Amoxicillin-Pot Clavulanate] Hives       Objective      Vitals:   Temp:  [98.2 °F (36.8 °C)] 98.2 °F (36.8 °C)  Heart Rate:  [] 79  Resp:  [16] 16  BP: (142-171)/(88-98) 171/98  Body mass index is 31.7 kg/m².  Physical Exam  Vitals and nursing note reviewed.   Constitutional:       Appearance: Normal appearance.   HENT:      Head: Normocephalic and atraumatic.      Nose: Nose normal.      Mouth/Throat:      Mouth: Mucous membranes are moist.   Eyes:      Extraocular Movements: Extraocular movements intact.      Pupils: Pupils are equal, round, and reactive to light.   Cardiovascular:      Rate and Rhythm: Normal rate and regular rhythm.      Pulses: Normal pulses.   Pulmonary:      Effort: Pulmonary effort is normal.      Breath sounds: Normal breath sounds.   Abdominal:      General: Bowel sounds are decreased. There is distension.      Palpations: Abdomen is soft.      Tenderness: There is abdominal tenderness. There is no guarding.   Musculoskeletal:         General: Normal range of motion.      Cervical back: Normal range of motion and neck supple.   Skin:     General: Skin is warm.      Capillary Refill: Capillary refill takes less than 2 seconds.   Neurological:      Mental Status: She is alert and oriented to person, place, and time.       Diagnostic Data:  Lab Results (last 24 hours)       Procedure Component Value Units Date/Time    Comprehensive Metabolic Panel [[id number]]  (Abnormal) Collected: 10/17/24 1049    Specimen: Blood Updated: 10/17/24 1232     Glucose 138 mg/dL      BUN 18 mg/dL      Creatinine 1.61 mg/dL      Sodium 136 mmol/L      Potassium 3.3 mmol/L      Chloride 92 mmol/L      CO2 27.6 mmol/L      Calcium 9.9 mg/dL      Total Protein 8.7 g/dL      Albumin 4.6 g/dL      ALT (SGPT) 18 U/L      AST (SGOT) 20 U/L      Alkaline Phosphatase 92 U/L      Total Bilirubin 0.5 mg/dL      Globulin 4.1  gm/dL      A/G Ratio 1.1 g/dL      BUN/Creatinine Ratio 11.2     Anion Gap 16.4 mmol/L      eGFR 34.9 mL/min/1.73     Narrative:      GFR Normal >60  Chronic Kidney Disease <60  Kidney Failure <15      Lipase [[id number]]  (Normal) Collected: 10/17/24 1049    Specimen: Blood Updated: 10/17/24 1232     Lipase 58 U/L     CBC & Differential [[id number]]  (Abnormal) Collected: 10/17/24 1049    Specimen: Blood Updated: 10/17/24 1057    Narrative:      The following orders were created for panel order CBC & Differential.  Procedure                               Abnormality         Status                     ---------                               -----------         ------                     CBC Auto Differential[[id number]]        Abnormal            Final result                 Please view results for these tests on the individual orders.    CBC Auto Differential [[id number]]  (Abnormal) Collected: 10/17/24 1049    Specimen: Blood Updated: 10/17/24 1057     WBC 12.71 10*3/mm3      RBC 4.41 10*6/mm3      Hemoglobin 11.7 g/dL      Hematocrit 36.3 %      MCV 82.3 fL      MCH 26.5 pg      MCHC 32.2 g/dL      RDW 16.3 %      RDW-SD 49.1 fl      MPV 8.7 fL      Platelets 558 10*3/mm3      Neutrophil % 90.5 %      Lymphocyte % 4.8 %      Monocyte % 3.9 %      Eosinophil % 0.1 %      Basophil % 0.2 %      Immature Grans % 0.5 %      Neutrophils, Absolute 11.50 10*3/mm3      Lymphocytes, Absolute 0.61 10*3/mm3      Monocytes, Absolute 0.50 10*3/mm3      Eosinophils, Absolute 0.01 10*3/mm3      Basophils, Absolute 0.03 10*3/mm3      Immature Grans, Absolute 0.06 10*3/mm3      nRBC 0.0 /100 WBC     Urinalysis, Microscopic Only - Urine, Clean Catch [[id number]]  (Abnormal) Collected: 10/17/24 1044    Specimen: Urine, Clean Catch Updated: 10/17/24 1055     RBC, UA 0-2 /HPF      WBC, UA 0-2 /HPF      Bacteria, UA None Seen /HPF      Squamous Epithelial Cells, UA 3-6 /HPF      Hyaline Casts, UA None Seen /LPF      Mucus, UA Small/1+ /HPF       Methodology Automated Microscopy    Urinalysis With Microscopic If Indicated (No Culture) - Urine, Clean Catch [[id number]]  (Abnormal) Collected: 10/17/24 1044    Specimen: Urine, Clean Catch Updated: 10/17/24 1052     Color, UA Yellow     Appearance, UA Slightly Cloudy     Comment: Result checked          pH, UA 6.0     Specific Gravity, UA 1.020     Glucose, UA Negative     Ketones, UA Trace     Bilirubin, UA Moderate (2+)     Comment: Confirmation testing is unavailable.  A serum bilirubin is recommended for further assessment.        Blood, UA Trace     Protein,  mg/dL (2+)     Leuk Esterase, UA Trace     Nitrite, UA Negative     Urobilinogen, UA 1.0 E.U./dL             Imaging Results (Last 24 Hours)       Procedure Component Value Units Date/Time    CT Abdomen Pelvis Without Contrast [[id number]] Collected: 10/17/24 1156     Updated: 10/17/24 1213    Narrative:      CT ABDOMEN PELVIS WO CONTRAST    Date of Exam: 10/17/2024 11:43 AM EDT    Indication: abdominal pain n/v. History of cervical cancer with hysterectomy and radiation therapy. Chronic right lower extremity lymphedema. History of right inguinal lymph node biopsy 10/8/2024 with benign findings.    Comparison: CT abdomen and pelvis 10/6/2024.    Technique: Axial CT images were obtained of the abdomen and pelvis without the administration of contrast. Sagittal and coronal reconstructions were performed.  Automated exposure control and iterative reconstruction methods were used.      Findings:  Hysterectomy changes are again noted. Ill-defined soft tissue thickening is demonstrated in the right hemipelvis retroperitoneum extending along the pelvic sidewall and contiguous to the iliac muscle. Soft tissue thickening is a favored to reflect   changes of radiation induced fibrosis, and appears very slightly diminished in thickness since 10/6/2024    There is distal ileal/right lower quadrant small bowel loop thickening with caliber narrowing (series  3 image 124). Some of the thickened or inflamed small bowel loops described in the prior examination no longer appear abnormally thickened. The upstream   small bowel loops are abnormally dilated with air-fluid levels, consistent with the appearance of small bowel obstruction. The small bowel dilation appears increased since the 10/6/2024 examination. There is abnormal fluid distention of the stomach    The right kidney is severely atrophic with hydronephrosis, thought to be related to distal ureteral obstruction by aforementioned pelvic mass or fibrosis the severe right hydronephrosis appears diminished since the prior study. Left kidney appears within   normal limits.    Noncontrast appearance of the liver, gallbladder, spleen, pancreas, adrenals is within normal limits.    Urinary bladder is decompressed. Rectum is normal.    Mildly enlarged right inguinal lymph node appears stable, 2.6 x 1.2 cm. No new or progressive adenopathy is seen.    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 No new osseous abnormalities.    Lung bases are clear. Benign calcified nodule in the right middle lobe. Normal heart size. Mild coronary artery calcifications. There is fluid distention of the lower thoracic esophagus      Impression:      1. Findings consistent with high-grade small bowel obstruction related to distal ileal narrowing/stricture in the right lower quadrant of the abdomen. The small bowel dilation appears increased since 10/6/2024. 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  2. Right hydronephrosis appears diminished since 10/6/2024. The right kidney is severely atrophic. 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  3. 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 Stable appearance of mildly enlarged right inguinal lymph node. This represents the node which underwent previous biopsy with benign findings.  5. Fluid distention of the thoracic esophagus and significant fluid distention of the stomach. Consider NG tube decompression.    Electronically Signed: Michelle Hawkins MD    10/17/2024 12:11 PM EDT    Workstation ID: [id number]              Assessment & Plan        This is a 68 y.o. female with:    Active and Resolved Problems  Active Hospital Problems    Diagnosis  POA    **Small bowel obstruction [K56.609]  Yes      Resolved Hospital Problems   No resolved problems to display.       Small bowel obstruction   - CT scan: SBO in lower right quadrant   - NG tube ordered by ED  - pt states she had small bm today  - not passing flatus   - NPO diet  - restart any home meds once no longer NPO  - morphine prn for pain  - zofran for nausea   - protonix for stomach protection   - general surgery to see and evaluate    History of Anemia   - Hgb 11.7  - No overt s/sx of bleeding   - Continue to monitor CBC  - Transfuse if Hgb < 7        VTE Prophylaxis:  Mechanical VTE prophylaxis orders are present.        The patient desires to be as follows:    CODE STATUS:    Code Status (Patient has no pulse and is not breathing): CPR (Attempt to Resuscitate)  Medical Interventions (Patient has pulse or is breathing): Full Support        Nasim Pineda, who can be contacted at [phone], is the designated person to make medical decisions on the patient's behalf if She is incapable of doing so. This was clarified with patient and/or next of kin on 10/17/2024 during the course of this H&P.    Admission Status:  I believe this patient meets inpatient status.    Expected Length of Stay: > 24 hours     PDMP and Medication Dispenses via Sidebar reviewed and consistent with patient reported medications.    I discussed the patient's findings and my  recommendations with patient and family.      Signature:     This document has been electronically signed by HANNAH Flannery on October 17, 2024 13:22 EDT   [facility], [place], [facility]

## 2024-10-17 NOTE — CONSULTS
GI CONSULT  NOTE:    Referring Provider:  Dr. Diop    Chief complaint: Abdominal pain, nausea/vomiting    Subjective .     History of present illness: Marilyn Pineda is a 68 y.o. female with history of cervical cancer s/p radiation and hysterectomy who presents with complaints of nausea/vomiting and abdominal pain.  The patient had been scheduled for outpatient colonoscopy today at [facility] due to positive Cologuard testing.  However, the patient had difficulty tolerating prep and began having abdominal pain with feculent nausea/vomiting.  Her procedure was canceled and she was instructed to present to the ER.  The patient reports that she was unable to complete bowel prep secondary to her nausea/vomiting.  States that emesis was brown in color.  No bright red hematemesis.  Also reports frequent belching.  States that she has had significant abdominal bloating since starting bowel purge.  Generalized abdominal pain that is cramping in nature.  She states that she has had bowel movements with bowel prep and stool has been pasty in consistency.  She denies any bright red blood per rectum or melena.   Of note, the patient was recently admitted earlier this month for abdominal pain and nausea/vomiting.  At that time, CT abdomen/pelvis on 10/6 showed distal small bowel obstruction with transition at the terminal ileum.  Her symptoms resolved with medical management and no surgical intervention was taken at that time.      Endo History:  No previous EGD/colonoscopy.    Past Medical History:  Past Medical History:   Diagnosis Date    Allergic     amoxicilin clayvix    Anemia     Arthritis 07/17/2024    In right  foot    Cancer 1993    cervial cancer/ radiation and had hysterectomy    Pain of right hip joint 01/09/2018    Right lower lobe pneumonia 01/09/2018    Small bowel obstruction 10/2024    Thrush, oral 01/18/2018       Past Surgical History:  Past Surgical History:   Procedure Laterality Date      SECTION  1984    HAND SURGERY      HYSTERECTOMY      US GUIDED LYMPH NODE BIOPSY  10/8/2024       Social History:  Social History     Tobacco Use    Smoking status: Never    Smokeless tobacco: Never   Vaping Use    Vaping status: Never Used   Substance Use Topics    Alcohol use: Not Currently     Comment: may drink a little wine but only very occasionally    Drug use: No       Family History:  Family History   Problem Relation Age of Onset    COPD Mother         from smoking for years    Miscarriages / Stillbirths Mother         had a miscarriage    Thyroid disease Father         is taking thyroid pil;    Cancer Maternal Grandmother         had lung cancer       Medications:  (Not in a hospital admission)      Scheduled Meds:   Continuous Infusions:dextrose 5 % and sodium chloride 0.9 % with KCl 20 mEq, 125 mL/hr      PRN Meds:.  Calcium Replacement - Follow Nurse / BPA Driven Protocol    Magnesium Standard Dose Replacement - Follow Nurse / BPA Driven Protocol    ondansetron ODT **OR** ondansetron    Phosphorus Replacement - Follow Nurse / BPA Driven Protocol    Potassium Replacement - Follow Nurse / BPA Driven Protocol    sodium chloride    ALLERGIES:  Augmentin [amoxicillin-pot clavulanate]    ROS:  The following systems were reviewed and negative;   Constitution:  No fevers, chills, no unintentional weight loss  Skin: no rash, no jaundice  Eyes:  No blurry vision, no eye pain  HENT:  No change in hearing or smell  Resp:  No dyspnea or cough  CV:  No chest pain or palpitations  :  No dysuria, hematuria  Musculoskeletal:  No leg cramps or arthralgias  Neuro:  No tremor, no numbness  Psych:  No depression or confusion    Objective     Vital Signs:   Vitals:    10/17/24 1020 10/17/24 1049 10/17/24 1119 10/17/24 1219   BP: 147/88 142/90 159/92 171/98   Pulse: 101 88 81 79   Resp:       Temp:       SpO2: 99% 97% 98% 100%   Weight:       Height:           Physical Exam:       General Appearance:     "Awake and alert, in no acute distress   Head:    Normocephalic, without obvious abnormality, atraumatic   Throat:   No oral lesions, no thrush, oral mucosa moist   Lungs:     Respirations regular, even and unlabored   Chest Wall:    No abnormalities observed   Abdomen:     Soft, generalized tenderness, no rebound or guarding, moderate distention   Rectal:     Deferred   Extremities:   Moves all extremities, no edema, no cyanosis   Pulses:   Pulses palpable and equal bilaterally   Skin:   No rash, no jaundice, normal palpation   Lymph nodes:   No cervical, supraclavicular or submandibular palpable adenopathy   Neurologic:   Cranial nerves 2 - 12 grossly intact, no asterixis       Results Review:   I reviewed the patient's labs and imaging.  CBC    Results from last 7 days   Lab Units 10/17/24  1049   WBC 10*3/mm3 12.71*   HEMOGLOBIN g/dL 11.7*   PLATELETS 10*3/mm3 558*     CMP   Results from last 7 days   Lab Units 10/17/24  1049   SODIUM mmol/L 136   POTASSIUM mmol/L 3.3*   CHLORIDE mmol/L 92*   CO2 mmol/L 27.6   BUN mg/dL 18   CREATININE mg/dL 1.61*   GLUCOSE mg/dL 138*   ALBUMIN g/dL 4.6   BILIRUBIN mg/dL 0.5   ALK PHOS U/L 92   AST (SGOT) U/L 20   ALT (SGPT) U/L 18   LIPASE U/L 58     Cr Clearance Estimated Creatinine Clearance: 36.8 mL/min (A) (by C-G formula based on SCr of 1.61 mg/dL (H)).  Coag     HbA1C   Lab Results   Component Value Date    HGBA1C 5.20 05/24/2024     Blood Glucose No results found for: \"POCGLU\"  Infection     UA    Results from last 7 days   Lab Units 10/17/24  1044   NITRITE UA  Negative   WBC UA /HPF 0-2   BACTERIA UA /HPF None Seen   SQUAM EPITHEL UA /HPF 3-6*     Imaging Results (Last 72 Hours)       Procedure Component Value Units Date/Time    CT Abdomen Pelvis Without Contrast [[id number]] Collected: 10/17/24 1156     Updated: 10/17/24 1213    Narrative:      CT ABDOMEN PELVIS WO CONTRAST    Date of Exam: 10/17/2024 11:43 AM EDT    Indication: abdominal pain n/v. History of cervical " cancer with hysterectomy and radiation therapy. Chronic right lower extremity lymphedema. History of right inguinal lymph node biopsy 10/8/2024 with benign findings.    Comparison: CT abdomen and pelvis 10/6/2024.    Technique: Axial CT images were obtained of the abdomen and pelvis without the administration of contrast. Sagittal and coronal reconstructions were performed.  Automated exposure control and iterative reconstruction methods were used.      Findings:  Hysterectomy changes are again noted. Ill-defined soft tissue thickening is demonstrated in the right hemipelvis retroperitoneum extending along the pelvic sidewall and contiguous to the iliac muscle. Soft tissue thickening is a favored to reflect   changes of radiation induced fibrosis, and appears very slightly diminished in thickness since 10/6/2024    There is distal ileal/right lower quadrant small bowel loop thickening with caliber narrowing (series 3 image 124). Some of the thickened or inflamed small bowel loops described in the prior examination no longer appear abnormally thickened. The upstream   small bowel loops are abnormally dilated with air-fluid levels, consistent with the appearance of small bowel obstruction. The small bowel dilation appears increased since the 10/6/2024 examination. There is abnormal fluid distention of the stomach    The right kidney is severely atrophic with hydronephrosis, thought to be related to distal ureteral obstruction by aforementioned pelvic mass or fibrosis the severe right hydronephrosis appears diminished since the prior study. Left kidney appears within   normal limits.    Noncontrast appearance of the liver, gallbladder, spleen, pancreas, adrenals is within normal limits.    Urinary bladder is decompressed. Rectum is normal.    Mildly enlarged right inguinal lymph node appears stable, 2.6 x 1.2 cm. No new or progressive adenopathy is seen.    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 No new osseous abnormalities.    Lung bases are clear. Benign calcified nodule in the right middle lobe. Normal heart size. Mild coronary artery calcifications. There is fluid distention of the lower thoracic esophagus      Impression:      1. Findings consistent with high-grade small bowel obstruction related to distal ileal narrowing/stricture in the right lower quadrant of the abdomen. The small bowel dilation appears increased since 10/6/2024. 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  2. Right hydronephrosis appears diminished since 10/6/2024. The right kidney is severely atrophic. 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  3. 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 Stable appearance of mildly enlarged right inguinal lymph node. This represents the node which underwent previous biopsy with benign findings.  5. Fluid distention of the thoracic esophagus and significant fluid distention of the stomach. Consider NG tube decompression.    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  Patient had been scheduled for outpatient colonoscopy due to positive Cologuard test, but was  unable to complete prep and began having feculent nausea/vomiting and abdominal distention.  Instructed to present to the ER.    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  No plans for colonoscopy as inpatient at this time due to recurrent small bowel obstruction.  General surgery has been consulted.  Will defer management to surgery at this time.  Antiemetics/analgesics as needed.  We will plan to reschedule outpatient colonoscopy when appropriate.  GI will be available as needed.      I discussed the patients findings and my recommendations with the patient.  I will discuss the case with Dr. Aggarwal and change plan accordingly.    We appreciate the referral.    Electronically signed by HANNAH Gonsalves, 10/17/24, 1:32 PM EDT.

## 2024-10-17 NOTE — CONSULTS
GENERAL SURGERY CONSULT      Patient Care Team:  Cinthia Brown MD as PCP - General (Internal Medicine & Pediatrics)  Patrick Leblanc APRN (Obstetrics and Gynecology)    Chief complaint vomiting  Subjective     This is a 68-year-old lady that presents with vomiting and abdominal distention.  Last week she was in the hospital for small bowel obstruction that resolved with nonoperative treatment.  Yesterday she started a bowel prep in preparation for colonoscopy due to a positive Cologuard test.  She began to vomit at home and came to the emergency room.  CT scan demonstrates high-grade small bowel obstruction likely at the terminal ileum with transition point at that location.  Patient has a past surgical history significant for hysterectomy and has also received radiation related to cervical cancer.  This is back in .    Review of Systems   All systems were reviewed and negative except for:  Gastrointestinal: positive for  nausea, pain and See HPI    History  Past Medical History:   Diagnosis Date    Allergic     amoxicilin clayvix    Anemia     Arthritis 2024    In right  foot    Cancer     cervial cancer/ radiation and had hysterectomy    Pain of right hip joint 2018    Right lower lobe pneumonia 2018    Small bowel obstruction 10/2024    Thrush, oral 2018     Past Surgical History:   Procedure Laterality Date     SECTION  1984    HAND SURGERY      HYSTERECTOMY      US GUIDED LYMPH NODE BIOPSY  10/8/2024     Family History   Problem Relation Age of Onset    COPD Mother         from smoking for years    Miscarriages / Stillbirths Mother         had a miscarriage    Thyroid disease Father         is taking thyroid pil;    Cancer Maternal Grandmother         had lung cancer     Social History     Tobacco Use    Smoking status: Never    Smokeless tobacco: Never   Vaping Use    Vaping status: Never Used   Substance Use Topics    Alcohol use: Not Currently      Comment: may drink a little wine but only very occasionally    Drug use: No     (Not in a hospital admission)    Allergies:  Augmentin [amoxicillin-pot clavulanate]    Objective     Vital Signs  Temp:  [98.2 °F (36.8 °C)] 98.2 °F (36.8 °C)  Heart Rate:  [] 79  Resp:  [16] 16  BP: (142-171)/(88-98) 171/98    Physical Exam:      General Appearance:    Alert, cooperative, in no acute distress   Head:    Normocephalic, without obvious abnormality, atraumatic,    Eyes:            Lids and lashes normal, conjunctivae and sclerae normal, no   icterus, no pallor, corneas clear, PERRLA   Ears:    Ears appear intact with no abnormalities noted   Throat:   No oral lesions, no thrush, oral mucosa moist   Neck:   No adenopathy, supple, trachea midline, no thyromegaly, no   carotid bruit, no JVD   Back:     No kyphosis present, no scoliosis present, no skin lesions,      erythema or scars, no tenderness to percussion or                   palpation,   range of motion normal   Lungs:     Clear to auscultation,respirations regular, even and                  unlabored    Heart:    Regular rhythm and normal rate, normal S1 and S2, no            murmur, no gallop, no rub, no click   Chest Wall:    No abnormalities observed   Abdomen:   Distended but soft   Rectal:     Deferred   Extremities: No edema, good ROM   Pulses:   Pulses palpable and equal bilaterally   Skin:   No bleeding, bruising or rash   Lymph nodes:   No palpable adenopathy   Neurologic:   Cranial nerves 2 - 12 grossly intact, sensation intact, DTR       present and equal bilaterally     Lab Results (last 24 hours)       Procedure Component Value Units Date/Time    Comprehensive Metabolic Panel [[id number]]  (Abnormal) Collected: 10/17/24 1049    Specimen: Blood Updated: 10/17/24 1232     Glucose 138 mg/dL      BUN 18 mg/dL      Creatinine 1.61 mg/dL      Sodium 136 mmol/L      Potassium 3.3 mmol/L      Chloride 92 mmol/L      CO2 27.6 mmol/L      Calcium 9.9 mg/dL       Total Protein 8.7 g/dL      Albumin 4.6 g/dL      ALT (SGPT) 18 U/L      AST (SGOT) 20 U/L      Alkaline Phosphatase 92 U/L      Total Bilirubin 0.5 mg/dL      Globulin 4.1 gm/dL      A/G Ratio 1.1 g/dL      BUN/Creatinine Ratio 11.2     Anion Gap 16.4 mmol/L      eGFR 34.9 mL/min/1.73     Narrative:      GFR Normal >60  Chronic Kidney Disease <60  Kidney Failure <15      Lipase [[id number]]  (Normal) Collected: 10/17/24 1049    Specimen: Blood Updated: 10/17/24 1232     Lipase 58 U/L     CBC & Differential [[id number]]  (Abnormal) Collected: 10/17/24 1049    Specimen: Blood Updated: 10/17/24 1057    Narrative:      The following orders were created for panel order CBC & Differential.  Procedure                               Abnormality         Status                     ---------                               -----------         ------                     CBC Auto Differential[[id number]]        Abnormal            Final result                 Please view results for these tests on the individual orders.    CBC Auto Differential [[id number]]  (Abnormal) Collected: 10/17/24 1049    Specimen: Blood Updated: 10/17/24 1057     WBC 12.71 10*3/mm3      RBC 4.41 10*6/mm3      Hemoglobin 11.7 g/dL      Hematocrit 36.3 %      MCV 82.3 fL      MCH 26.5 pg      MCHC 32.2 g/dL      RDW 16.3 %      RDW-SD 49.1 fl      MPV 8.7 fL      Platelets 558 10*3/mm3      Neutrophil % 90.5 %      Lymphocyte % 4.8 %      Monocyte % 3.9 %      Eosinophil % 0.1 %      Basophil % 0.2 %      Immature Grans % 0.5 %      Neutrophils, Absolute 11.50 10*3/mm3      Lymphocytes, Absolute 0.61 10*3/mm3      Monocytes, Absolute 0.50 10*3/mm3      Eosinophils, Absolute 0.01 10*3/mm3      Basophils, Absolute 0.03 10*3/mm3      Immature Grans, Absolute 0.06 10*3/mm3      nRBC 0.0 /100 WBC     Urinalysis, Microscopic Only - Urine, Clean Catch [[id number]]  (Abnormal) Collected: 10/17/24 1044    Specimen: Urine, Clean Catch Updated: 10/17/24 1055     RBC,  UA 0-2 /HPF      WBC, UA 0-2 /HPF      Bacteria, UA None Seen /HPF      Squamous Epithelial Cells, UA 3-6 /HPF      Hyaline Casts, UA None Seen /LPF      Mucus, UA Small/1+ /HPF      Methodology Automated Microscopy    Urinalysis With Microscopic If Indicated (No Culture) - Urine, Clean Catch [[id number]]  (Abnormal) Collected: 10/17/24 1044    Specimen: Urine, Clean Catch Updated: 10/17/24 1052     Color, UA Yellow     Appearance, UA Slightly Cloudy     Comment: Result checked          pH, UA 6.0     Specific Gravity, UA 1.020     Glucose, UA Negative     Ketones, UA Trace     Bilirubin, UA Moderate (2+)     Comment: Confirmation testing is unavailable.  A serum bilirubin is recommended for further assessment.        Blood, UA Trace     Protein,  mg/dL (2+)     Leuk Esterase, UA Trace     Nitrite, UA Negative     Urobilinogen, UA 1.0 E.U./dL            Imaging Results (Last 24 Hours)       Procedure Component Value Units Date/Time    CT Abdomen Pelvis Without Contrast [[id number]] Collected: 10/17/24 1156     Updated: 10/17/24 1213    Narrative:      CT ABDOMEN PELVIS WO CONTRAST    Date of Exam: 10/17/2024 11:43 AM EDT    Indication: abdominal pain n/v. History of cervical cancer with hysterectomy and radiation therapy. Chronic right lower extremity lymphedema. History of right inguinal lymph node biopsy 10/8/2024 with benign findings.    Comparison: CT abdomen and pelvis 10/6/2024.    Technique: Axial CT images were obtained of the abdomen and pelvis without the administration of contrast. Sagittal and coronal reconstructions were performed.  Automated exposure control and iterative reconstruction methods were used.      Findings:  Hysterectomy changes are again noted. Ill-defined soft tissue thickening is demonstrated in the right hemipelvis retroperitoneum extending along the pelvic sidewall and contiguous to the iliac muscle. Soft tissue thickening is a favored to reflect   changes of radiation induced  fibrosis, and appears very slightly diminished in thickness since 10/6/2024    There is distal ileal/right lower quadrant small bowel loop thickening with caliber narrowing (series 3 image 124). Some of the thickened or inflamed small bowel loops described in the prior examination no longer appear abnormally thickened. The upstream   small bowel loops are abnormally dilated with air-fluid levels, consistent with the appearance of small bowel obstruction. The small bowel dilation appears increased since the 10/6/2024 examination. There is abnormal fluid distention of the stomach    The right kidney is severely atrophic with hydronephrosis, thought to be related to distal ureteral obstruction by aforementioned pelvic mass or fibrosis the severe right hydronephrosis appears diminished since the prior study. Left kidney appears within   normal limits.    Noncontrast appearance of the liver, gallbladder, spleen, pancreas, adrenals is within normal limits.    Urinary bladder is decompressed. Rectum is normal.    Mildly enlarged right inguinal lymph node appears stable, 2.6 x 1.2 cm. No new or progressive adenopathy is seen.    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 No new osseous abnormalities.    Lung bases are clear. Benign calcified nodule in the right middle lobe. Normal heart size. Mild coronary artery calcifications. There is fluid distention of the lower thoracic esophagus      Impression:      1. Findings consistent with high-grade small bowel obstruction related to distal ileal narrowing/stricture in the right lower quadrant of the abdomen. The small bowel dilation appears increased since 10/6/2024. 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  2. Right hydronephrosis appears diminished since 10/6/2024. The right kidney is severely  atrophic. 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  3. 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 Stable appearance of mildly enlarged right inguinal lymph node. This represents the node which underwent previous biopsy with benign findings.  5. Fluid distention of the thoracic esophagus and significant fluid distention of the stomach. Consider NG tube decompression.    Electronically Signed: Michelle Hawkins MD    10/17/2024 12:11 PM EDT    Workstation ID: [id number]            Results Review:    I reviewed the patient's new clinical results.  I reviewed the patient's new imaging results and agree with the interpretation.    Assessment & Plan       Small bowel obstruction    68-year-old lady presents with small bowel obstruction at the terminal ileum.  She has a past surgical history significant for hysterectomy is also received radiation in the past.  She was here last week with small bowel obstruction and had a normal small bowel follow-through and was discharged after nonoperative management.  Yesterday she started a bowel prep for colonoscopy related to a positive Cologuard test.    I discussed with her the options of proceeding to the operating room for exploratory laparotomy.  She indicated to me that she has actually had 2 bowel movements today.  She believes that she just was not ready for a bowel prep yet.  She does have mild leukocytosis.  I told her we will observe overnight and I will see her again tomorrow morning and try to make a decision on the next course of action.  I will also order lactic acid.      Nicolette Lucero MD  10/17/24  15:20 EDT

## 2024-10-17 NOTE — ED PROVIDER NOTES
Subjective   History of Present Illness  Patient is 68-year-old female presents to the ED with reports of nausea, vomiting, heartburn, and abdominal bloating that started yesterday.  Patient states she started a bowel prep for colonoscopy yesterday including Gatorade and MiraLAX.  Patient does have a history of a recent bowel obstruction earlier this month in which was treated with conservative measures.  Patient states she called her PCP and GI they recommended come to the ED for further evaluation and rule out bowel obstruction.  Patient states she has had some bowel movement since starting the bowel prep.  She initially was getting the colonoscopy because she had a positive Cologuard.  No reports of fever or urinary complaints.  No chest pain or shortness of breath.    History provided by:  Patient      Review of Systems   Constitutional: Negative.    Respiratory: Negative.     Cardiovascular: Negative.    Gastrointestinal:  Positive for abdominal distention, abdominal pain, nausea and vomiting. Negative for blood in stool.   Genitourinary:  Negative for difficulty urinating, dysuria, flank pain, frequency and hematuria.   Musculoskeletal:  Negative for back pain and neck pain.   Skin: Negative.        Past Medical History:   Diagnosis Date    Allergic     amoxicilin clayvix    Anemia     Arthritis 2024    In right  foot    Cancer 1993    cervial cancer/ radiation and had hysterectomy    Pain of right hip joint 2018    Right lower lobe pneumonia 2018    Small bowel obstruction 10/2024    Thrush, oral 2018       Allergies   Allergen Reactions    Augmentin [Amoxicillin-Pot Clavulanate] Hives       Past Surgical History:   Procedure Laterality Date     SECTION  1984    HAND SURGERY      HYSTERECTOMY      US GUIDED LYMPH NODE BIOPSY  10/8/2024       Family History   Problem Relation Age of Onset    COPD Mother         from smoking for years    Miscarriages / Stillbirths Mother          had a miscarriage    Thyroid disease Father         is taking thyroid pil;    Cancer Maternal Grandmother         had lung cancer       Social History     Socioeconomic History    Marital status:    Tobacco Use    Smoking status: Never    Smokeless tobacco: Never   Vaping Use    Vaping status: Never Used   Substance and Sexual Activity    Alcohol use: Not Currently     Comment: may drink a little wine but only very occasionally    Drug use: No    Sexual activity: Yes     Partners: Male     Birth control/protection: None     Comment: have had hysterectomy           Objective   Physical Exam  Vitals and nursing note reviewed.   Constitutional:       General: She is not in acute distress.     Appearance: Normal appearance. She is well-developed. She is not ill-appearing, toxic-appearing or diaphoretic.   HENT:      Head: Normocephalic and atraumatic.      Mouth/Throat:      Mouth: Mucous membranes are moist.      Pharynx: Oropharynx is clear.   Eyes:      General: No scleral icterus.     Extraocular Movements: Extraocular movements intact.      Pupils: Pupils are equal, round, and reactive to light.   Cardiovascular:      Rate and Rhythm: Normal rate and regular rhythm.      Heart sounds: No murmur heard.     No friction rub. No gallop.   Pulmonary:      Effort: Pulmonary effort is normal. No respiratory distress.      Breath sounds: Normal breath sounds. No stridor. No wheezing, rhonchi or rales.   Chest:      Chest wall: No tenderness.   Abdominal:      General: Bowel sounds are decreased. There is no distension. There are no signs of injury.      Palpations: Abdomen is soft.      Tenderness: There is no abdominal tenderness. There is no right CVA tenderness, left CVA tenderness, guarding or rebound.      Hernia: No hernia is present.   Skin:     General: Skin is warm.      Capillary Refill: Capillary refill takes less than 2 seconds.      Coloration: Skin is not cyanotic, jaundiced or pale.       "Findings: No rash.   Neurological:      General: No focal deficit present.      Mental Status: She is alert and oriented to person, place, and time.   Psychiatric:         Mood and Affect: Mood normal.         Behavior: Behavior normal.         Procedures           ED Course  ED Course as of 10/17/24 1321   u Oct 17, 2024   1112 Hemoglobin(!): 11.7  Improvement on chart review [AA]   1112 WBC(!): 12.71 [AA]   1224 CT Abdomen Pelvis Without Contrast  Surgery will be consulted will discuss possible NG tube placement.  [AA]   1321 Spoke to Dr. Lucero who was in agreement with plan of NG tube and consultation [AA]      ED Course User Index  [AA] Yolette Wells PA    /98   Pulse 79   Temp 98.2 °F (36.8 °C)   Resp 16   Ht 165 cm (64.96\")   Wt 86.3 kg (190 lb 4.1 oz)   SpO2 100%   BMI 31.70 kg/m²   Medications   sodium chloride 0.9 % flush 10 mL (has no administration in time range)   metoclopramide (REGLAN) injection 10 mg (10 mg Intravenous Given 10/17/24 1051)   sodium chloride 0.9 % bolus 500 mL (500 mL Intravenous New Bag 10/17/24 1246)     Labs Reviewed   COMPREHENSIVE METABOLIC PANEL - Abnormal; Notable for the following components:       Result Value    Glucose 138 (*)     Creatinine 1.61 (*)     Potassium 3.3 (*)     Chloride 92 (*)     Total Protein 8.7 (*)     Anion Gap 16.4 (*)     eGFR 34.9 (*)     All other components within normal limits    Narrative:     GFR Normal >60  Chronic Kidney Disease <60  Kidney Failure <15     URINALYSIS W/ MICROSCOPIC IF INDICATED (NO CULTURE) - Abnormal; Notable for the following components:    Appearance, UA Slightly Cloudy (*)     Ketones, UA Trace (*)     Bilirubin, UA Moderate (2+) (*)     Blood, UA Trace (*)     Protein,  mg/dL (2+) (*)     Leuk Esterase, UA Trace (*)     All other components within normal limits   CBC WITH AUTO DIFFERENTIAL - Abnormal; Notable for the following components:    WBC 12.71 (*)     Hemoglobin 11.7 (*)     MCH 26.5 (*)  "    RDW 16.3 (*)     Platelets 558 (*)     Neutrophil % 90.5 (*)     Lymphocyte % 4.8 (*)     Monocyte % 3.9 (*)     Eosinophil % 0.1 (*)     Neutrophils, Absolute 11.50 (*)     Lymphocytes, Absolute 0.61 (*)     Immature Grans, Absolute 0.06 (*)     All other components within normal limits   URINALYSIS, MICROSCOPIC ONLY - Abnormal; Notable for the following components:    Squamous Epithelial Cells, UA 3-6 (*)     Mucus, UA Small/1+ (*)     All other components within normal limits   LIPASE - Normal   CBC AND DIFFERENTIAL    Narrative:     The following orders were created for panel order CBC & Differential.  Procedure                               Abnormality         Status                     ---------                               -----------         ------                     CBC Auto Differential[[id number]]        Abnormal            Final result                 Please view results for these tests on the individual orders.       CT Abdomen Pelvis Without Contrast   Final Result   1. Findings consistent with high-grade small bowel obstruction related to distal ileal narrowing/stricture in the right lower quadrant of the abdomen. The small bowel dilation appears increased since 10/6/2024. The distal ileal narrowing is thought to    represent a stricture secondary to radiation therapy.   2. Right hydronephrosis appears diminished since 10/6/2024. The right kidney is severely atrophic. 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   3. 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 Stable appearance of mildly enlarged right inguinal lymph node. This represents the node which underwent previous biopsy with benign findings.   5. Fluid distention of the thoracic esophagus and significant fluid distention of the stomach. Consider NG tube decompression.      Electronically Signed: Michelle  MD Shruthi     10/17/2024 12:11 PM EDT     Workstation ID: [id number]                                                 Medical Decision Making  Chart Review: Discharge summary reviewed from 10/9/2024 was admitted for small bowel obstruction pelvic mass and hydronephrosis General Surgery, heme-onc, GI, and urology consulted during hospitalization.  Small bowel obstruction was treated conservatively    Differentials: Bowel obstruction, gastroenteritis, dehydration, electrolyte abnormality, UTI     ;this list is not all inclusive and does not constitute the entirety of considered causes  Labs: as above   Radiology:   CT Abdomen Pelvis Without Contrast   Final Result    1. Findings consistent with high-grade small bowel obstruction related to distal ileal narrowing/stricture in the right lower quadrant of the abdomen. The small bowel dilation appears increased since 10/6/2024. The distal ileal narrowing is thought to     represent a stricture secondary to radiation therapy.    2. Right hydronephrosis appears diminished since 10/6/2024. The right kidney is severely atrophic. 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    3. 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 Stable appearance of mildly enlarged right inguinal lymph node. This represents the node which underwent previous biopsy with benign findings.    5. Fluid distention of the thoracic esophagus and significant fluid distention of the stomach. Consider NG tube decompression.        Electronically Signed: Michelle Hawkins MD      10/17/2024 12:11 PM EDT      Workstation ID: [id number]    Disposition/Treatment:  Appropriate PPE was worn during exam and throughout all encounters with the patient.  When the ED IV was placed and labs were obtained patient was placed on proper monitors was given Reglan for nausea with some improvement  presented with reports of nausea vomiting and abdominal distention.  Had a recent bowel obstruction earlier in the month and was concern for another bowel obstruction was sent in by PCP and GI.  Patient did do bowel prep starting yesterday for colonoscopy due to a positive Cologuard.     CBC shows a white count of 12.71 hemoglobin 11.7 which actually has improved on chart review from earlier in the month.  Metabolic panel shows glucose 138 not in DKA.  Did have acute kidney injury with a creatinine of 1.618 days ago creatinine was 0.91.  She was given 500 cc normal saline.  Potassium 3.3 chloride 92 total protein 8.7.  CT abdomen pelvis was ordered to look for acute infection or obstruction was consistent with a high-grade small bowel obstruction related to distal ileal narrowing/stricture.  She was also noted to have right hydronephrosis that has improved from imaging on 10/6/2024 additional findings noted as above.  Did recommend possible NG tube decompression Case was discussed with on-call surgeon  who was in agreement plan of NG tube and consultation.     Findings were discussed with the patient and family at bedside who are in agreement plan of admission for further management of small bowel obstruction.  Patient will be admitted with hospitalist group spoke to RAYA Singh who agreed for admission     Problems Addressed:  Acute kidney injury: complicated acute illness or injury  Nausea and vomiting, unspecified vomiting type: complicated acute illness or injury  Small bowel obstruction: complicated acute illness or injury    Amount and/or Complexity of Data Reviewed  Labs: ordered. Decision-making details documented in ED Course.  Radiology: ordered. Decision-making details documented in ED Course.    Risk  Prescription drug management.  Decision regarding hospitalization.        Final diagnoses:   Small bowel obstruction   Nausea and vomiting, unspecified vomiting type   Acute kidney injury       ED  Disposition  ED Disposition       ED Disposition   Decision to Admit    Condition   --    Comment   Level of Care: Med/Surg [1]   Diagnosis: Small bowel obstruction [332748]   Admitting Physician: DANTE MATHUR [[id number]]   Certification: I Certify That Inpatient Hospital Services Are Medically Necessary For Greater Than 2 Midnights                 No follow-up provider specified.       Medication List      No changes were made to your prescriptions during this visit.            Yolette Wells PA  10/17/24 2705

## 2024-10-18 ENCOUNTER — INPATIENT HOSPITAL (OUTPATIENT)
Age: 69
End: 2024-10-18
Payer: MEDICARE

## 2024-10-18 ENCOUNTER — APPOINTMENT (OUTPATIENT)
Dept: GENERAL RADIOLOGY | Facility: HOSPITAL | Age: 69
DRG: 389 | End: 2024-10-18
Payer: MEDICARE

## 2024-10-18 ENCOUNTER — ANESTHESIA (OUTPATIENT)
Dept: GASTROENTEROLOGY | Facility: HOSPITAL | Age: 69
End: 2024-10-18
Payer: MEDICARE

## 2024-10-18 ENCOUNTER — INPATIENT HOSPITAL (OUTPATIENT)
Dept: URBAN - METROPOLITAN AREA HOSPITAL 84 | Facility: HOSPITAL | Age: 69
End: 2024-10-18
Payer: MEDICARE

## 2024-10-18 ENCOUNTER — ANESTHESIA EVENT (OUTPATIENT)
Dept: GASTROENTEROLOGY | Facility: HOSPITAL | Age: 69
End: 2024-10-18
Payer: MEDICARE

## 2024-10-18 DIAGNOSIS — K63.89 OTHER SPECIFIED DISEASES OF INTESTINE: ICD-10-CM

## 2024-10-18 DIAGNOSIS — K64.8 OTHER HEMORRHOIDS: ICD-10-CM

## 2024-10-18 DIAGNOSIS — Z12.11 ENCOUNTER FOR SCREENING FOR MALIGNANT NEOPLASM OF COLON: ICD-10-CM

## 2024-10-18 DIAGNOSIS — R19.5 OTHER FECAL ABNORMALITIES: ICD-10-CM

## 2024-10-18 DIAGNOSIS — K57.30 DIVERTICULOSIS OF LARGE INTESTINE WITHOUT PERFORATION OR ABS: ICD-10-CM

## 2024-10-18 DIAGNOSIS — D12.2 BENIGN NEOPLASM OF ASCENDING COLON: ICD-10-CM

## 2024-10-18 LAB
ANION GAP SERPL CALCULATED.3IONS-SCNC: 10.4 MMOL/L (ref 5–15)
BASOPHILS # BLD AUTO: 0.02 10*3/MM3 (ref 0–0.2)
BASOPHILS NFR BLD AUTO: 0.3 % (ref 0–1.5)
BUN SERPL-MCNC: 21 MG/DL (ref 8–23)
BUN/CREAT SERPL: 12.2 (ref 7–25)
CALCIUM SPEC-SCNC: 8.5 MG/DL (ref 8.6–10.5)
CHLORIDE SERPL-SCNC: 99 MMOL/L (ref 98–107)
CO2 SERPL-SCNC: 26.6 MMOL/L (ref 22–29)
CREAT SERPL-MCNC: 1.72 MG/DL (ref 0.57–1)
DEPRECATED RDW RBC AUTO: 51.7 FL (ref 37–54)
EGFRCR SERPLBLD CKD-EPI 2021: 32.3 ML/MIN/1.73
EOSINOPHIL # BLD AUTO: 0.14 10*3/MM3 (ref 0–0.4)
EOSINOPHIL NFR BLD AUTO: 1.8 % (ref 0.3–6.2)
ERYTHROCYTE [DISTWIDTH] IN BLOOD BY AUTOMATED COUNT: 16.8 % (ref 12.3–15.4)
GLUCOSE SERPL-MCNC: 131 MG/DL (ref 65–99)
HCT VFR BLD AUTO: 29.9 % (ref 34–46.6)
HGB BLD-MCNC: 9.2 G/DL (ref 12–15.9)
IMM GRANULOCYTES # BLD AUTO: 0.03 10*3/MM3 (ref 0–0.05)
IMM GRANULOCYTES NFR BLD AUTO: 0.4 % (ref 0–0.5)
LYMPHOCYTES # BLD AUTO: 1.08 10*3/MM3 (ref 0.7–3.1)
LYMPHOCYTES NFR BLD AUTO: 13.5 % (ref 19.6–45.3)
MCH RBC QN AUTO: 26.1 PG (ref 26.6–33)
MCHC RBC AUTO-ENTMCNC: 30.8 G/DL (ref 31.5–35.7)
MCV RBC AUTO: 84.7 FL (ref 79–97)
MONOCYTES # BLD AUTO: 0.84 10*3/MM3 (ref 0.1–0.9)
MONOCYTES NFR BLD AUTO: 10.5 % (ref 5–12)
NEUTROPHILS NFR BLD AUTO: 5.88 10*3/MM3 (ref 1.7–7)
NEUTROPHILS NFR BLD AUTO: 73.5 % (ref 42.7–76)
NRBC BLD AUTO-RTO: 0 /100 WBC (ref 0–0.2)
PLATELET # BLD AUTO: 409 10*3/MM3 (ref 140–450)
PMV BLD AUTO: 8.8 FL (ref 6–12)
POTASSIUM SERPL-SCNC: 3.3 MMOL/L (ref 3.5–5.2)
RBC # BLD AUTO: 3.53 10*6/MM3 (ref 3.77–5.28)
SODIUM SERPL-SCNC: 136 MMOL/L (ref 136–145)
WBC NRBC COR # BLD AUTO: 7.99 10*3/MM3 (ref 3.4–10.8)

## 2024-10-18 PROCEDURE — 25010000002 ONDANSETRON PER 1 MG: Performed by: NURSE ANESTHETIST, CERTIFIED REGISTERED

## 2024-10-18 PROCEDURE — 45385 COLONOSCOPY W/LESION REMOVAL: CPT | Mod: PT | Performed by: INTERNAL MEDICINE

## 2024-10-18 PROCEDURE — 25010000002 DEXAMETHASONE PER 1 MG: Performed by: NURSE ANESTHETIST, CERTIFIED REGISTERED

## 2024-10-18 PROCEDURE — 88305 TISSUE EXAM BY PATHOLOGIST: CPT | Performed by: INTERNAL MEDICINE

## 2024-10-18 PROCEDURE — 74018 RADEX ABDOMEN 1 VIEW: CPT

## 2024-10-18 PROCEDURE — 25010000002 GLYCOPYRROLATE 1 MG/5ML SOLUTION PREFILLED SYRINGE: Performed by: NURSE ANESTHETIST, CERTIFIED REGISTERED

## 2024-10-18 PROCEDURE — 0DBK8ZZ EXCISION OF ASCENDING COLON, VIA NATURAL OR ARTIFICIAL OPENING ENDOSCOPIC: ICD-10-PCS | Performed by: INTERNAL MEDICINE

## 2024-10-18 PROCEDURE — 25010000002 SUCCINYLCHOLINE PER 20 MG: Performed by: NURSE ANESTHETIST, CERTIFIED REGISTERED

## 2024-10-18 PROCEDURE — 25010000002 FENTANYL CITRATE (PF) 100 MCG/2ML SOLUTION: Performed by: NURSE ANESTHETIST, CERTIFIED REGISTERED

## 2024-10-18 PROCEDURE — 25010000002 PROPOFOL 200 MG/20ML EMULSION: Performed by: NURSE ANESTHETIST, CERTIFIED REGISTERED

## 2024-10-18 PROCEDURE — 25810000003 SODIUM CHLORIDE 0.9 % SOLUTION: Performed by: NURSE ANESTHETIST, CERTIFIED REGISTERED

## 2024-10-18 PROCEDURE — 25010000002 LIDOCAINE PF 2% 2 % SOLUTION: Performed by: NURSE ANESTHETIST, CERTIFIED REGISTERED

## 2024-10-18 PROCEDURE — 80048 BASIC METABOLIC PNL TOTAL CA: CPT

## 2024-10-18 PROCEDURE — 25010000002 SUGAMMADEX 200 MG/2ML SOLUTION: Performed by: NURSE ANESTHETIST, CERTIFIED REGISTERED

## 2024-10-18 PROCEDURE — 85025 COMPLETE CBC W/AUTO DIFF WBC: CPT

## 2024-10-18 RX ORDER — GLYCOPYRROLATE 1 MG/5 ML
SYRINGE (ML) INTRAVENOUS AS NEEDED
Status: DISCONTINUED | OUTPATIENT
Start: 2024-10-18 | End: 2024-10-18 | Stop reason: SURG

## 2024-10-18 RX ORDER — ONDANSETRON 2 MG/ML
INJECTION INTRAMUSCULAR; INTRAVENOUS AS NEEDED
Status: DISCONTINUED | OUTPATIENT
Start: 2024-10-18 | End: 2024-10-18 | Stop reason: SURG

## 2024-10-18 RX ORDER — PHENYLEPHRINE HCL IN 0.9% NACL 1 MG/10 ML
SYRINGE (ML) INTRAVENOUS AS NEEDED
Status: DISCONTINUED | OUTPATIENT
Start: 2024-10-18 | End: 2024-10-18 | Stop reason: SURG

## 2024-10-18 RX ORDER — DEXAMETHASONE SODIUM PHOSPHATE 4 MG/ML
INJECTION, SOLUTION INTRA-ARTICULAR; INTRALESIONAL; INTRAMUSCULAR; INTRAVENOUS; SOFT TISSUE AS NEEDED
Status: DISCONTINUED | OUTPATIENT
Start: 2024-10-18 | End: 2024-10-18 | Stop reason: SURG

## 2024-10-18 RX ORDER — SUCCINYLCHOLINE CHLORIDE 20 MG/ML
INJECTION INTRAMUSCULAR; INTRAVENOUS AS NEEDED
Status: DISCONTINUED | OUTPATIENT
Start: 2024-10-18 | End: 2024-10-18 | Stop reason: SURG

## 2024-10-18 RX ORDER — DEXMEDETOMIDINE HYDROCHLORIDE 100 UG/ML
INJECTION, SOLUTION INTRAVENOUS AS NEEDED
Status: DISCONTINUED | OUTPATIENT
Start: 2024-10-18 | End: 2024-10-18 | Stop reason: SURG

## 2024-10-18 RX ORDER — PROPOFOL 10 MG/ML
INJECTION, EMULSION INTRAVENOUS AS NEEDED
Status: DISCONTINUED | OUTPATIENT
Start: 2024-10-18 | End: 2024-10-18 | Stop reason: SURG

## 2024-10-18 RX ORDER — LIDOCAINE HYDROCHLORIDE 20 MG/ML
INJECTION, SOLUTION EPIDURAL; INFILTRATION; INTRACAUDAL; PERINEURAL AS NEEDED
Status: DISCONTINUED | OUTPATIENT
Start: 2024-10-18 | End: 2024-10-18 | Stop reason: SURG

## 2024-10-18 RX ORDER — FENTANYL CITRATE 50 UG/ML
INJECTION, SOLUTION INTRAMUSCULAR; INTRAVENOUS AS NEEDED
Status: DISCONTINUED | OUTPATIENT
Start: 2024-10-18 | End: 2024-10-18 | Stop reason: SURG

## 2024-10-18 RX ORDER — ROCURONIUM BROMIDE 10 MG/ML
INJECTION, SOLUTION INTRAVENOUS AS NEEDED
Status: DISCONTINUED | OUTPATIENT
Start: 2024-10-18 | End: 2024-10-18 | Stop reason: SURG

## 2024-10-18 RX ORDER — SODIUM CHLORIDE 9 MG/ML
INJECTION, SOLUTION INTRAVENOUS CONTINUOUS PRN
Status: DISCONTINUED | OUTPATIENT
Start: 2024-10-18 | End: 2024-10-18 | Stop reason: SURG

## 2024-10-18 RX ADMIN — SUGAMMADEX 100 MG: 100 INJECTION, SOLUTION INTRAVENOUS at 15:18

## 2024-10-18 RX ADMIN — DEXMEDETOMIDINE HYDROCHLORIDE 4 MCG: 100 INJECTION, SOLUTION INTRAVENOUS at 14:47

## 2024-10-18 RX ADMIN — DEXAMETHASONE SODIUM PHOSPHATE 8 MG: 4 INJECTION, SOLUTION INTRAMUSCULAR; INTRAVENOUS at 14:56

## 2024-10-18 RX ADMIN — ROCURONIUM BROMIDE 30 MG: 10 INJECTION, SOLUTION INTRAVENOUS at 14:45

## 2024-10-18 RX ADMIN — SUCCINYLCHOLINE CHLORIDE 120 MG: 20 INJECTION, SOLUTION INTRAMUSCULAR; INTRAVENOUS at 14:39

## 2024-10-18 RX ADMIN — FENTANYL CITRATE 100 MCG: 50 INJECTION, SOLUTION INTRAMUSCULAR; INTRAVENOUS at 14:36

## 2024-10-18 RX ADMIN — PROPOFOL 170 MG: 10 INJECTION, EMULSION INTRAVENOUS at 14:39

## 2024-10-18 RX ADMIN — Medication 100 MCG: at 15:16

## 2024-10-18 RX ADMIN — Medication 0.2 MG: at 14:48

## 2024-10-18 RX ADMIN — SODIUM CHLORIDE: 9 INJECTION, SOLUTION INTRAVENOUS at 14:31

## 2024-10-18 RX ADMIN — ONDANSETRON 4 MG: 2 INJECTION INTRAMUSCULAR; INTRAVENOUS at 14:57

## 2024-10-18 RX ADMIN — LIDOCAINE HYDROCHLORIDE 100 MG: 20 INJECTION, SOLUTION EPIDURAL; INFILTRATION; INTRACAUDAL; PERINEURAL at 14:39

## 2024-10-18 RX ADMIN — DEXTROSE MONOHYDRATE, SODIUM CHLORIDE, AND POTASSIUM CHLORIDE 125 ML/HR: 50; 9; 1.49 INJECTION, SOLUTION INTRAVENOUS at 04:59

## 2024-10-18 RX ADMIN — DEXMEDETOMIDINE HYDROCHLORIDE 2 MCG: 100 INJECTION, SOLUTION INTRAVENOUS at 14:58

## 2024-10-18 NOTE — ANESTHESIA POSTPROCEDURE EVALUATION
Patient: Marilyn Pineda    Procedure Summary       Date: 10/18/24 Room / Location: [facility] ENDOSCOPY 2 / [facility] ENDOSCOPY    Anesthesia Start: 1432 Anesthesia Stop: 1535    Procedure: COLONOSCOPY WITH POLYPECTOMY X2 Diagnosis:       Positive colorectal cancer screening using Cologuard test      (Positive colorectal cancer screening using Cologuard test [R19.5])    Surgeons: NADEEM Aggarwal MD Provider: Krzysztof Edwards CRNA    Anesthesia Type: general ASA Status: 2            Anesthesia Type: general    Vitals  Vitals Value Taken Time   /93 10/18/24 1618   Temp     Pulse 65 10/18/24 1618   Resp 17 10/18/24 1550   SpO2 100 % 10/18/24 1550   Vitals shown include unfiled device data.        Post Anesthesia Care and Evaluation    Patient location during evaluation: PACU  Patient participation: complete - patient participated  Level of consciousness: awake  Pain management: adequate    Airway patency: patent  Anesthetic complications: No anesthetic complications  PONV Status: none  Cardiovascular status: acceptable  Respiratory status: acceptable  Hydration status: acceptable    Comments: Patient seen and examined postoperatively; vital signs stable; SpO2 greater than or equal to 90%; cardiopulmonary status stable; nausea/vomiting adequately controlled; pain adequately controlled; no apparent anesthesia complications; patient discharged from anesthesia care when discharge criteria were met

## 2024-10-18 NOTE — ANESTHESIA PROCEDURE NOTES
Airway  Urgency: elective    Date/Time: 10/18/2024 2:39 PM  Airway not difficult    General Information and Staff    Anesthesiologist: Rajani Gray MD  CRNA/CAA: Rajani Gray MD    Indications and Patient Condition  Indications for airway management: airway protection    Preoxygenated: yes  Mask difficulty assessment: 0 - not attempted    Final Airway Details  Final airway type: endotracheal airway      Successful airway: ETT  Cuffed: yes   Successful intubation technique: video laryngoscopy  Facilitating devices/methods: cricoid pressure  Endotracheal tube insertion site: oral  Blade: Lucas  Blade size: 3  ETT size (mm): 7.0  Cormack-Lehane Classification: grade I - full view of glottis  Placement verified by: chest auscultation and capnometry   Cuff volume (mL): 8  Measured from: gums  ETT/EBT to gums (cm): 21  Number of attempts at approach: 1  Assessment: lips, teeth, and gum same as pre-op and atraumatic intubation

## 2024-10-18 NOTE — PLAN OF CARE
Goal Outcome Evaluation:  Plan of Care Reviewed With: patient        Progress: no change  Outcome Evaluation: Patient is being treated for a small bowel obstruction. Has a NG tube to low-int. suction. Has had approximately 500cc of output. No complaints of nausea or pain. Has had a few small, loose stools over night. General surgery to see patient this morning to discuss if surgery is needed. Rested well over night.

## 2024-10-18 NOTE — CONSULTS
visited patient in regards to spiritual care consult.   provided supportive presence, supportive conversation, and prayer for upcoming colonoscopy and results.  Patient's daughter at bedside.  Will continue to follow.

## 2024-10-18 NOTE — PROGRESS NOTES
[facility] MEDICINE SERVICE  DAILY PROGRESS NOTE    NAME: Marilyn Pineda  : 1955  MRN: [id number]      LOS: 1 day     PROVIDER OF SERVICE: Kirstie Montelongo MD    Chief Complaint: Small bowel obstruction    Subjective:     Interval History:  History taken from: patient    Patient is otherwise doing well this morning.  She continues to have bowel movements.  Her daughter is present at bedside.  Patient is inquiring if she could possibly have a colonoscopy later today versus tomorrow.  She denies abdominal pain at the present time.        Review of Systems:   Review of Systems   All other systems reviewed and are negative.      Objective:     Vital Signs  Temp:  [97.7 °F (36.5 °C)-98.2 °F (36.8 °C)] 97.7 °F (36.5 °C)  Heart Rate:  [] 78  Resp:  [13-22] 18  BP: (100-178)/() 143/90   Body mass index is 32.66 kg/m².    Physical Exam  Physical Exam  Constitutional:       General: She is awake.      Appearance: Normal appearance. She is well-developed and well-groomed.   HENT:      Head: Normocephalic and atraumatic.      Nose: Nose normal.      Mouth/Throat:      Mouth: Mucous membranes are moist.      Pharynx: Oropharynx is clear.   Eyes:      Extraocular Movements: Extraocular movements intact.      Conjunctiva/sclera: Conjunctivae normal.      Pupils: Pupils are equal, round, and reactive to light.   Cardiovascular:      Rate and Rhythm: Normal rate and regular rhythm.      Pulses: Normal pulses.      Heart sounds: Normal heart sounds.   Pulmonary:      Effort: Pulmonary effort is normal.      Breath sounds: Normal breath sounds.   Abdominal:      General: Abdomen is flat. Bowel sounds are normal.      Palpations: Abdomen is soft.   Musculoskeletal:         General: Normal range of motion.      Cervical back: Normal range of motion and neck supple.      Right lower leg: No edema.      Left lower leg: No edema.   Skin:     General: Skin is warm and dry.   Neurological:      General: No focal  deficit present.      Mental Status: She is alert and oriented to person, place, and time. Mental status is at baseline.      Cranial Nerves: Cranial nerves 2-12 are intact.      Sensory: Sensation is intact.      Motor: Motor function is intact.   Psychiatric:         Mood and Affect: Mood normal.         Behavior: Behavior normal. Behavior is cooperative.         Thought Content: Thought content normal.         Judgment: Judgment normal.            Diagnostic Data    Results from last 7 days   Lab Units 10/18/24  0405 10/17/24  1049   WBC 10*3/mm3 7.99 12.71*   HEMOGLOBIN g/dL 9.2* 11.7*   HEMATOCRIT % 29.9* 36.3   PLATELETS 10*3/mm3 409 558*   GLUCOSE mg/dL 131* 138*   CREATININE mg/dL 1.72* 1.61*   BUN mg/dL 21 18   SODIUM mmol/L 136 136   POTASSIUM mmol/L 3.3* 3.3*   AST (SGOT) U/L  --  20   ALT (SGPT) U/L  --  18   ALK PHOS U/L  --  92   BILIRUBIN mg/dL  --  0.5   ANION GAP mmol/L 10.4 16.4*       XR Abdomen KUB    Result Date: 10/17/2024  Impression: 1.Nasogastric tube tip is within the stomach. 2.Abnormal bowel gas pattern which could relate to small bowel obstruction. Electronically Signed: Faraz Collier MD  10/17/2024 3:41 PM EDT  Workstation ID: [id number]    CT Abdomen Pelvis Without Contrast    Result Date: 10/17/2024  1. Findings consistent with high-grade small bowel obstruction related to distal ileal narrowing/stricture in the right lower quadrant of the abdomen. The small bowel dilation appears increased since 10/6/2024. 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 2. Right hydronephrosis appears diminished since 10/6/2024. The right kidney is severely atrophic. 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 3. 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 Stable appearance of mildly enlarged right inguinal lymph node. This  represents the node which underwent previous biopsy with benign findings. 5. Fluid distention of the thoracic esophagus and significant fluid distention of the stomach. Consider NG tube decompression. Electronically Signed: Michelle Hawkins MD  10/17/2024 12:11 PM EDT  Workstation ID: [id number]       I reviewed the patient's new clinical results.    Assessment/Plan:     Active and Resolved Problems  Active Hospital Problems    Diagnosis  POA    **Small bowel obstruction [K56.609]  Yes      Resolved Hospital Problems   No resolved problems to display.     High-grade small bowel obstruction noted on CT imaging  Abnormal outpatient Cologuard test  History of cervical cancer status post radiation early 1990s  Anemia presumed of chronic disease  Nausea vomiting, now resolved    Patient continues to have several bowel movements.  NG remains on wall suction.  She denies abdominal pain.  No documented fevers.  Plan of care has been reviewed with her as well as her daughter present at bedside.  Will reach out to general surgery in regards to colonoscopy and/or further intervention.  Will plan disposition based on surgery recommendations.  Continue pain control as well as as needed Zofran.      VTE Prophylaxis:  Mechanical VTE prophylaxis orders are present.             Disposition Planning:     Barriers to Discharge: None noted  Anticipated Date of Discharge: 10/19/2024  Place of Discharge: Home      Time: 45 minutes     Code Status and Medical Interventions: CPR (Attempt to Resuscitate); Full Support   Ordered at: 10/17/24 1322     Code Status (Patient has no pulse and is not breathing):    CPR (Attempt to Resuscitate)     Medical Interventions (Patient has pulse or is breathing):    Full Support       Signature: Electronically signed by Kirstie Montelongo MD, 10/18/24, 09:18 EDT.  [facility] Hospitalist Team

## 2024-10-18 NOTE — CASE MANAGEMENT/SOCIAL WORK
Continued Stay Note   James     Patient Name: Marilyn Pineda  MRN: [id number]  Today's Date: 10/18/2024    Admit Date: 10/17/2024    Plan: Return home   Discharge Plan       Row Name 10/18/24 6018       Plan    Plan Return home    Plan Comments Patient having colonoscopy today, anxious about results and positive Cologuard test and hx of CA. NG tube removed this AM. If not having N/V after colonoscopy can start advancing diet slowly with clear liquids. Anticipate return to home when medically ready. Family to transport             Yeny Turner RN     [facility]  Phone # [phone]  Fax # [phone]

## 2024-10-18 NOTE — PLAN OF CARE
Goal Outcome Evaluation:  Plan of Care Reviewed With: patient        Progress: improving  Outcome Evaluation: Patient has been resting well throughout the shift without any complaints. NG tube removed at 1120 this morning per order. Remains on D5 1/2 NS with 20mEq of potassium running at 125mL/hr. Down for colonscopy at this time. No other issues at this time.

## 2024-10-18 NOTE — SIGNIFICANT NOTE
10/18/24 1148   Readmission Indications   Is the patient and/or family able to complete the readmission assessment questions? Yes   Is this hospitalization related to the prior hospital diagnosis? Yes   Recommendation for rehospitalization   Did you speak with your physician prior to coming to the hospital No   Follow-up Appointments   Do you have a PCP? Yes  (Dr. Brown)   Did you have an appointment with PCP after your hospitalization? Yes   When was your appointment scheduled? 10/11/24   Did you go to appointment? Yes   Did you have an appointment with a Specialist? Yes   When was your appointment scheduled? 11/21/24   Did you go to appointment? No   Are you current with the Pulmonary Clinic? No   Are you current with the CHF Clinic? No   Medications   Did you have newly prescribed medications at discharge? No  (zofran order changed)   Did you understand the reasons for your medications at discharge and how to take them? Yes   Did you understand the side effects of your medications? Yes   Are you taking all of you prescribed medications? Yes   What pharmacy was used to fill prescription(s)? [facility]   Were medications picked up? Yes   Discharge Instructions   Did you understand your discharge instructions? Yes   Did your family/caregiver hear your instructions? Yes  (sister Audelia)   Were you told to eat a special diet? No   Did you adhere to the diet? Yes   Were you given a number of someone to call if you had questions or concerns? Yes   Index discharge location/services   Where did you go upon discharge? Home   Do you have supportive family or friends in the home? Yes   Discharge Readiness   On a scale of 1-5 (5 being well prepared), how ready were you for discharge 5   Recommendation based on interview Other (comment)  (patient felt ready for d/c; this readmission due to issues during bowel prep for scheduled colonoscopy)   Palliative Care/Hospice   Are you current with Palliative Care? No   Are  you current with Hospice Care? No   Advance Directives (For Healthcare)   Pre-existing AND/MOST/POLST Order No   Advance Directive Status Patient has advance directive, copy in chart   Type of Advance Directive Health care directive/Living will   Have you reviewed your Advance Directive and is it valid for this stay? Yes   Literature Provided on Advance Directives No   Patient Requests Assistance on Advance Directives Patient Declined   Readmission Assessment Final Comments   Final Comments Previous: Admitted for N/V r/t SBO, severe hydronephrosis. Urology, Hem/Onc, and GI consulted. R hydronephrosis seems to be chronic. Abnormal soft tissue density in right hemipelvis- malignancy vs infectious/inflammatory. IR biopsy 10/8 (-) malignancy. Small bowel follow through 10/7 did not indicate sig. SBO. OP Colonoscopy d/t Cologuard+ test scheduled 10/17. Current: Pt started bowel prep for colonoscopy, c/o N/V and heartburn. CT A/P shows high-grade SBO. Sx and GI consults. NG tube placed, pt had multiple BM's. Plans for colonoscopy today. LACE 7

## 2024-10-18 NOTE — ANESTHESIA PREPROCEDURE EVALUATION
Anesthesia Evaluation     NPO Solid Status: > 8 hours  NPO Liquid Status: > 8 hours           Airway   Mallampati: I  TM distance: >3 FB  Neck ROM: full  No difficulty expected  Dental - normal exam     Pulmonary - normal exam   Cardiovascular - normal exam        Neuro/Psych  (+) numbness, psychiatric history  GI/Hepatic/Renal/Endo      ROS Comment: H/o bowel obstruction    Musculoskeletal     Abdominal  - normal exam    Bowel sounds: normal.   Substance History      OB/GYN          Other   arthritis,   history of cancer                Anesthesia Plan    ASA 2     general   Rapid sequence  intravenous induction     Anesthetic plan, risks, benefits, and alternatives have been provided, discussed and informed consent has been obtained with: patient.  Pre-procedure education provided  Plan discussed with CRNA.    CODE STATUS:    Code Status (Patient has no pulse and is not breathing): CPR (Attempt to Resuscitate)  Medical Interventions (Patient has pulse or is breathing): Full Support

## 2024-10-18 NOTE — PROGRESS NOTES
Bariatric Surgery Progress Note    Name: Marilyn Pineda ADMIT: 10/17/2024   : 1955  PCP: Cinthia Brown MD    MRN: [id number] LOS: 1 days   AGE/SEX: 68 y.o. female  ROOM: [facility]/   Subjective   68 year old female that presented to the ER with vomiting and abdominal distention. Last week she was in the hospital for small bowel obstruction that resolved with nonoperative treatment. Yesterday she started a bowel prep in preparation for colonoscopy due to a positive Cologuard test. She began to vomit at home and came to the emergency room. CT scan demonstrates high-grade small bowel obstruction likely at the terminal ileum with transition point at that location. Patient has a past surgical history significant for hysterectomy and has also received radiation related to cervical cancer.     Today pt is doing better. States she is not having any abdominal pain and in only complaining of abdominal bloating. Has reported several small bowel movements- did finish her bowel prep for her colonoscopy that is scheduled for 2:30 today. Denies nausea or vomiting. Had her NG tube removed earlier today and pt states she has done well since this. Tolerated some ice chips before colonoscopy schedule but has been NPO since midnight.     Objective      Vital Signs  Vital Signs (range)  Temp:  [97.7 °F (36.5 °C)-98.2 °F (36.8 °C)] 97.7 °F (36.5 °C)  Heart Rate:  [72-96] 78  Resp:  [13-22] 18  BP: (100-178)/() 143/90    Physical Exam:    Awake and alert  Normal mental status  Normal pulmonary effort  Abdomen appropriate minimal, bowel sounds active in q 4 quadrants   Extremities no tenderness or swelling        CBC    Results from last 7 days   Lab Units 10/18/24  0405 10/17/24  1049   WBC 10*3/mm3 7.99 12.71*   HEMOGLOBIN g/dL 9.2* 11.7*   PLATELETS 10*3/mm3 409 558*   No leukocytosis noted today  Lactic acid 1.4     CMP   Results from last 7 days   Lab Units 10/18/24  0405 10/17/24  1049   SODIUM mmol/L 136 136    POTASSIUM mmol/L 3.3* 3.3*   CHLORIDE mmol/L 99 92*   CO2 mmol/L 26.6 27.6   BUN mg/dL 21 18   CREATININE mg/dL 1.72* 1.61*   GLUCOSE mg/dL 131* 138*   ALBUMIN g/dL  --  4.6   BILIRUBIN mg/dL  --  0.5   ALK PHOS U/L  --  92   AST (SGOT) U/L  --  20   ALT (SGPT) U/L  --  18   LIPASE U/L  --  58       Assessment & Plan     Small bowel obstruction    Positive colorectal cancer screening using Cologuard test      68 year old lady that presents with small bowel obstruction at the terminal ileum. She has a past surgical hx of hysterectomy and also at some point, received radiation related to cervical cancer. She was in the hospital last week with a small bowel obstruction but this was managed nonoperatively. She did start a bowel prep for colonoscopy yesterday after a positive cologuard. Pt is scheduled today at 2:30 for a colonoscopy. Pt has had several small bowel movements this morning- possible related to bowel prep. Pt states bowel movements are liquid at this point. She did have her NG tube removed this morning. Denies nausea or vomiting. No abdominal pain reported, only bloating. Labs reviewed and leukocytosis has improved to 7.99. lactic reviewed and normal. Plan to follow up after colonoscopy. If not having any nausea / vomiting after colonoscopy, can start slowly advancing diet starting with clear liquids.     Aylin Tucker, APRN  10/18/24  12:14 EDT

## 2024-10-18 NOTE — ANESTHESIA POSTPROCEDURE EVALUATION
Patient: Marilyn Pineda    Procedure Summary       Date: 10/18/24 Room / Location: [facility] ENDOSCOPY 2 / [facility] ENDOSCOPY    Anesthesia Start: 1432 Anesthesia Stop:     Procedure: COLONOSCOPY WITH POLYPECTOMY X2 Diagnosis:       Positive colorectal cancer screening using Cologuard test      (Positive colorectal cancer screening using Cologuard test [R19.5])    Surgeons: NADEEM Aggarwal MD Provider: Krzysztof Edwards CRNA    Anesthesia Type: general ASA Status: 2            Anesthesia Type: general    Vitals  Vitals Value Taken Time   BP     Temp     Pulse 90 10/18/24 1531   Resp     SpO2 100 % 10/18/24 1531   Vitals shown include unfiled device data.        Post Anesthesia Care and Evaluation    Patient location during evaluation: PACU  Patient participation: complete - patient participated  Level of consciousness: awake and alert  Pain management: satisfactory to patient    Airway patency: patent  Anesthetic complications: No anesthetic complications  PONV Status: none  Cardiovascular status: acceptable  Respiratory status: acceptable  Hydration status: acceptable

## 2024-10-18 NOTE — OP NOTE
COLONOSCOPY Procedure Report    Patient Name:  Marilyn Pineda  YOB: 1955    Date of Surgery:  10/18/2024     Pre-Op Diagnosis:  Positive colorectal cancer screening using Cologuard test [R19.5]       Post-Op Diagnosis Codes:     * Positive colorectal cancer screening using Cologuard test [R19.5]    Post Op Diagnosis:   Diverticulosis  Colon polyps      Procedure/CPT® Codes:      Procedure(s):  COLONOSCOPY WITH POLYPECTOMY X2    Staff:  Surgeon(s):  NADEEM Aggarwal MD      Anesthesia: General anesthesia    Code Status:  Discussed the code status with patient, and they will remain full code    Description of Procedure:  A description of the procedure as well as risks, benefits and alternative methods were explained to the patient who voiced understanding and signed the corresponding consent form. A physical exam was performed and vital signs were monitored throughout the procedure.    A rectal exam was performed which was normal. An Olympus colonoscope was placed into the rectum and proceeded under direct visualization through the colon until the cecum and appendiceal orifice were identified. Careful visualization occurred upon slow withdraw of the scope. The scope was then retroflexed and the distal rectum was visualized. The quality of the prep was good. The procedure was not difficult and there were no immediate complications.  There was no blood loss.    Colonoscopy Findings:    1.  Severe diverticulosis in the descending and sigmoid colon with numerous medium and large size openings, and very tortuous, difficult to navigate requiring abdominal pressure and position changes.  2.  Erythema in the terminal ileum.  Did not appreciate any stricture or narrowing at the ileocecal valve.  Was able to traverse easily with the pediatric colonoscope.  Advance the scope at least 15 to 20 cm beyond the ileocecal valve.  Only patchy erythema was seen.  No ulceration or stricture was seen.  No abnormal  appearing mucosa in the cecum.  3.  2 colon polyps in the ascending colon between 5 and 8 mm in diameter which appeared benign, sessile and were both removed in a single piece with cold snare polypectomy completely removed  4.  Medium size nonbleeding internal hemorrhoids    Recommendations:   -Partial small bowel obstruction seems improved clinically.  An OG tube was placed during the procedure with no fluid suctioned out.    -Okay to resume full liquid diet and advance as tolerated  -If recurrent bowel obstruction in the future this is likely due to stricture more proximal in the ileum, unable to reach with the colonoscope, may need to consider surgical management  -Continue laxatives daily to prevent constipation  -Recall for colonoscopy in 7 years if adenomatous, otherwise 10 years    GI will see inpatient as needed.      REMIGIO Aggarwal MD     Date: 10/18/2024    Time: 15:22 EDT

## 2024-10-19 ENCOUNTER — READMISSION MANAGEMENT (OUTPATIENT)
Dept: CALL CENTER | Facility: HOSPITAL | Age: 69
End: 2024-10-19
Payer: MEDICARE

## 2024-10-19 VITALS
WEIGHT: 190.26 LBS | BODY MASS INDEX: 32.48 KG/M2 | DIASTOLIC BLOOD PRESSURE: 77 MMHG | HEIGHT: 64 IN | HEART RATE: 79 BPM | OXYGEN SATURATION: 96 % | TEMPERATURE: 97.8 F | SYSTOLIC BLOOD PRESSURE: 131 MMHG | RESPIRATION RATE: 16 BRPM

## 2024-10-19 LAB
ANION GAP SERPL CALCULATED.3IONS-SCNC: 7.2 MMOL/L (ref 5–15)
BASOPHILS # BLD AUTO: 0.01 10*3/MM3 (ref 0–0.2)
BASOPHILS NFR BLD AUTO: 0.1 % (ref 0–1.5)
BUN SERPL-MCNC: 19 MG/DL (ref 8–23)
BUN/CREAT SERPL: 18.3 (ref 7–25)
CALCIUM SPEC-SCNC: 8.4 MG/DL (ref 8.6–10.5)
CHLORIDE SERPL-SCNC: 101 MMOL/L (ref 98–107)
CO2 SERPL-SCNC: 26.8 MMOL/L (ref 22–29)
CREAT SERPL-MCNC: 1.04 MG/DL (ref 0.57–1)
DEPRECATED RDW RBC AUTO: 51.8 FL (ref 37–54)
EGFRCR SERPLBLD CKD-EPI 2021: 59 ML/MIN/1.73
EOSINOPHIL # BLD AUTO: 0 10*3/MM3 (ref 0–0.4)
EOSINOPHIL NFR BLD AUTO: 0 % (ref 0.3–6.2)
ERYTHROCYTE [DISTWIDTH] IN BLOOD BY AUTOMATED COUNT: 16.6 % (ref 12.3–15.4)
GLUCOSE SERPL-MCNC: 149 MG/DL (ref 65–99)
HCT VFR BLD AUTO: 29.6 % (ref 34–46.6)
HGB BLD-MCNC: 9.3 G/DL (ref 12–15.9)
IMM GRANULOCYTES # BLD AUTO: 0.04 10*3/MM3 (ref 0–0.05)
IMM GRANULOCYTES NFR BLD AUTO: 0.4 % (ref 0–0.5)
LYMPHOCYTES # BLD AUTO: 0.49 10*3/MM3 (ref 0.7–3.1)
LYMPHOCYTES NFR BLD AUTO: 4.9 % (ref 19.6–45.3)
MCH RBC QN AUTO: 26.7 PG (ref 26.6–33)
MCHC RBC AUTO-ENTMCNC: 31.4 G/DL (ref 31.5–35.7)
MCV RBC AUTO: 85.1 FL (ref 79–97)
MONOCYTES # BLD AUTO: 0.19 10*3/MM3 (ref 0.1–0.9)
MONOCYTES NFR BLD AUTO: 1.9 % (ref 5–12)
NEUTROPHILS NFR BLD AUTO: 9.17 10*3/MM3 (ref 1.7–7)
NEUTROPHILS NFR BLD AUTO: 92.7 % (ref 42.7–76)
NRBC BLD AUTO-RTO: 0 /100 WBC (ref 0–0.2)
PLATELET # BLD AUTO: 380 10*3/MM3 (ref 140–450)
PMV BLD AUTO: 9.1 FL (ref 6–12)
POTASSIUM SERPL-SCNC: 3.6 MMOL/L (ref 3.5–5.2)
RBC # BLD AUTO: 3.48 10*6/MM3 (ref 3.77–5.28)
SODIUM SERPL-SCNC: 135 MMOL/L (ref 136–145)
WBC NRBC COR # BLD AUTO: 9.9 10*3/MM3 (ref 3.4–10.8)

## 2024-10-19 PROCEDURE — 85025 COMPLETE CBC W/AUTO DIFF WBC: CPT

## 2024-10-19 PROCEDURE — 80048 BASIC METABOLIC PNL TOTAL CA: CPT

## 2024-10-19 NOTE — DISCHARGE SUMMARY
"             [facility] Medicine Services  Discharge Summary    Date of Service: 10/19/2024  Patient Name: Marilyn Pineda  : 1955  MRN: [id number]    Date of Admission: 10/17/2024  Discharge Diagnosis: Small bowel obstruction  Date of Discharge: 10/19/2024  Primary Care Physician: Cinthia Brown MD      Presenting Problem:   Small bowel obstruction [K56.609]  Acute kidney injury [N17.9]  Nausea and vomiting, unspecified vomiting type [R11.2]    Active and Resolved Hospital Problems:  Active Hospital Problems    Diagnosis POA    **Small bowel obstruction [K56.609] Yes    Positive colorectal cancer screening using Cologuard test [R19.5] Unknown      Resolved Hospital Problems   No resolved problems to display.         Hospital Course     HPI:    \"Marilyn Pineda is a 68 y.o. female with a PMH of anemia, small bowel obstruction, arthritis, anemia and cervical cancer s/p hysterectomy who presented to [facility] on 10/17/2024 with abdominal pain and bloating. She is also having nausea, vomiting, and heartburn. She had bowel prep yesterday for a colonoscopy, and has a recent history of a small bowel obstruction for which she was seen and treated here.      On ED evaluation, potassium 3.3,  WBC 12.71, Ct abdomen shows \" Findings consistent with high-grade small bowel obstruction related to distal ileal narrowing/stricture in the right lower quadrant of the abdomen. The small bowel dilation appears increased since 10/6/2024. 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 Right hydronephrosis appears diminished since 10/6/2024. The right kidney is severely atrophic. 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 Stable appearance of mildly enlarged right inguinal lymph node. " "This represents the node which underwent previous biopsy with benign findings. Fluid distention of the thoracic esophagus and significant fluid distention of the stomach. Consider NG tube decompression. \" General surgery was called, who recommended NG tube. Hospitalist team to admit for further medical management.\"    Hospital Course:  Patient was admitted after findings of a small bowel obstruction in the lower right quadrant were seen on CT scan.  Patient was started on an NG tube and kept strict NPO.  General surgery was consulted to evaluate patient, Dr. Lucero saw patient.   Originally thought of doing an exploratory laparotomy however patient began to have bowel movements the day that he saw her.  Plan for exploratory laparotomy was delayed, Dr. Lucero observed overnight.  Patient continued to have bowel movements and was able to tolerate bowel prep.  GI was consulted and saw patient due to positive Cologuard test.  Patient was doing colonoscopy prep when she experienced the nausea vomiting that brought her to the ED.  Repeat KUB was obtained and GI was notified of resolving SBO.  GI performed colonoscopy yesterday, 10-, under general anesthesia.  Per GI, \"-Partial small bowel obstruction seems improved clinically.  An OG tube was placed during the procedure with no fluid suctioned out.    -Okay to resume full liquid diet and advance as tolerated  -If recurrent bowel obstruction in the future this is likely due to stricture more proximal in the ileum, unable to reach with the colonoscope, may need to consider surgical management  -Continue laxatives daily to prevent constipation  -Recall for colonoscopy in 7 years if adenomatous, otherwise 10 years\".  Status post colonoscopy, patient states she has been passing flatus and reports she is tolerating full liquid diet as well as breakfast this morning.  Primary RN has been instructed to not discharge patient unless patient feels as though she is able to " fully tolerate diet this morning.  Patient has been educated on low residue diet due to diverticulosis.        DISCHARGE Follow Up Recommendations for labs and diagnostics: PCP in 2 to 3 days        Day of Discharge     Vital Signs:  Temp:  [97.6 °F (36.4 °C)-98.5 °F (36.9 °C)] 97.9 °F (36.6 °C)  Heart Rate:  [62-91] 69  Resp:  [11-22] 16  BP: (100-149)/(58-91) 148/88  Flow (L/min) (Oxygen Therapy):  [8] 8  FiO2 (%):  [50 %] 50 %    Physical Exam:  Physical Exam   General: yo, Alert and oriented, obese, no acute distress.  HENT: Normocephalic, normal hearing, moist oral mucosa, no scleral icterus.  Neck: Supple, non-tender, no carotid bruits, no JVD, no LAD.  Lungs: Clear to auscultation, non-labored respiration.  Heart: RRR, no murmur, gallop or edema.  Abdomen: Soft, nontender, non-distended, + bowel sounds.  Musculoskeletal: Normal range of motion and strength, no tenderness or swelling.  Skin: Skin is warm, dry and pink, no rashes or lesions.  Psychiatric: Cooperative, appropriate mood and affect.        Pertinent  and/or Most Recent Results     LAB RESULTS:      Lab 10/19/24  0009 10/18/24  0405 10/17/24  1849 10/17/24  1049   WBC 9.90 7.99  --  12.71*   HEMOGLOBIN 9.3* 9.2*  --  11.7*   HEMATOCRIT 29.6* 29.9*  --  36.3   PLATELETS 380 409  --  558*   NEUTROS ABS 9.17* 5.88  --  11.50*   IMMATURE GRANS (ABS) 0.04 0.03  --  0.06*   LYMPHS ABS 0.49* 1.08  --  0.61*   MONOS ABS 0.19 0.84  --  0.50   EOS ABS 0.00 0.14  --  0.01   MCV 85.1 84.7  --  82.3   LACTATE  --   --  1.4  --          Lab 10/19/24  0009 10/18/24  0405 10/17/24  1049   SODIUM 135* 136 136   POTASSIUM 3.6 3.3* 3.3*   CHLORIDE 101 99 92*   CO2 26.8 26.6 27.6   ANION GAP 7.2 10.4 16.4*   BUN 19 21 18   CREATININE 1.04* 1.72* 1.61*   EGFR 59.0* 32.3* 34.9*   GLUCOSE 149* 131* 138*   CALCIUM 8.4* 8.5* 9.9         Lab 10/17/24  1049   TOTAL PROTEIN 8.7*   ALBUMIN 4.6   GLOBULIN 4.1   ALT (SGPT) 18   AST (SGOT) 20   BILIRUBIN 0.5   ALK PHOS 92    LIPASE 58                     Brief Urine Lab Results  (Last result in the past 365 days)        Color   Clarity   Blood   Leuk Est   Nitrite   Protein   CREAT   Urine HCG        10/17/24 1044 Yellow   Slightly Cloudy  Comment: Result checked     Trace   Trace   Negative   100 mg/dL (2+)                 Microbiology Results (last 10 days)       ** No results found for the last 240 hours. **            XR Abdomen KUB    Result Date: 10/18/2024  Impression: Impression: Mild gaseous distention of small bowel, decreased compared to 10/17/2024 KUB. Electronically Signed: Berta Chua  10/18/2024 10:05 AM EDT  Workstation ID: [id number]    XR Abdomen KUB    Result Date: 10/17/2024  Impression: Impression: 1.Nasogastric tube tip is within the stomach. 2.Abnormal bowel gas pattern which could relate to small bowel obstruction. Electronically Signed: Faraz Collier MD  10/17/2024 3:41 PM EDT  Workstation ID: [id number]    CT Abdomen Pelvis Without Contrast    Result Date: 10/17/2024  Impression: 1. Findings consistent with high-grade small bowel obstruction related to distal ileal narrowing/stricture in the right lower quadrant of the abdomen. The small bowel dilation appears increased since 10/6/2024. 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 2. Right hydronephrosis appears diminished since 10/6/2024. The right kidney is severely atrophic. 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 3. 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 Stable appearance of mildly enlarged right inguinal lymph node. This represents the node which underwent previous biopsy with benign findings. 5. Fluid distention of the thoracic esophagus and significant fluid distention of the stomach. Consider NG tube decompression. Electronically Signed: Michelle Hawkins MD   10/17/2024 12:11 PM EDT  Workstation ID: [id number]    US Guided Lymph Node Biopsy    Result Date: 10/8/2024  Impression: Successful ultrasound-guided right inguinal lymph node core biopsy. Report dictated by: Justin Basilio DNP  I have personally reviewed this case and agree with the findings above: Electronically Signed: Elan Skinner MD  10/8/2024 1:21 PM EDT  Workstation ID: [id number]    FL Small Bowel Follow Through Single-Contrast    Result Date: 10/7/2024  Impression: Impression: No significant small bowel obstruction. There is water-soluble contrast in the colon and rectum on the 4-hour image. Electronically Signed: Lowell Walden MD  10/7/2024 4:56 PM EDT  Workstation ID: [id number]    XR Skull < 4 View    Result Date: 10/7/2024  Impression: Impression: Benign right posterior occipital osteoma/exostosis measuring 2.6 x 1.7 cm. Electronically Signed: Jose Luis Murillo MD  10/7/2024 8:13 AM EDT  Workstation ID: [id number]    CT Abdomen Pelvis With Contrast    Result Date: 10/6/2024  Impression: Chest- No evidence of metastatic disease. No acute process demonstrated Abdomen/pelvis- 1. There is ill-defined soft tissue in the right side of the pelvis. Finding could represent treatment related changes and fibrosis. Tumor not excluded, in comparison with any outside prior imaging would be helpful with this regard. The abnormality results in right ureteral obstruction which may be longstanding given the presence of cortical atrophy, and right iliac vein obstruction which is associated with collateral formation. 2. Distal small bowel obstruction with transition at the terminal ileum. Thickening of the terminal ileum may relate to radiation enteropathy or fibrosis. This may also account for thickening of the base of the cecum. Wall thickening of the urinary bladder suggests radiation cystitis 3. Abnormal appearance of the right SI joint and adjacent bones. 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 The presence of infection of  the SI joint is not excluded 4. Thickening of the right iliopsoas muscle may be treatment related or secondary to adjacent SI joint infection if present 5. 3 mm indeterminate liver lesion, likely cyst. Follow-up recommended 6. Mild mesenteric adenopathy, and enlarged right groin lymph nodes Electronically Signed: Abhishek Huang  10/6/2024 3:44 PM EDT  Workstation ID: [id number]    CT Chest With Contrast Diagnostic    Result Date: 10/6/2024  Impression: Chest- No evidence of metastatic disease. No acute process demonstrated Abdomen/pelvis- 1. There is ill-defined soft tissue in the right side of the pelvis. Finding could represent treatment related changes and fibrosis. Tumor not excluded, in comparison with any outside prior imaging would be helpful with this regard. The abnormality results in right ureteral obstruction which may be longstanding given the presence of cortical atrophy, and right iliac vein obstruction which is associated with collateral formation. 2. Distal small bowel obstruction with transition at the terminal ileum. Thickening of the terminal ileum may relate to radiation enteropathy or fibrosis. This may also account for thickening of the base of the cecum. Wall thickening of the urinary bladder suggests radiation cystitis 3. Abnormal appearance of the right SI joint and adjacent bones. 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 The presence of infection of the SI joint is not excluded 4. Thickening of the right iliopsoas muscle may be treatment related or secondary to adjacent SI joint infection if present 5. 3 mm indeterminate liver lesion, likely cyst. Follow-up recommended 6. Mild mesenteric adenopathy, and enlarged right groin lymph nodes Electronically Signed: Abhishek Huang  10/6/2024 3:44 PM EDT  Workstation ID: [id number]    CT Head With & Without Contrast    Result Date: 10/6/2024  Impression: Impression: No acute intracranial pathology. No abnormal intracranial enhancement.  Electronically Signed: Alejandro Baugh MD  10/6/2024 3:20 PM EDT  Workstation ID: [id number]    CT Abdomen Pelvis Without Contrast    Result Date: 10/6/2024  Impression: Impression: 1.Abnormal soft tissue density centered in the right hemipelvis, nonspecific in appearance but suspected to largely represent fibrosis and/or post XRT changes. Other possibilities such as underlying neoplasm or infection are considered less likely, however, not definitively excluded. 2.Partial versus early complete small bowel obstruction in this region. 3.Apparent wall thickening of the cecum favored to be secondary to post XRT change although superimposed active inflammation or neoplasm cannot be definitively excluded. 4.Obstruction of the distal right ureter by the above process with resulting severe right hydronephrosis and hydroureter. Severe right renal parenchymal atrophy. 5.Previous hysterectomy 6.Mesentery and right inguinal adenopathy in the lower abdomen and pelvis could be inflammatory or neoplastic in etiology. 7.Multiple vascular collaterals noted in the anterior pelvic wall suggesting some degree of obstruction of the right external and internal iliac artery and veins. Evaluation is limited by the lack of IV contrast. Electronically Signed: Young Blake MD  10/6/2024 11:03 AM EDT  Workstation ID: [id number]     Results for orders placed during the hospital encounter of 06/17/24    Duplex Venous Lower Extremity - Right CAR    Interpretation Summary    Normal right lower extremity venous duplex scan.      Results for orders placed during the hospital encounter of 06/17/24    Duplex Venous Lower Extremity - Right CAR    Interpretation Summary    Normal right lower extremity venous duplex scan.          Labs Pending at Discharge:  Pending Labs       Order Current Status    Tissue Pathology Exam In process            Procedures Performed  Procedure(s):  COLONOSCOPY WITH POLYPECTOMY X2  10/18 0553 Colonoscopy      Consults:   Consults        Date and Time Order Name Status Description    10/18/2024  9:32 AM Inpatient Gastroenterology Consult      10/17/2024 12:43 PM Hospitalist (on-call MD unless specified)      10/17/2024 12:24 PM Surgery (on-call MD unless specified) Completed     10/6/2024  2:07 PM Inpatient Gastroenterology Consult Completed     10/6/2024  2:04 PM Hematology & Oncology Inpatient Consult Completed     10/6/2024  1:30 PM Inpatient General Surgery Consult Completed     10/6/2024  1:30 PM Inpatient Urology Consult Completed     10/6/2024  1:08 PM Inpatient General Surgery Consult Completed               Discharge Details        Discharge Medications        Continue These Medications        Instructions Start Date   BEET ROOT PO   500 mg, Daily      BIOTIN PO   1 tablet, Daily      Calcium-Magnesium-Zinc-D3 tablet   1 tablet, Daily      cyanocobalamin 2500 MCG tablet tablet  Commonly known as: VITAMIN B-12   500 mcg, Daily      IRON PO   65 mg, Daily      multivitamin with minerals tablet tablet   1 tablet, Daily      ondansetron ODT 4 MG disintegrating tablet  Commonly known as: ZOFRAN-ODT   4 mg, Every 6 Hours PRN      vitamin B-6 50 MG tablet  Commonly known as: PYRIDOXINE   100 mg, Daily               Allergies   Allergen Reactions    Augmentin [Amoxicillin-Pot Clavulanate] Hives         Discharge Disposition:   Home or Self Care    Diet:  Hospital:  Diet Order   Procedures    Diet: Gastrointestinal; Fiber-Restricted; Fluid Consistency: Thin (IDDSI 0)         Discharge Activity:         CODE STATUS:  Code Status and Medical Interventions: CPR (Attempt to Resuscitate); Full Support   Ordered at: 10/17/24 1322     Code Status (Patient has no pulse and is not breathing):    CPR (Attempt to Resuscitate)     Medical Interventions (Patient has pulse or is breathing):    Full Support         Future Appointments   Date Time Provider Department Center   10/23/2024  2:00 PM Olivia Gutierrez, PT [facility]   10/28/2024  1:00 PM  Olivia Gutierrez, PT [facility]   11/6/2024  1:00 PM Olivia Gutierrez, PT [facility]   11/13/2024 10:00 AM Olivia Gutierrez, PT [facility]       Additional Instructions for the Follow-ups that You Need to Schedule       Discharge Follow-up with PCP   As directed       Currently Documented PCP:    Cinthia Brown MD    PCP Phone Number:    [phone]     Follow Up Details: 2-3 days                Time spent on Discharge including face to face service:  45 minutes    Signature: Electronically signed by HANNAH Thomas, 10/19/24, 11:36 EDT.  [facility] Hospitalist Team

## 2024-10-19 NOTE — PLAN OF CARE
Problem: Adult Inpatient Plan of Care  Goal: Plan of Care Review  Outcome: Progressing  Flowsheets (Taken 10/19/2024 0237)  Progress: improving  Plan of Care Reviewed With: patient  Goal: Patient-Specific Goal (Individualized)  Outcome: Progressing  Goal: Absence of Hospital-Acquired Illness or Injury  Outcome: Progressing  Intervention: Identify and Manage Fall Risk  Recent Flowsheet Documentation  Taken 10/19/2024 0039 by Olivia Pollack RN  Safety Promotion/Fall Prevention:   assistive device/personal items within reach   clutter free environment maintained   nonskid shoes/slippers when out of bed   safety round/check completed   room organization consistent  Taken 10/18/2024 2232 by Olivia Pollack RN  Safety Promotion/Fall Prevention:   assistive device/personal items within reach   clutter free environment maintained   nonskid shoes/slippers when out of bed   room organization consistent   safety round/check completed  Taken 10/18/2024 2050 by Olivia Pollack RN  Safety Promotion/Fall Prevention:   assistive device/personal items within reach   clutter free environment maintained   nonskid shoes/slippers when out of bed   safety round/check completed   room organization consistent  Intervention: Prevent Skin Injury  Recent Flowsheet Documentation  Taken 10/18/2024 2050 by Olivia Pollack RN  Body Position:   position changed independently   supine  Intervention: Prevent and Manage VTE (Venous Thromboembolism) Risk  Recent Flowsheet Documentation  Taken 10/18/2024 2050 by Olivia Pollack RN  VTE Prevention/Management:   patient refused intervention   SCDs (sequential compression devices) off  Intervention: Prevent Infection  Recent Flowsheet Documentation  Taken 10/19/2024 0039 by Olivia Pollack RN  Infection Prevention:   environmental surveillance performed   hand hygiene promoted   single patient room provided  Taken 10/18/2024 2232 by Olivia Pollack RN  Infection Prevention:   environmental surveillance  performed   hand hygiene promoted   single patient room provided  Taken 10/18/2024 2050 by Olivia Pollack RN  Infection Prevention:   environmental surveillance performed   hand hygiene promoted   single patient room provided  Goal: Optimal Comfort and Wellbeing  Outcome: Progressing  Intervention: Provide Person-Centered Care  Recent Flowsheet Documentation  Taken 10/18/2024 2050 by Olivia Pollack RN  Trust Relationship/Rapport:   care explained   choices provided  Goal: Readiness for Transition of Care  Outcome: Progressing  Intervention: Mutually Develop Transition Plan  Recent Flowsheet Documentation  Taken 10/18/2024 2358 by Olivia Pollack RN  Equipment Needed After Discharge: none  Equipment Currently Used at Home: none  Anticipated Changes Related to Illness: none  Transportation Anticipated: family or friend will provide  Transportation Concerns: none  Concerns to be Addressed: no discharge needs identified  Readmission Within the Last 30 Days: no previous admission in last 30 days  Patient/Family Anticipated Services at Transition: none  Patient/Family Anticipates Transition to: home with family     Problem: Intestinal Obstruction  Goal: Optimal Bowel Function  Outcome: Progressing  Intervention: Promote Bowel Function  Recent Flowsheet Documentation  Taken 10/18/2024 2050 by Olivia Pollack RN  Body Position:   position changed independently   supine  Head of Bed (HOB) Positioning: HOB at 60-90 degrees  Positioning/Transfer Devices:   pillows   in use  Goal: Fluid and Electrolyte Balance  Outcome: Progressing  Goal: Absence of Infection Signs and Symptoms  Outcome: Progressing  Goal: Optimize Nutrition Status  Outcome: Progressing  Goal: Optimal Pain Control and Function  Outcome: Progressing  Intervention: Prevent or Manage Pain  Recent Flowsheet Documentation  Taken 10/18/2024 2050 by Olivia Pollack RN  Diversional Activities: television  Sleep/Rest Enhancement: relaxation techniques promoted   Goal  Outcome Evaluation:  Plan of Care Reviewed With: patient        Progress: improving     Alert and oriented x 4. Able to verbalize needs and wants. Continent of bowel and bladder. Independent for transfers/ambulation. Does not require O2 therapy at this time. No c/o pain/discomfort/SOB noted. Per patient request, IV fluids stopped because pump would continue to beep when patient moved her arm. Potassium WNL. Tolerating full liquid diet with no GI discomfort/nausea/diarrhea noted. Continues to be followed by gastroenterology and surgery. Currently in bed, awake. Call bell in reach. Per case management, patient will return home when appropriate.

## 2024-10-19 NOTE — PROGRESS NOTES
Bariatric Surgery Progress Note    Name: Marilyn Pineda ADMIT: 10/17/2024   : 1955  PCP: Cinthia Brown MD    MRN: [id number] LOS: 2 days   AGE/SEX: 68 y.o. female  ROOM: [facility]   Subjective   Tolerating small amount of breakfast this morning.  She says she feels full but no real nausea.  No abdominal pain.    Objective      Vital Signs  Vital Signs (range)  Temp:  [97.6 °F (36.4 °C)-98.5 °F (36.9 °C)] 97.8 °F (36.6 °C)  Heart Rate:  [62-91] 79  Resp:  [15-22] 16  BP: (100-149)/(58-91) 131/77  FiO2 (%):  [50 %] 50 %    Physical Exam:    Awake and alert  Normal mental status  Normal pulmonary effort  Abdomen no tenderness  Incisions no erythema  Extremities no tenderness or swelling      CBC    Results from last 7 days   Lab Units 10/19/24  0009 10/18/24  0405 10/17/24  1049   WBC 10*3/mm3 9.90 7.99 12.71*   HEMOGLOBIN g/dL 9.3* 9.2* 11.7*   PLATELETS 10*3/mm3 380 409 558*     CMP   Results from last 7 days   Lab Units 10/19/24  0009 10/18/24  0405 10/17/24  1049   SODIUM mmol/L 135* 136 136   POTASSIUM mmol/L 3.6 3.3* 3.3*   CHLORIDE mmol/L 101 99 92*   CO2 mmol/L 26.8 26.6 27.6   BUN mg/dL 19 21 18   CREATININE mg/dL 1.04* 1.72* 1.61*   GLUCOSE mg/dL 149* 131* 138*   ALBUMIN g/dL  --   --  4.6   BILIRUBIN mg/dL  --   --  0.5   ALK PHOS U/L  --   --  92   AST (SGOT) U/L  --   --  20   ALT (SGPT) U/L  --   --  18   LIPASE U/L  --   --  58       Assessment & Plan     Small bowel obstruction    Positive colorectal cancer screening using Cologuard test    68-year-old lady admitted with small bowel obstruction.  She had just undergone a bowel prep for colonoscopy.  She initially started having some bowel movements and therefore the colonoscopy was performed.    On colonoscopy she had polyps and also tortuous sigmoid colon with extensive diverticulosis.    She tolerated a regular breakfast.  I like to make sure she tolerates another meal before going home.  I think she could potentially go home this  evening if she continues to tolerate food and not having any evidence of nausea or vomiting or increasing abdominal pain.      This note was created using Dragon Voice Recognition software.    Nicolette Lucero MD  10/19/24  14:35 EDT

## 2024-10-19 NOTE — PLAN OF CARE
Problem: Adult Inpatient Plan of Care  Goal: Plan of Care Review  Outcome: Met  Goal: Patient-Specific Goal (Individualized)  Outcome: Met  Goal: Absence of Hospital-Acquired Illness or Injury  Outcome: Met  Intervention: Identify and Manage Fall Risk  Recent Flowsheet Documentation  Taken 10/19/2024 1015 by Vanda Bearden LPN  Safety Promotion/Fall Prevention:   nonskid shoes/slippers when out of bed   safety round/check completed   assistive device/personal items within reach   clutter free environment maintained  Taken 10/19/2024 0808 by Vanda Bearden LPN  Safety Promotion/Fall Prevention:   safety round/check completed   room organization consistent   assistive device/personal items within reach   clutter free environment maintained  Intervention: Prevent Skin Injury  Recent Flowsheet Documentation  Taken 10/19/2024 0808 by Vanda Bearden LPN  Body Position: position changed independently  Intervention: Prevent and Manage VTE (Venous Thromboembolism) Risk  Recent Flowsheet Documentation  Taken 10/19/2024 0808 by Vanda Bearden LPN  VTE Prevention/Management: patient refused intervention  Intervention: Prevent Infection  Recent Flowsheet Documentation  Taken 10/19/2024 1015 by Vanda Bearden LPN  Infection Prevention: hand hygiene promoted  Taken 10/19/2024 0808 by Vanda Bearden LPN  Infection Prevention: hand hygiene promoted  Goal: Optimal Comfort and Wellbeing  Outcome: Met  Goal: Readiness for Transition of Care  Outcome: Met     Problem: Intestinal Obstruction  Goal: Optimal Bowel Function  Outcome: Met  Intervention: Promote Bowel Function  Recent Flowsheet Documentation  Taken 10/19/2024 1015 by Vanda Bearden LPN  Head of Bed (HOB) Positioning: HOB elevated  Taken 10/19/2024 0808 by Vanda Bearden LPN  Body Position: position changed independently  Goal: Fluid and Electrolyte Balance  Outcome: Met  Goal: Absence of Infection Signs and Symptoms  Outcome: Met  Goal: Optimize Nutrition Status  Outcome: Met  Goal: Optimal Pain  Control and Function  Outcome: Met  Intervention: Prevent or Manage Pain  Recent Flowsheet Documentation  Taken 10/19/2024 0808 by Vanda Bearden LPN  Diversional Activities: television   Goal Outcome Evaluation:      Patient discharging home.

## 2024-10-19 NOTE — OUTREACH NOTE
Prep Survey      Flowsheet Row Responses   [facility] patient discharged from? [facility]   Is LACE score < 7 ? No   Eligibility [facility]   Date of Admission 10/17/24   Date of Discharge 10/19/24   Discharge Disposition Home or Self Care   Discharge diagnosis Small bowel obstruction  [LAPAROTOMY EXPLORATORY]   Does the patient have one of the following disease processes/diagnoses(primary or secondary)? General Surgery   Does the patient have Home health ordered? No   Is there a DME ordered? No   Prep survey completed? Yes            Ifeoma SILVER - Registered Nurse

## 2024-10-21 ENCOUNTER — TELEPHONE (OUTPATIENT)
Dept: FAMILY MEDICINE CLINIC | Facility: CLINIC | Age: 69
End: 2024-10-21
Payer: MEDICARE

## 2024-10-21 ENCOUNTER — TELEPHONE (OUTPATIENT)
Dept: ONCOLOGY | Facility: CLINIC | Age: 69
End: 2024-10-21

## 2024-10-21 ENCOUNTER — TRANSITIONAL CARE MANAGEMENT TELEPHONE ENCOUNTER (OUTPATIENT)
Dept: CALL CENTER | Facility: HOSPITAL | Age: 69
End: 2024-10-21
Payer: MEDICARE

## 2024-10-21 NOTE — TELEPHONE ENCOUNTER
Caller: Marilyn Pineda    Relationship to patient: Self    Best call back number:     Chief complaint:     Type of visit: HOSPITAL FOLLOW UP    Requested date:      If rescheduling, when is the original appointment:      Additional notes:PATIENT WAS RELEASED FROM [facility] ON 10/20/24 AND NEEDS A HOSPITAL FOLLOW UP APPOINTMENT.  I TRIED TO CALL THE OFFICE BUT GOT NO ANSWER.

## 2024-10-21 NOTE — OUTREACH NOTE
Call Center TCM Note      Flowsheet Row Responses   [facility] patient discharged from? [facility]   Does the patient have one of the following disease processes/diagnoses(primary or secondary)? General Surgery   TCM attempt successful? Yes   Call start time 1410   Call end time 1414   Discharge diagnosis Small bowel obstruction, resolved w/o surgical intervention. Pt did have colonoscopy w/poypectomy x 2 during admit   Meds reviewed with patient/caregiver? Yes   Does the patient have all medications related to this admission filled (includes all antibiotics, pain medications, etc.) N/A   Prescription comments No changes to pt medications at this discharge   Is the patient taking all medications as directed (includes completed medication regime)? Yes   Does the patient have an appointment with their PCP within 7-14 days of discharge? No appointments available   Nursing Interventions Routed TCM call to PCP office, PCP office requested to make appointment - message sent   Has home health visited the patient within 72 hours of discharge? N/A   Psychosocial issues? No   Did the patient receive a copy of their discharge instructions? Yes   Nursing interventions Reviewed instructions with patient   What is the patient's perception of their health status since discharge? Improving   If the patient is a current smoker, are they able to teach back resources for cessation? Not a smoker   Is the patient/caregiver able to teach back the hierarchy of who to call/visit for symptoms/problems? PCP, Specialist, Home health nurse, Urgent Care, ED, 911 Yes   TCM call completed? Yes   Wrap up additional comments D/C DX,  SBO,  N/V - pt feeling pretty well today. No questions. Pt has NP Hosp follow up with [facility] on 11/05/2024. Pt very much wants TCM APPT with PCP Dr Cinthia Brown however PCP has no available dates I could access to sched TCM APPT by 11/02/2024.   Call end time 1414            Fatemeh Zhang MA    10/21/2024,  14:17 EDT

## 2024-10-21 NOTE — TELEPHONE ENCOUNTER
Caller: Marilyn Pineda    Relationship: Self    Best call back number: [phone]     What is the best time to reach you: ASAP    Who are you requesting to speak with (clinical staff, provider,  specific staff member): SCHEDULING      What was the call regarding: PLEASE CALL PT TO SCHEDULE HOSPITAL FOLLOW UP WITH DR MEJIA.  SHE SAYS SHE WAS IN HOSPITAL OCT.6, WAS RELEASED, DID NOT MAKE HER FOLLOW UP APPT AT THAT TIME.  SINCE THEN SHE HAS BEEN BACK IN THE HOSPITAL AND WAS JUST RELEASED AGAIN THIS WEEKEND.  PLEASE CALL PT TO SCHEDULE ACCORDINGLY.

## 2024-10-22 LAB
LAB AP CASE REPORT: NORMAL
PATH REPORT.FINAL DX SPEC: NORMAL
PATH REPORT.GROSS SPEC: NORMAL

## 2024-10-23 ENCOUNTER — TREATMENT (OUTPATIENT)
Dept: PHYSICAL THERAPY | Facility: CLINIC | Age: 69
End: 2024-10-23
Payer: MEDICARE

## 2024-10-23 DIAGNOSIS — M21.861 ACQUIRED POSTERIOR EQUINUS, RIGHT: ICD-10-CM

## 2024-10-23 DIAGNOSIS — M19.071 ARTHRITIS OF MIDTARSAL JOINT OF RIGHT FOOT: ICD-10-CM

## 2024-10-23 DIAGNOSIS — M79.89 SWELLING OF LOWER EXTREMITY: ICD-10-CM

## 2024-10-23 DIAGNOSIS — M79.671 RIGHT FOOT PAIN: Primary | ICD-10-CM

## 2024-10-23 DIAGNOSIS — M21.41 ACQUIRED PES PLANUS, RIGHT: ICD-10-CM

## 2024-10-23 NOTE — PROGRESS NOTES
Physical Therapy Re-Certification of Plan of Care        Patient: Marilyn Pineda   : 1955  Diagnosis/ICD-10 Code:  Right foot pain [M79.671]  Referring practitioner: AGNES Kwong DPM  Date of Initial Visit: 10/23/2024  Today's Date: 10/23/2024  Patient seen for 8 sessions      Subjective:   Visit Diagnoses:    ICD-10-CM ICD-9-CM   1. Right foot pain  M79.671 729.5   2. 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 Acquired posterior equinus, right  M21.861 736.72   4. Acquired pes planus, right  M21.41 734   5. Swelling of lower extremity  M79.89 729.81       Marilyn Pineda reports: recent hospitalizations due to bowel obstruction. Pt reports fatigue and increased swelling in R LE. States her ankle has been more stiff since being unable to do the HEP.   Subjective Questionnaire: Oswestry: 71.25  Clinical Progress: regression due to recent hospitalization x2.   Home Program Compliance: Yes  Treatment has included: therapeutic exercise, neuromuscular re-education, manual therapy, and therapeutic activity    Subjective   Objective          Observations     Right Ankle/Foot   Positive for edema.     Additional Ankle/Foot Observation Details  Pes planus R>L    Active Range of Motion   Left Ankle/Foot   Normal active range of motion    Right Ankle/Foot   Dorsiflexion (ke): 0 degrees   Plantar flexion: 40 degrees   Inversion: 20 degrees   Eversion: 12 degrees     Joint Play     Right Ankle/Foot  Hypomobile in the talocrural joint and subtalar joint.     Strength/Myotome Testing     Left Ankle/Foot   Normal strength    Right Ankle/Foot   Dorsiflexion: 3  Plantar flexion: 4-  Inversion: 2+  Eversion: 4-    Additional Strength Details  Within available ROM    Ambulation     Observational Gait     Additional Observational Gait Details  Trendelenburg       Assessment & Plan       Assessment  Impairments: abnormal gait, abnormal or restricted ROM, impaired balance, impaired physical strength, lacks  appropriate home exercise program, pain with function and safety issue   Functional limitations: walking, uncomfortable because of pain, standing, stooping and unable to perform repetitive tasks   Assessment details: Pt has attended 8 visits in skilled PT. She has had 2 recent hosptilaizations since last visit with notable regression in R LE strength. This is expected due to recent medial complications. Pt cont to present to skilled PT with R foot pain, pes planus, impaired gait, weakness and decreased AROM of R ankle. Pt would benefit from skilled PT to address the above findings and improve pt's ease with functional mobility and return to PLOF.    Barriers to therapy: None  Prognosis: good    Goals  Plan Goals: ST.Pt will report reduction in worst pain level to 2/10 in 2 weeks. NOT MET  2.Pt will improve AROM of R ankle DF to 5 deg in 2 weeks.NOT MET  3. Pt will demo good tolerance and compliance with HEP in 2 weeks. MET    LT.Pt will be independent with HEP in 6 weeks for self management and prevention of re-occurrence. NOT MET  2.Pt will improve strength of R ankle to 4+/5 throughout in 6 weeks.NOT MET  3.Pt will improve AROM of R ankle to WFL in 6 weeks.NOT MET  4. Pt will ambulate with normalized gait pattern in 6 weeks.NOT MET    Plan  Therapy options: will be seen for skilled therapy services  Planned modality interventions: cryotherapy, electrical stimulation/Russian stimulation, thermotherapy (hydrocollator packs) and ultrasound  Planned therapy interventions: flexibility, functional ROM exercises, gait training, home exercise program, joint mobilization, neuromuscular re-education, manual therapy, strengthening, stretching and therapeutic activities  Frequency: 2x week  Duration in weeks: 6  Treatment plan discussed with: patient      Progress toward previous goals: Partially Met        Recommendations: Continue as planned      Based upon review of the patient's progress and continued therapy  plan, it is my medical opinion that Marilyn Pineda should continue physical therapy treatment at [facility] PHYSICAL THERAPY  [address] [place] [address] [phone].    Timed:  Therapeutic Exercise:    15     mins  00800;     Therapeutic Activity:     10     mins  71402;         Untimed:  Re-Eval      15      mins  86912    Timed Treatment:   25   mins   Total Treatment:     40   mins      PT Signature: Olivia Gutierrez PT, DPT  PT license: [id number]       Certification Period: 1/20/2025  I certify that the therapy services are furnished while this patient is under my care.  The services outlined above are required by this patient, and will be reviewed every 90 days.    Physician Signature: _________________________________________    PHYSICIAN: AGNES Kwong DPM  DATE:

## 2024-10-28 ENCOUNTER — OFFICE VISIT (OUTPATIENT)
Dept: FAMILY MEDICINE CLINIC | Facility: CLINIC | Age: 69
End: 2024-10-28
Payer: MEDICARE

## 2024-10-28 ENCOUNTER — TREATMENT (OUTPATIENT)
Dept: PHYSICAL THERAPY | Facility: CLINIC | Age: 69
End: 2024-10-28
Payer: MEDICARE

## 2024-10-28 VITALS
BODY MASS INDEX: 31.45 KG/M2 | OXYGEN SATURATION: 98 % | RESPIRATION RATE: 16 BRPM | HEIGHT: 64 IN | DIASTOLIC BLOOD PRESSURE: 76 MMHG | WEIGHT: 184.25 LBS | HEART RATE: 56 BPM | SYSTOLIC BLOOD PRESSURE: 124 MMHG

## 2024-10-28 DIAGNOSIS — M21.861 ACQUIRED POSTERIOR EQUINUS, RIGHT: ICD-10-CM

## 2024-10-28 DIAGNOSIS — M19.071 ARTHRITIS OF MIDTARSAL JOINT OF RIGHT FOOT: ICD-10-CM

## 2024-10-28 DIAGNOSIS — K56.609 SBO (SMALL BOWEL OBSTRUCTION): ICD-10-CM

## 2024-10-28 DIAGNOSIS — D50.9 IRON DEFICIENCY ANEMIA, UNSPECIFIED IRON DEFICIENCY ANEMIA TYPE: ICD-10-CM

## 2024-10-28 DIAGNOSIS — M21.41 ACQUIRED PES PLANUS, RIGHT: ICD-10-CM

## 2024-10-28 DIAGNOSIS — M79.89 SWELLING OF LOWER EXTREMITY: ICD-10-CM

## 2024-10-28 DIAGNOSIS — K92.9: ICD-10-CM

## 2024-10-28 DIAGNOSIS — M79.671 RIGHT FOOT PAIN: Primary | ICD-10-CM

## 2024-10-28 DIAGNOSIS — Z09 HOSPITAL DISCHARGE FOLLOW-UP: Primary | ICD-10-CM

## 2024-10-28 NOTE — PROGRESS NOTES
Message dr. Ricardo    Restart fiber next week    Introduce vitamins slowly    If one more bowel obstruction will    Possible IV iron     Okay to try lymphedema supplement

## 2024-10-29 NOTE — PROGRESS NOTES
Physical Therapy Daily Treatment Note  Visit: 9        Patient: Marilyn Pineda   : 1955  Diagnosis/ICD-10 Code:  Right foot pain [M79.671]  Referring practitioner: AGNES Kwong DPM  Date of Initial Visit: Type: THERAPY  Noted: 2024  Today's Date: 10/29/2024  Patient seen for 9 sessions       Visit Diagnoses:    ICD-10-CM ICD-9-CM   1. Right foot pain  M79.671 729.5   2. 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 Acquired posterior equinus, right  M21.861 736.72   4. Acquired pes planus, right  M21.41 734   5. Swelling of lower extremity  M79.89 729.81       Subjective     Marilyn Pineda reports: feeling better after recent hospitalization. Still fatigued, but improving.     Objective   See Exercise, Manual, and Modality Logs for complete treatment.       Assessment/Plan  Worked on ROM and gentle strength and balance training today to pt's tolerance. Joint mobilizations and PROM performed to R foot and ankle with good tolerance.     Progress per Plan of Care.         Timed:  Manual Therapy:    15     mins  71250;  Therapeutic Exercise:    15     mins  46393;     Neuromuscular Karina:    8    mins  73074;        Timed Treatment:   38   mins   Total Treatment:     38   mins        Olivia Gutierrez PT, DPT  Physical Therapist  PT license: [id number]

## 2024-11-01 NOTE — PROGRESS NOTES
HEMATOLOGY ONCOLOGY HOSPITAL FOLLOW UP       Patient name: Marilyn Pineda  : 1955  MRN: [id number]  Primary Care Physician: Cinthia Brown MD  Referring Physician: No ref. provider found  Reason For Consult:         History of Present Illness:    Marilyn Pineda is a 68 y.o. female who presented to [facility] on 10/6/2024 with complaints of nausea and vomiting, diarrhea for several days. She denies fever or chills.      10/6/2024  CT abdomen pelvis without contrast  Impression:  1.Abnormal soft tissue density centered in the right hemipelvis, nonspecific in appearance but suspected to largely represent fibrosis and/or post XRT changes. Other possibilities such as underlying neoplasm or infection are considered less likely,   however, not definitively excluded.  2.Partial versus early complete small bowel obstruction in this region.  3.Apparent wall thickening of the cecum favored to be secondary to post XRT change although superimposed active inflammation or neoplasm cannot be definitively excluded.  4.Obstruction of the distal right ureter by the above process with resulting severe right hydronephrosis and hydroureter. Severe right renal parenchymal atrophy.  5.Previous hysterectomy  6.Mesentery and right inguinal adenopathy in the lower abdomen and pelvis could be inflammatory or neoplastic in etiology.  7.Multiple vascular collaterals noted in the anterior pelvic wall suggesting some degree of obstruction of the right external and internal iliac artery and veins. Evaluation is limited by the lack of IV contrast.        10/6/2024 CT chest abdomen pelvis with contrast  IMPRESSION:  Chest-  No evidence of metastatic disease. No acute process demonstrated     Abdomen/pelvis-  1. There is ill-defined soft tissue in the right side of the pelvis. Finding could represent treatment related changes and fibrosis. Tumor not excluded, in comparison with any outside prior imaging would be helpful  with this regard. The abnormality   results in right ureteral obstruction which may be longstanding given the presence of cortical atrophy, and right iliac vein obstruction which is associated with collateral formation.  2. Distal small bowel obstruction with transition at the terminal ileum. Thickening of the terminal ileum may relate to radiation enteropathy or fibrosis. This may also account for thickening of the base of the cecum. Wall thickening of the urinary   bladder suggests radiation cystitis  3. Abnormal appearance of the right SI joint and adjacent bones. 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 The presence of infection of the SI joint is not excluded  4. Thickening of the right iliopsoas muscle may be treatment related or secondary to adjacent SI joint infection if present  5. 3 mm indeterminate liver lesion, likely cyst. Follow-up recommended  6. Mild mesenteric adenopathy, and enlarged right groin lymph nodes     10/6/2024 CT head w/wo contrast  Impression: No acute intracranial pathology. No abnormal intracranial enhancement.      Urology has been consulted. They believe hydronephrosis is chronic and unlikely to be the source of her nausea.  GI and general surgery have also been consulted.     10/07/24  Hematology/Oncology was consulted.  Patient reports a remote history of cervical cancer diagnosed in 1993 status post hysterectomy.  Of note, she did have a positive Cologuard screening in June 2024 and is scheduled for outpatient colonoscopy.     10/7/24: patient seen today for initial evaluation. Noted to be comfortable. Denied any acute complaints. No abdominal pain at this time. Nausea/vomiting has improved.     10/8/24: Right inguinal LN Biopsy:  Pathology: Scant benign reactive lymphoid tissue. No malignancy identified  Flow: No significant lymphoid immunophenotypic abnormalities (limited sample)      10/17/24-10/19/24: Interval Hospitalization update:  Patient was doing colonoscopy  "prep when she experienced the nausea vomiting that brought her to the ED.  Repeat KUB was obtained and GI was notified of resolving SBO.  GI performed colonoscopy on 10-, under general anesthesia.  Per GI, \"-Partial small bowel obstruction seems improved clinically.  An OG tube was placed during the procedure with no fluid suctioned out.  Status post colonoscopy, pt was able to move her bowels and was discharged after she was noted to have good tolerance to food.    He/She  has a past medical history of Allergic, Anemia, Arthritis (2024), Cancer (), Pain of right hip joint (2018), Right lower lobe pneumonia (2018), and Thrush, oral (2018).     PCP: Cinthia Brown MD    Subjective:  24: Patient seen today for follow-up after her hospitalization as above to discuss biopsy findings.  He was again hospitalized after her discharge as above for recurrent small bowel obstruction which resolved with conservative management.  She is clinically stable at present and denied any GI symptoms.    Past Medical History:   Diagnosis Date    Allergic     amoxicilin clayvix    Anemia     Arthritis 2024    In right  foot    Cancer     cervial cancer/ radiation and had hysterectomy    Colon polyp 10-18-24    Pain of right hip joint 2018    Right lower lobe pneumonia 2018    Small bowel obstruction 10/2024    Thrush, oral 2018       Past Surgical History:   Procedure Laterality Date     SECTION  1984    COLONOSCOPY N/A 10/18/2024    Procedure: COLONOSCOPY WITH POLYPECTOMY X2;  Surgeon: NADEEM Aggarwal MD;  Location: [facility] ENDOSCOPY;  Service: Gastroenterology;  Laterality: N/A;  POST: POLYPS    HAND SURGERY      HYSTERECTOMY      LYMPH NODE BIOPSY  10-8-24    US GUIDED LYMPH NODE BIOPSY  10/08/2024         Current Outpatient Medications:     BIOTIN PO, Take 1 tablet by mouth Daily. (Patient not taking: Reported on 10/28/2024), Disp: , Rfl:     " cyanocobalamin (VITAMIN B-12) 2500 MCG tablet tablet, Take 500 mcg by mouth Daily. (Patient not taking: Reported on 10/28/2024), Disp: , Rfl:     Ferrous Sulfate (IRON PO), Take 65 mg by mouth Every Other Day., Disp: , Rfl:     Misc Natural Products (BEET ROOT PO), Take 500 mg by mouth Daily. (Patient not taking: Reported on 10/28/2024), Disp: , Rfl:     Multiple Minerals-Vitamins (Calcium-Magnesium-Zinc-D3) tablet, Take 1 tablet by mouth Daily., Disp: , Rfl:     multivitamin with minerals (MULTIVITAMIN WOMEN PO), Take 1 tablet by mouth Daily., Disp: , Rfl:     ondansetron ODT (ZOFRAN-ODT) 4 MG disintegrating tablet, Place 1 tablet on the tongue Every 6 (Six) Hours As Needed for Nausea or Vomiting., Disp: , Rfl:     vitamin B-6 (PYRIDOXINE) 50 MG tablet, Take 2 tablets by mouth Daily. (Patient not taking: Reported on 10/28/2024), Disp: , Rfl:     Allergies   Allergen Reactions    Augmentin [Amoxicillin-Pot Clavulanate] Hives       Family History   Problem Relation Age of Onset    COPD Mother         from smoking for years    Miscarriages / Stillbirths Mother         had a miscarriage    Thyroid disease Father         is taking thyroid pil;    Cancer Maternal Grandmother         had lung cancer    Diabetes Brother        Cancer-related family history includes Cancer in her maternal grandmother.    Social History     Tobacco Use    Smoking status: Never    Smokeless tobacco: Never   Vaping Use    Vaping status: Never Used   Substance Use Topics    Alcohol use: Not Currently     Comment: may drink a little wine but only very occasionally    Drug use: No     Social History     Social History Narrative    Not on file          ROS:   Review of Systems   Constitutional: Negative.  Positive for fatigue.   HENT: Negative.     Eyes: Negative.    Respiratory: Negative.     Cardiovascular: Negative.    Gastrointestinal: Negative.    Endocrine: Negative.    Genitourinary: Negative.    Musculoskeletal: Negative.    Skin:  "Negative.    Allergic/Immunologic: Negative.    Neurological: Negative.    Hematological: Negative.    Psychiatric/Behavioral: Negative.         Objective:  Vital signs:  Vitals:    11/05/24 1100   BP: 158/91   Pulse: 72   SpO2: 99%   Weight: 84.3 kg (185 lb 12.8 oz)   Height: 162.6 cm (64\")   PainSc: 0-No pain     Body mass index is 31.89 kg/m².  ECOG  (0) Fully active, able to carry on all predisease performance without restriction    Physical Exam:   Physical Exam  Constitutional:       Appearance: Normal appearance. She is normal weight.   HENT:      Head: Normocephalic and atraumatic.      Right Ear: External ear normal.      Left Ear: External ear normal.      Nose: Nose normal.      Mouth/Throat:      Mouth: Mucous membranes are moist.      Pharynx: Oropharynx is clear.   Eyes:      Extraocular Movements: Extraocular movements intact.      Conjunctiva/sclera: Conjunctivae normal.      Pupils: Pupils are equal, round, and reactive to light.   Cardiovascular:      Rate and Rhythm: Normal rate.      Pulses: Normal pulses.   Pulmonary:      Effort: Pulmonary effort is normal.   Abdominal:      General: Abdomen is flat.      Palpations: Abdomen is soft.   Musculoskeletal:         General: Normal range of motion.      Cervical back: Normal range of motion and neck supple.   Skin:     General: Skin is warm.   Neurological:      Mental Status: She is alert.   Psychiatric:         Mood and Affect: Mood normal.         Behavior: Behavior normal.         Thought Content: Thought content normal.         Judgment: Judgment normal.         Lab Results - Last 18 Months   Lab Units 11/05/24  1252 10/19/24  0009 10/18/24  0405   WBC 10*3/mm3 7.77 9.90 7.99   HEMOGLOBIN g/dL 10.2* 9.3* 9.2*   HEMATOCRIT % 33.7* 29.6* 29.9*   PLATELETS 10*3/mm3 309 380 409   MCV fL 90.3 85.1 84.7     Lab Results - Last 18 Months   Lab Units 10/19/24  0009 10/18/24  0405 10/17/24  1049 10/07/24  0040 10/06/24  0937 10/01/24  1317   SODIUM mmol/L " "135* 136 136   < > 138 140   POTASSIUM mmol/L 3.6 3.3* 3.3*   < > 4.0 4.5   CHLORIDE mmol/L 101 99 92*   < > 95* 101   CO2 mmol/L 26.8 26.6 27.6   < > 27.6 28.0   BUN mg/dL 19 21 18   < > 27* 16   CREATININE mg/dL 1.04* 1.72* 1.61*   < > 1.04* 1.03*   CALCIUM mg/dL 8.4* 8.5* 9.9   < > 9.4 9.5   BILIRUBIN mg/dL  --   --  0.5  --  0.6 0.4   ALK PHOS U/L  --   --  92  --  87 82   ALT (SGPT) U/L  --   --  18  --  9 12   AST (SGOT) U/L  --   --  20  --  19 16   GLUCOSE mg/dL 149* 131* 138*   < > 158* 92    < > = values in this interval not displayed.       Lab Results   Component Value Date    GLUCOSE 149 (H) 10/19/2024    BUN 19 10/19/2024    CREATININE 1.04 (H) 10/19/2024    BCR 18.3 10/19/2024    K 3.6 10/19/2024    CO2 26.8 10/19/2024    CALCIUM 8.4 (L) 10/19/2024    ALBUMIN 4.6 10/17/2024    LABIL2 1.1 09/10/2018    AST 20 10/17/2024    ALT 18 10/17/2024       No results for input(s): \"APTT\", \"INR\", \"PTT\" in the last 52297 hours.    Lab Results   Component Value Date    IRON 19 (L) 10/01/2024    TIBC 228 (L) 10/01/2024    FERRITIN 506.00 (H) 10/01/2024       No results found for: \"FOLATE\"    No results found for: \"OCCULTBLD\"    No results found for: \"RETICCTPCT\"  Lab Results   Component Value Date    AFIJVPZY28 981 (H) 05/24/2024     No results found for: \"SPEP\", \"UPEP\"  Uric Acid   Date Value Ref Range Status   02/20/2018 4.9 2.6 - 8.0 mg/dL Final     No results found for: \"TREMAYNE\", \"RF\", \"SEDRATE\"  Lab Results   Component Value Date    HAPTOGLOBIN 582 (H) 01/22/2018     No results found for: \"PTT\", \"INR\"  No results found for: \"\"  No results found for: \"CEA\"  No components found for: \"CA-19-9\"  No results found for: \"PSA\"  No results found for: \"SEDRATE\"      XR Abdomen KUB    Result Date: 10/18/2024  Impression: Mild gaseous distention of small bowel, decreased compared to 10/17/2024 KUB. Electronically Signed: Berta Chua  10/18/2024 10:05 AM EDT  Workstation ID: [id number]    XR Abdomen KUB    Result Date: " 10/17/2024  Impression: 1.Nasogastric tube tip is within the stomach. 2.Abnormal bowel gas pattern which could relate to small bowel obstruction. Electronically Signed: Faraz Collier MD  10/17/2024 3:41 PM EDT  Workstation ID: [id number]    CT Abdomen Pelvis Without Contrast    Result Date: 10/17/2024  1. Findings consistent with high-grade small bowel obstruction related to distal ileal narrowing/stricture in the right lower quadrant of the abdomen. The small bowel dilation appears increased since 10/6/2024. 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 2. Right hydronephrosis appears diminished since 10/6/2024. The right kidney is severely atrophic. 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 3. 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 Stable appearance of mildly enlarged right inguinal lymph node. This represents the node which underwent previous biopsy with benign findings. 5. Fluid distention of the thoracic esophagus and significant fluid distention of the stomach. Consider NG tube decompression. Electronically Signed: Michelle Hawkins MD  10/17/2024 12:11 PM EDT  Workstation ID: [id number]    US Guided Lymph Node Biopsy    Result Date: 10/8/2024  Successful ultrasound-guided right inguinal lymph node core biopsy. Report dictated by: Justin Basilio DNP  I have personally reviewed this case and agree with the findings above: Electronically Signed: Elan Skinner MD  10/8/2024 1:21 PM EDT  Workstation ID: [id number]     Assessment & Plan     Abnormal soft tissue density in right hemipelvis: Suspicious for malignancy  Mesentery and right inguinal adenopathy  -imaging findings were reviewed as above. The overall picture is concerning for malignancy vs infectious/inflammatory etiology. Less likely to be sequela of prior radiation given long time  having passed since XRT (30 years).   -She has right groin lymphadenopathy, this was biopsied on 10/8/24.  This has been reported negative.  -CT abdomen pelvis on 10/17/2024 again reported ill-defined thickening in the right hemipelvis retroperitoneum extending along the pelvic sidewall and continues to the lack muscle, thought to be radiation-induced fibrosis but unclear etiology.  -Will obtain MRI pelvis to further evaluate these findings.  - levels reported normal     Partial small bowel obstruction  -Patient was discharged after conservative management of bowel obstruction, however she had a recurrent episode for which she was hospitalized again on 1015 as above.  Resolved after supportive care without surgical intervention.  -Symptoms have improved.     Normocytic anemia  -Previously noted leukocytosis and thrombocytosis has improved  -Noted to have improvement in anemia, hemoglobin improved to 10.2 today.  MCV 90.3  -Iron panel is not consistent with IRISH, but more likely to be Anemia of chronic disease    Continue to monitor CBC   -Soluble transferrin receptor level is pending     Personal history of cervical cancer diagnosed in 1990s  -Patient is status post hysterectomy and radiation treatment  -low concern for recurrence of  primary at this time.  -urology evaluated the pt. No further interventions recommended.     3-week telehealth follow-up with lab results.  Clinic follow-up in 3 months with repeat labs.        Thank you very much for providing the opportunity to participate in this patient’s care. Please do not hesitate to call if there are any other questions.

## 2024-11-03 PROBLEM — K92.9 RADIATION DAMAGE TO DIGESTIVE SYSTEM: Status: ACTIVE | Noted: 2024-11-03

## 2024-11-03 PROBLEM — D50.9 IRON DEFICIENCY ANEMIA: Status: ACTIVE | Noted: 2024-11-03

## 2024-11-05 ENCOUNTER — LAB (OUTPATIENT)
Dept: LAB | Facility: HOSPITAL | Age: 69
End: 2024-11-05
Payer: MEDICARE

## 2024-11-05 ENCOUNTER — OFFICE VISIT (OUTPATIENT)
Dept: ONCOLOGY | Facility: CLINIC | Age: 69
End: 2024-11-05
Payer: MEDICARE

## 2024-11-05 VITALS
OXYGEN SATURATION: 99 % | DIASTOLIC BLOOD PRESSURE: 91 MMHG | HEART RATE: 72 BPM | BODY MASS INDEX: 31.72 KG/M2 | SYSTOLIC BLOOD PRESSURE: 158 MMHG | HEIGHT: 64 IN | WEIGHT: 185.8 LBS

## 2024-11-05 DIAGNOSIS — D39.8: ICD-10-CM

## 2024-11-05 DIAGNOSIS — R19.00 PELVIC MASS: ICD-10-CM

## 2024-11-05 DIAGNOSIS — D64.9 ANEMIA, UNSPECIFIED TYPE: ICD-10-CM

## 2024-11-05 DIAGNOSIS — D64.9 ANEMIA, UNSPECIFIED TYPE: Primary | ICD-10-CM

## 2024-11-05 LAB
BASOPHILS # BLD AUTO: 0.02 10*3/MM3 (ref 0–0.2)
BASOPHILS NFR BLD AUTO: 0.3 % (ref 0–1.5)
CANCER AG125 SERPL QL: 22.2 U/ML (ref 0–38.1)
DEPRECATED RDW RBC AUTO: 55.1 FL (ref 37–54)
EOSINOPHIL # BLD AUTO: 0.17 10*3/MM3 (ref 0–0.4)
EOSINOPHIL NFR BLD AUTO: 2.2 % (ref 0.3–6.2)
ERYTHROCYTE [DISTWIDTH] IN BLOOD BY AUTOMATED COUNT: 17 % (ref 12.3–15.4)
FERRITIN SERPL-MCNC: 397 NG/ML (ref 13–150)
HCT VFR BLD AUTO: 33.7 % (ref 34–46.6)
HGB BLD-MCNC: 10.2 G/DL (ref 12–15.9)
IRON 24H UR-MRATE: 17 MCG/DL (ref 37–145)
IRON SATN MFR SERPL: 7 % (ref 20–50)
LYMPHOCYTES # BLD AUTO: 1.19 10*3/MM3 (ref 0.7–3.1)
LYMPHOCYTES NFR BLD AUTO: 15.3 % (ref 19.6–45.3)
MCH RBC QN AUTO: 27.3 PG (ref 26.6–33)
MCHC RBC AUTO-ENTMCNC: 30.3 G/DL (ref 31.5–35.7)
MCV RBC AUTO: 90.3 FL (ref 79–97)
MONOCYTES # BLD AUTO: 0.67 10*3/MM3 (ref 0.1–0.9)
MONOCYTES NFR BLD AUTO: 8.6 % (ref 5–12)
NEUTROPHILS NFR BLD AUTO: 5.72 10*3/MM3 (ref 1.7–7)
NEUTROPHILS NFR BLD AUTO: 73.6 % (ref 42.7–76)
PLATELET # BLD AUTO: 309 10*3/MM3 (ref 140–450)
PMV BLD AUTO: 8.4 FL (ref 6–12)
RBC # BLD AUTO: 3.73 10*6/MM3 (ref 3.77–5.28)
RETICS # AUTO: 0.07 10*6/MM3 (ref 0.02–0.13)
RETICS/RBC NFR AUTO: 1.75 % (ref 0.7–1.9)
TIBC SERPL-MCNC: 234 MCG/DL (ref 298–536)
TRANSFERRIN SERPL-MCNC: 157 MG/DL (ref 200–360)
WBC NRBC COR # BLD AUTO: 7.77 10*3/MM3 (ref 3.4–10.8)

## 2024-11-05 PROCEDURE — 83540 ASSAY OF IRON: CPT | Performed by: STUDENT IN AN ORGANIZED HEALTH CARE EDUCATION/TRAINING PROGRAM

## 2024-11-05 PROCEDURE — 82728 ASSAY OF FERRITIN: CPT | Performed by: STUDENT IN AN ORGANIZED HEALTH CARE EDUCATION/TRAINING PROGRAM

## 2024-11-05 PROCEDURE — 85025 COMPLETE CBC W/AUTO DIFF WBC: CPT

## 2024-11-05 PROCEDURE — 86304 IMMUNOASSAY TUMOR CA 125: CPT | Performed by: STUDENT IN AN ORGANIZED HEALTH CARE EDUCATION/TRAINING PROGRAM

## 2024-11-05 PROCEDURE — 36415 COLL VENOUS BLD VENIPUNCTURE: CPT

## 2024-11-05 PROCEDURE — 84466 ASSAY OF TRANSFERRIN: CPT | Performed by: STUDENT IN AN ORGANIZED HEALTH CARE EDUCATION/TRAINING PROGRAM

## 2024-11-05 PROCEDURE — 85045 AUTOMATED RETICULOCYTE COUNT: CPT | Performed by: STUDENT IN AN ORGANIZED HEALTH CARE EDUCATION/TRAINING PROGRAM

## 2024-11-06 ENCOUNTER — TREATMENT (OUTPATIENT)
Dept: PHYSICAL THERAPY | Facility: CLINIC | Age: 69
End: 2024-11-06
Payer: MEDICARE

## 2024-11-06 DIAGNOSIS — M79.89 SWELLING OF LOWER EXTREMITY: ICD-10-CM

## 2024-11-06 DIAGNOSIS — M19.071 ARTHRITIS OF MIDTARSAL JOINT OF RIGHT FOOT: ICD-10-CM

## 2024-11-06 DIAGNOSIS — M79.671 RIGHT FOOT PAIN: Primary | ICD-10-CM

## 2024-11-06 DIAGNOSIS — M21.41 ACQUIRED PES PLANUS, RIGHT: ICD-10-CM

## 2024-11-06 DIAGNOSIS — M21.861 ACQUIRED POSTERIOR EQUINUS, RIGHT: ICD-10-CM

## 2024-11-06 NOTE — PROGRESS NOTES
Physical Therapy Daily Treatment Note  Visit: 10        Patient: Marilyn Pineda   : 1955  Diagnosis/ICD-10 Code:  Right foot pain [M79.671]  Referring practitioner: AGNES Kwong DPM  Date of Initial Visit: Type: THERAPY  Noted: 2024  Today's Date: 2024  Patient seen for 10 sessions       Visit Diagnoses:    ICD-10-CM ICD-9-CM   1. Right foot pain  M79.671 729.5   2. 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 Acquired posterior equinus, right  M21.861 736.72   4. Acquired pes planus, right  M21.41 734   5. Swelling of lower extremity  M79.89 729.81       Subjective     Marilyn Pineda reports: cont general fatigue from recent hospitalization.     Objective   See Exercise, Manual, and Modality Logs for complete treatment.       Assessment/Plan  Working on balance and R ankle strengthening exercises. Utilized mirror technique for assist with INV of R ankle. Pt demo full PROM in all planes.     Progress per Plan of Care.         Timed:  Manual Therapy:    8     mins  66212;  Therapeutic Exercise:    15     mins  25250;     Neuromuscular Karina:    15    mins  11048;        Timed Treatment:   38   mins   Total Treatment:     38   mins        Olivia Gutierrez PT, DPT  Physical Therapist  PT license: [id number]

## 2024-11-08 ENCOUNTER — TELEPHONE (OUTPATIENT)
Dept: ONCOLOGY | Facility: CLINIC | Age: 69
End: 2024-11-08
Payer: MEDICARE

## 2024-11-08 LAB — STFR SERPL-SCNC: 19.2 NMOL/L (ref 12.2–27.3)

## 2024-11-08 NOTE — TELEPHONE ENCOUNTER
----- Message from Marcelino Ricardo sent at 11/8/2024  6:14 AM EST -----  Iron profile is not consistent with iron deficiency anemia.  No indication for IV iron supplementation at this time.  Please update the patient

## 2024-11-08 NOTE — TELEPHONE ENCOUNTER
Per Dr. Ricardo- phoned the patient and let her know her Iron profile is not consistent with iron deficiency anemia.  No indication for IV iron supplementation at this time. Patient v/u

## 2024-11-13 ENCOUNTER — TREATMENT (OUTPATIENT)
Dept: PHYSICAL THERAPY | Facility: CLINIC | Age: 69
End: 2024-11-13
Payer: MEDICARE

## 2024-11-13 DIAGNOSIS — M79.671 RIGHT FOOT PAIN: Primary | ICD-10-CM

## 2024-11-13 DIAGNOSIS — M21.861 ACQUIRED POSTERIOR EQUINUS, RIGHT: ICD-10-CM

## 2024-11-13 DIAGNOSIS — M19.071 ARTHRITIS OF MIDTARSAL JOINT OF RIGHT FOOT: ICD-10-CM

## 2024-11-13 DIAGNOSIS — M21.41 ACQUIRED PES PLANUS, RIGHT: ICD-10-CM

## 2024-11-13 DIAGNOSIS — M79.89 SWELLING OF LOWER EXTREMITY: ICD-10-CM

## 2024-11-13 NOTE — PROGRESS NOTES
Physical Therapy Daily Treatment Note  Visit: 11        Patient: Marilyn Pineda   : 1955  Diagnosis/ICD-10 Code:  Right foot pain [M79.671]  Referring practitioner: AGNES Kwong DPM  Date of Initial Visit: Type: THERAPY  Noted: 2024  Today's Date: 2024  Patient seen for 11 sessions       Visit Diagnoses:    ICD-10-CM ICD-9-CM   1. Right foot pain  M79.671 729.5   2. 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 Acquired posterior equinus, right  M21.861 736.72   4. Acquired pes planus, right  M21.41 734   5. Swelling of lower extremity  M79.89 729.81       Subjective     Marilyn Pineda reports: overall feeling better. R ankle pain is improving.     Objective   See Exercise, Manual, and Modality Logs for complete treatment.       Assessment/Plan  Focused on balance and LE strengthening today. Demo improved control with balance activities and able to tolerate progression without LOB. Still demo difficulty with activation of R ankle IV.    Progress per Plan of Care.         Timed:  Therapeutic Exercise:    15     mins  65923;     Neuromuscular Karina:    15    mins  60400;    Therapeutic Activity:     8     mins  12205;         Timed Treatment:   38   mins   Total Treatment:     38   mins        Olivia Gutierrez PT, DPT  Physical Therapist  PT license: [id number]

## 2024-11-16 NOTE — PROGRESS NOTES
"Transitional Care Follow Up Visit  Subjective     Marilyn Pineda is a 68 y.o. female who presents for a transitional care management visit.    Within 48 business hours after discharge our office contacted her via telephone to coordinate her care and needs.      I reviewed and discussed the details of that call along with the discharge summary, hospital problems, inpatient lab results, inpatient diagnostic studies, and consultation reports with Marilyn.     Current outpatient and discharge medications have been reconciled for the patient.  Reviewed by: Cinthia Brown MD          10/19/2024     4:58 PM   Date of TCM Phone Call   [facility]   Date of Admission 10/17/2024   Date of Discharge 10/19/2024   Discharge Disposition Home or Self Care     Risk for Readmission (LACE) 7    History of Present Illness   Course During Hospital Stay:      Patient was admitted after findings of a small bowel obstruction in the lower right quadrant were seen on CT scan.  Patient was started on an NG tube and kept strict NPO.  General surgery was consulted to evaluate patient, Dr. Lucero saw patient.   Originally thought of doing an exploratory laparotomy however patient began to have bowel movements the day that he saw her.  Plan for exploratory laparotomy was delayed, Dr. Lucero observed overnight.  Patient continued to have bowel movements and was able to tolerate bowel prep.  GI was consulted and saw patient due to positive Cologuard test.  Patient was doing colonoscopy prep when she experienced the nausea vomiting that brought her to the ED.  Repeat KUB was obtained and GI was notified of resolving SBO.  GI performed colonoscopy yesterday, 10-, under general anesthesia.  Per GI, \"-Partial small bowel obstruction seems improved clinically.  An OG tube was placed during the procedure with no fluid suctioned out.    -Okay to resume full liquid diet and advance as tolerated  -If recurrent bowel " "obstruction in the future this is likely due to stricture more proximal in the ileum, unable to reach with the colonoscope, may need to consider surgical management  -Continue laxatives daily to prevent constipation  -Recall for colonoscopy in 7 years if adenomatous, otherwise 10 years\"    Bowel Obstruction  Recent hospitalization for bowel obstruction.  Felt unwell even after discharge on Friday, 11/08/2024.  Subsequently developed another bowel obstruction, possibly related to bowel prep for colonoscopy.  General surgeon recommended a low-residue diet for two weeks.  Experiencing constipation, which is a known side effect of the diet.  Concerned about low iron levels and fatigue.  Unsure whether to continue the low-residue diet beyond two weeks.  Experiencing constipation despite using stool softeners.  Also concerned about diverticulosis and the role of fiber in the diet.  Denies abdominal discomfort or nausea.  Drinking plenty of water.    Lymph Node Biopsy and Mass  Follow-up appointment with Dr. Ricardo (hematologist) on 11/05/2024 to discuss lymph node biopsy results and a mass, possibly related to radiation.  A lab appointment is scheduled after the appointment, but was not informed of this previously.  White blood cell count decreased after hospital discharge, but red blood cell count also decreased.    Iron Infusion  Considering an iron infusion due to low iron and fatigue.  Will contact oncologist, Dr. Pitt, to discuss the need for an iron infusion and any lab tests needed before the appointment on 11/05/2024.  Will hold off on oral iron supplements until after discussion with oncologist.    Medications and Supplements  Currently taking multivitamins and calcium.  Considering adding vitamin D and K3.  Holding off on iron supplements, B vitamins, and biotin.    Preventative/HCM:  - Diet: Currently on a low-residue diet.  Plan to reintroduce fiber gradually after two weeks, starting 11/16/2024.  - Social " "History:  Not a meat lover, prefers chicken.  Used to eat beans and other high-fiber foods.    Results/Imaging/Labs Reviewed:  - Colonoscopy: Two polyps found, biopsy results pending.  Follow-up appointment with gastroenterologist on 11/21/2024.       The following portions of the patient's history were reviewed and updated as appropriate: allergies, current medications, past family history, past medical history, past social history, past surgical history, and problem list.    Review of Systems   Constitutional:         As noted in HPI, otherwise negative   HENT: Negative.     Respiratory: Negative.     Cardiovascular: Negative.    Gastrointestinal:         As noted in HPI, otherwise negative   Genitourinary: Negative.    Skin: Negative.        Objective   /76   Pulse 56   Resp 16   Ht 162.6 cm (64\")   Wt 83.6 kg (184 lb 4 oz)   SpO2 98%   BMI 31.63 kg/m²   Physical Exam  Vitals reviewed.   Constitutional:       General: She is not in acute distress.     Appearance: Normal appearance.   HENT:      Nose: Nose normal.      Mouth/Throat:      Mouth: Mucous membranes are moist.   Eyes:      Conjunctiva/sclera: Conjunctivae normal.   Cardiovascular:      Rate and Rhythm: Normal rate and regular rhythm.      Heart sounds: Normal heart sounds.   Pulmonary:      Effort: Pulmonary effort is normal.      Breath sounds: Normal breath sounds.   Abdominal:      General: Bowel sounds are normal. There is no distension.      Tenderness: There is no abdominal tenderness. There is no guarding.   Musculoskeletal:      Cervical back: Normal range of motion and neck supple.   Skin:     General: Skin is warm and dry.   Neurological:      Mental Status: She is alert.   Psychiatric:         Mood and Affect: Mood normal.         Behavior: Behavior normal.         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  - Monitor bowel movements and abdominal symptoms.  - Message if bowel issues worsen or do not improve with fiber reintroduction.    Iron Deficiency Anemia   Iron deficiency anemia, likely multifactorial (low dietary intake, recent hospitalization, possible malabsorption).  Fatigue present.  PLAN:  - Contact Dr. Ricardo (hematologist/oncologist) to discuss need for iron infusion and labs scheduled for follow up appointment  - Hold off on oral iron supplements until further discussion.    Reactive Lymph Node and Radiation Induced GI Damage  Lymph node biopsy negative for malignancy.  Mass, possibly related to radiation.  PLAN:  - Follow-up appointment with Dr. Ricardo on 11/05/2024 to discuss results and next steps if indicated    Follow-up with gastroenterologist on 11/21/2024 as scheduled.     TODAY:  - Hold off on oral iron supplements.  - Follow-up appointments with Dr. Ricardo (hematologist/oncologist) on 11/05/2024 and gastroenterologist on 11/21/2024.

## 2024-11-19 ENCOUNTER — TELEPHONE (OUTPATIENT)
Dept: ONCOLOGY | Facility: CLINIC | Age: 69
End: 2024-11-19

## 2024-11-19 ENCOUNTER — TREATMENT (OUTPATIENT)
Dept: PHYSICAL THERAPY | Facility: CLINIC | Age: 69
End: 2024-11-19
Payer: MEDICARE

## 2024-11-19 DIAGNOSIS — M21.41 ACQUIRED PES PLANUS, RIGHT: ICD-10-CM

## 2024-11-19 DIAGNOSIS — M21.861 ACQUIRED POSTERIOR EQUINUS, RIGHT: ICD-10-CM

## 2024-11-19 DIAGNOSIS — M79.89 SWELLING OF LOWER EXTREMITY: ICD-10-CM

## 2024-11-19 DIAGNOSIS — M79.671 RIGHT FOOT PAIN: Primary | ICD-10-CM

## 2024-11-19 DIAGNOSIS — M19.071 ARTHRITIS OF MIDTARSAL JOINT OF RIGHT FOOT: ICD-10-CM

## 2024-11-19 PROCEDURE — 97112 NEUROMUSCULAR REEDUCATION: CPT | Performed by: PHYSICAL THERAPIST

## 2024-11-19 PROCEDURE — 97110 THERAPEUTIC EXERCISES: CPT | Performed by: PHYSICAL THERAPIST

## 2024-11-19 PROCEDURE — 97530 THERAPEUTIC ACTIVITIES: CPT | Performed by: PHYSICAL THERAPIST

## 2024-11-19 NOTE — PROGRESS NOTES
Physical Therapy Daily Treatment Note  Visit: 12        Patient: Marilyn Pineda   : 1955  Diagnosis/ICD-10 Code:  Right foot pain [M79.671]  Referring practitioner: AGNES Kwong DPM  Date of Initial Visit: Type: THERAPY  Noted: 2024  Today's Date: 2024  Patient seen for 12 sessions       Visit Diagnoses:    ICD-10-CM ICD-9-CM   1. Right foot pain  M79.671 729.5   2. 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 Acquired posterior equinus, right  M21.861 736.72   4. Acquired pes planus, right  M21.41 734   5. Swelling of lower extremity  M79.89 729.81       Subjective     Marilyn Pineda reports: no new complaints.     Objective   See Exercise, Manual, and Modality Logs for complete treatment.       Assessment/Plan  Working on dynamic balance this date with pt requiring min A x 1 for safety and avoiding LOB. Pt with improvements noted with static balance. Less UE support required during these activities.     Progress per Plan of Care.         Timed:  Therapeutic Exercise:    15     mins  59002;     Neuromuscular Karina:    15    mins  15176;    Therapeutic Activity:     8     mins  45888;         Timed Treatment:   38   mins   Total Treatment:     38   mins        Olivia Gutierrez PT, DPT  Physical Therapist  PT license: [place] [id number]

## 2024-11-21 ENCOUNTER — OFFICE (OUTPATIENT)
Age: 69
End: 2024-11-21

## 2024-11-21 ENCOUNTER — OFFICE (OUTPATIENT)
Dept: URBAN - METROPOLITAN AREA CLINIC 64 | Facility: CLINIC | Age: 69
End: 2024-11-21

## 2024-11-21 VITALS
WEIGHT: 186 LBS | DIASTOLIC BLOOD PRESSURE: 101 MMHG | DIASTOLIC BLOOD PRESSURE: 100 MMHG | HEART RATE: 77 BPM | WEIGHT: 186 LBS | SYSTOLIC BLOOD PRESSURE: 156 MMHG | DIASTOLIC BLOOD PRESSURE: 101 MMHG | SYSTOLIC BLOOD PRESSURE: 156 MMHG | SYSTOLIC BLOOD PRESSURE: 156 MMHG | SYSTOLIC BLOOD PRESSURE: 156 MMHG | DIASTOLIC BLOOD PRESSURE: 101 MMHG | DIASTOLIC BLOOD PRESSURE: 100 MMHG | SYSTOLIC BLOOD PRESSURE: 156 MMHG | WEIGHT: 186 LBS | HEART RATE: 77 BPM | HEIGHT: 65 IN | DIASTOLIC BLOOD PRESSURE: 101 MMHG | WEIGHT: 186 LBS | DIASTOLIC BLOOD PRESSURE: 100 MMHG | WEIGHT: 186 LBS | HEART RATE: 77 BPM | DIASTOLIC BLOOD PRESSURE: 100 MMHG | DIASTOLIC BLOOD PRESSURE: 100 MMHG | SYSTOLIC BLOOD PRESSURE: 150 MMHG | SYSTOLIC BLOOD PRESSURE: 150 MMHG | SYSTOLIC BLOOD PRESSURE: 150 MMHG | HEIGHT: 65 IN | DIASTOLIC BLOOD PRESSURE: 101 MMHG | DIASTOLIC BLOOD PRESSURE: 100 MMHG | DIASTOLIC BLOOD PRESSURE: 100 MMHG | HEIGHT: 65 IN | WEIGHT: 186 LBS | HEIGHT: 65 IN | SYSTOLIC BLOOD PRESSURE: 150 MMHG | HEIGHT: 65 IN | DIASTOLIC BLOOD PRESSURE: 101 MMHG | SYSTOLIC BLOOD PRESSURE: 150 MMHG | DIASTOLIC BLOOD PRESSURE: 101 MMHG | SYSTOLIC BLOOD PRESSURE: 150 MMHG | HEART RATE: 77 BPM | SYSTOLIC BLOOD PRESSURE: 150 MMHG | HEART RATE: 77 BPM | HEIGHT: 65 IN | WEIGHT: 186 LBS | HEART RATE: 77 BPM | HEART RATE: 77 BPM | SYSTOLIC BLOOD PRESSURE: 156 MMHG | SYSTOLIC BLOOD PRESSURE: 156 MMHG | HEIGHT: 65 IN

## 2024-11-21 DIAGNOSIS — K57.90 DIVERTICULOSIS OF INTESTINE, PART UNSPECIFIED, WITHOUT PERFO: ICD-10-CM

## 2024-11-21 DIAGNOSIS — K59.00 CONSTIPATION, UNSPECIFIED: ICD-10-CM

## 2024-11-21 DIAGNOSIS — Z87.19 PERSONAL HISTORY OF OTHER DISEASES OF THE DIGESTIVE SYSTEM: ICD-10-CM

## 2024-11-21 DIAGNOSIS — Z86.0101 PERSONAL HISTORY OF ADENOMATOUS AND SERRATED COLON POLYPS: ICD-10-CM

## 2024-11-21 PROCEDURE — 99213 OFFICE O/P EST LOW 20 MIN: CPT

## 2024-11-25 ENCOUNTER — TREATMENT (OUTPATIENT)
Dept: PHYSICAL THERAPY | Facility: CLINIC | Age: 69
End: 2024-11-25
Payer: MEDICARE

## 2024-11-25 DIAGNOSIS — M79.89 SWELLING OF LOWER EXTREMITY: ICD-10-CM

## 2024-11-25 DIAGNOSIS — M19.071 ARTHRITIS OF MIDTARSAL JOINT OF RIGHT FOOT: ICD-10-CM

## 2024-11-25 DIAGNOSIS — M79.671 RIGHT FOOT PAIN: Primary | ICD-10-CM

## 2024-11-25 DIAGNOSIS — M21.41 ACQUIRED PES PLANUS, RIGHT: ICD-10-CM

## 2024-11-25 DIAGNOSIS — M21.861 ACQUIRED POSTERIOR EQUINUS, RIGHT: ICD-10-CM

## 2024-11-25 PROCEDURE — 97112 NEUROMUSCULAR REEDUCATION: CPT | Performed by: PHYSICAL THERAPIST

## 2024-11-25 PROCEDURE — 97530 THERAPEUTIC ACTIVITIES: CPT | Performed by: PHYSICAL THERAPIST

## 2024-11-25 PROCEDURE — 97110 THERAPEUTIC EXERCISES: CPT | Performed by: PHYSICAL THERAPIST

## 2024-11-25 NOTE — PROGRESS NOTES
Physical Therapy Progress Note/ Re-Assessment      Patient: Marilyn Pineda   : 1955  Diagnosis/ICD-10 Code:  Right foot pain [M79.671]  Referring practitioner: AGNES Kwong DPM  Date of Initial Visit: 2024  Today's Date: 2024  Patient seen for 13 sessions      Subjective:   Visit Diagnoses:    ICD-10-CM ICD-9-CM   1. Right foot pain  M79.671 729.5   2. 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 Acquired posterior equinus, right  M21.861 736.72   4. Acquired pes planus, right  M21.41 734   5. Swelling of lower extremity  M79.89 729.81       Marilyn Pineda reports: Feels her gait is slowly improving. Has had less pain and feels her ankle is getting stronger. Still has concerns about her balance.   Subjective Questionnaire: LEFS: 70  Clinical Progress: improved  Home Program Compliance: Yes  Treatment has included: therapeutic exercise, neuromuscular re-education, manual therapy, therapeutic activity, and gait training    Subjective Evaluation    Pain  At worst pain rating: 3  Location: R foot       Objective          Observations     Additional Ankle/Foot Observation Details  Pes planus R>L    Active Range of Motion   Left Ankle/Foot   Normal active range of motion    Right Ankle/Foot   Dorsiflexion (ke): 5 degrees   Plantar flexion: 40 degrees   Inversion: 30 degrees   Eversion: 12 degrees     Additional Active Range of Motion Details  * INV measured passively due to inability to actively perform.     Strength/Myotome Testing     Left Ankle/Foot   Normal strength    Right Ankle/Foot   Dorsiflexion: 4-  Plantar flexion: 4  Inversion: 2+  Eversion: 4    Additional Strength Details  Within available ROM    Ambulation     Observational Gait     Additional Observational Gait Details  Trendelenburg       Assessment & Plan       Assessment  Impairments: abnormal gait, abnormal or restricted ROM, impaired balance, impaired physical strength, lacks appropriate home exercise program,  pain with function and safety issue   Functional limitations: walking, uncomfortable because of pain, standing, stooping and unable to perform repetitive tasks   Assessment details: Pt has attended 13 visits in skilled PT. She has had 2 recent hosptilaizations since last authorization with notable regression in R LE strength. This is expected due to recent medial complications. However since the most recent hospitalization, she has regained her progress and improved her R ankle and foot strength. She is still unable to actively perform full ROM with R ankle INV, but PROM has greatly improved. Gait pattern remains impaired due to trendelenburg and balance deficits.  Pt would benefit from skilled PT to address the above findings and improve pt's ease with functional mobility and return to PLOF.    Barriers to therapy: None  Prognosis: good    Goals  Plan Goals: ST.Pt will report reduction in worst pain level to 2/10 in 2 weeks. PROGRESSING  2.Pt will improve AROM of R ankle DF to 5 deg in 2 weeks.MET  3. Pt will demo good tolerance and compliance with HEP in 2 weeks. MET    LT.Pt will be independent with HEP in 6 weeks for self management and prevention of re-occurrence. PROGRESSING  2.Pt will improve strength of R ankle to 4+/5 throughout in 6 weeks.PROGRESSING  3.Pt will improve AROM of R ankle to WFL in 6 weeks.PROGRESSING  4. Pt will ambulate with normalized gait pattern in 6 weeks.PROGRESSING    Plan  Therapy options: will be seen for skilled therapy services  Planned modality interventions: cryotherapy and thermotherapy (hydrocollator packs)  Planned therapy interventions: flexibility, functional ROM exercises, gait training, home exercise program, joint mobilization, neuromuscular re-education, manual therapy, strengthening, stretching and therapeutic activities  Frequency: 2x week  Duration in weeks: 6  Treatment plan discussed with: patient    Progress toward previous goals: Partially Met         Recommendations: Continue as planned    Timed:  Therapeutic Exercise:    15     mins  46471;     Neuromuscular Karina:    15    mins  66908;    Therapeutic Activity:     10     mins  35726;       Timed Treatment:   40   mins   Total Treatment:     40   mins    PT Signature: Olivia Gutierrez PT, DPT  PT license: [place] [id number]                         Ann-Marie Dunn

## 2024-11-26 ENCOUNTER — TELEPHONE (OUTPATIENT)
Dept: ONCOLOGY | Facility: CLINIC | Age: 69
End: 2024-11-26

## 2024-11-26 NOTE — TELEPHONE ENCOUNTER
Caller: Edwin Marilyn S    Relationship: Self    Best call back number: [phone]    What is the best time to reach you: ANY    Who are you requesting to speak with (clinical staff, provider,  specific staff member): SCHEDULING     What was the call regarding: MARILYN IS HAVING A MRI AT [facility] ON 12-6    SHE IS WANTING TO SET UP A VIDEO VISIT TO GO OVER RESULTS      PLEASE ADVISE

## 2024-12-02 ENCOUNTER — TELEPHONE (OUTPATIENT)
Dept: PHYSICAL THERAPY | Facility: CLINIC | Age: 69
End: 2024-12-02

## 2024-12-02 NOTE — TELEPHONE ENCOUNTER
Caller: Marilyn Pineda    Relationship: Self       What was the call regarding: NOT FEELING WELL

## 2024-12-09 ENCOUNTER — TREATMENT (OUTPATIENT)
Dept: PHYSICAL THERAPY | Facility: CLINIC | Age: 69
End: 2024-12-09
Payer: MEDICARE

## 2024-12-09 DIAGNOSIS — M79.671 RIGHT FOOT PAIN: Primary | ICD-10-CM

## 2024-12-09 DIAGNOSIS — M79.89 SWELLING OF LOWER EXTREMITY: ICD-10-CM

## 2024-12-09 DIAGNOSIS — M19.071 ARTHRITIS OF MIDTARSAL JOINT OF RIGHT FOOT: ICD-10-CM

## 2024-12-09 DIAGNOSIS — M21.41 ACQUIRED PES PLANUS, RIGHT: ICD-10-CM

## 2024-12-09 DIAGNOSIS — M21.861 ACQUIRED POSTERIOR EQUINUS, RIGHT: ICD-10-CM

## 2024-12-09 PROCEDURE — 97110 THERAPEUTIC EXERCISES: CPT | Performed by: PHYSICAL THERAPIST

## 2024-12-09 PROCEDURE — 97112 NEUROMUSCULAR REEDUCATION: CPT | Performed by: PHYSICAL THERAPIST

## 2024-12-09 NOTE — PROGRESS NOTES
Physical Therapy Daily Treatment Note  Visit: 14        Patient: Marilyn Pineda   : 1955  Diagnosis/ICD-10 Code:  Right foot pain [M79.671]  Referring practitioner: AGNES Kwong DPM  Date of Initial Visit: Type: THERAPY  Noted: 2024  Today's Date: 2024  Patient seen for 14 sessions       Visit Diagnoses:    ICD-10-CM ICD-9-CM   1. Right foot pain  M79.671 729.5   2. 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 Acquired posterior equinus, right  M21.861 736.72   4. Acquired pes planus, right  M21.41 734   5. Swelling of lower extremity  M79.89 729.81       Subjective     Marilyn Pineda reports: no new complaints.    Objective   See Exercise, Manual, and Modality Logs for complete treatment.       Assessment/Plan  Progressed balance and LE strengthening exercises today with good tolerance. Improving stability with balance activities.     Progress per Plan of Care.         Timed:  Therapeutic Exercise:    15     mins  70670;     Neuromuscular Karina:    25    mins  13486;        Timed Treatment:   40   mins   Total Treatment:     40   mins        Olivia Gutierrez PT, DPT  Physical Therapist  PT license: [place] [id number]

## 2024-12-17 ENCOUNTER — TREATMENT (OUTPATIENT)
Dept: PHYSICAL THERAPY | Facility: CLINIC | Age: 69
End: 2024-12-17
Payer: MEDICARE

## 2024-12-17 DIAGNOSIS — M79.89 SWELLING OF LOWER EXTREMITY: ICD-10-CM

## 2024-12-17 DIAGNOSIS — M21.861 ACQUIRED POSTERIOR EQUINUS, RIGHT: ICD-10-CM

## 2024-12-17 DIAGNOSIS — M19.071 ARTHRITIS OF MIDTARSAL JOINT OF RIGHT FOOT: ICD-10-CM

## 2024-12-17 DIAGNOSIS — M79.671 RIGHT FOOT PAIN: Primary | ICD-10-CM

## 2024-12-17 DIAGNOSIS — M21.41 ACQUIRED PES PLANUS, RIGHT: ICD-10-CM

## 2024-12-18 NOTE — PROGRESS NOTES
Physical Therapy Daily Treatment Note  Visit: 15        Patient: Marilyn Pineda   : 1955  Diagnosis/ICD-10 Code:  Right foot pain [M79.671]  Referring practitioner: AGNES Kwong DPM  Date of Initial Visit: Type: THERAPY  Noted: 2024  Today's Date: 2024  Patient seen for 15 sessions       Visit Diagnoses:    ICD-10-CM ICD-9-CM   1. Right foot pain  M79.671 729.5   2. 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 Acquired posterior equinus, right  M21.861 736.72   4. Acquired pes planus, right  M21.41 734   5. Swelling of lower extremity  M79.89 729.81       Subjective     Marilyn Pineda reports: no pain today.     Objective   See Exercise, Manual, and Modality Logs for complete treatment.       Assessment/Plan  Cont to progress balance training and LE strengthening. Pt demo trendelenburg gait pattern due to hip weakness, which this was progressed today. Pt does demo improving balance and stability with standing activities.     Progress per Plan of Care.         Timed:  Therapeutic Exercise:    15     mins  61881;     Neuromuscular Karina:    25    mins  75274;        Timed Treatment:   40   mins   Total Treatment:     40   mins        Olivia Gutierrez PT, DPT  Physical Therapist  PT license: [place] [id number]

## 2025-01-13 ENCOUNTER — TREATMENT (OUTPATIENT)
Dept: PHYSICAL THERAPY | Facility: CLINIC | Age: 70
End: 2025-01-13
Payer: MEDICARE

## 2025-01-13 DIAGNOSIS — M21.861 ACQUIRED POSTERIOR EQUINUS, RIGHT: ICD-10-CM

## 2025-01-13 DIAGNOSIS — M79.89 SWELLING OF LOWER EXTREMITY: ICD-10-CM

## 2025-01-13 DIAGNOSIS — M79.671 RIGHT FOOT PAIN: Primary | ICD-10-CM

## 2025-01-13 DIAGNOSIS — M21.41 ACQUIRED PES PLANUS, RIGHT: ICD-10-CM

## 2025-01-13 DIAGNOSIS — M19.071 ARTHRITIS OF MIDTARSAL JOINT OF RIGHT FOOT: ICD-10-CM

## 2025-01-13 PROCEDURE — 97110 THERAPEUTIC EXERCISES: CPT | Performed by: PHYSICAL THERAPIST

## 2025-01-13 PROCEDURE — 97112 NEUROMUSCULAR REEDUCATION: CPT | Performed by: PHYSICAL THERAPIST

## 2025-01-13 PROCEDURE — 97164 PT RE-EVAL EST PLAN CARE: CPT | Performed by: PHYSICAL THERAPIST

## 2025-01-13 NOTE — PROGRESS NOTES
Physical Therapy Re-Certification of Plan of Care        Patient: Marilyn Pineda   : 1955  Diagnosis/ICD-10 Code:  Right foot pain [M79.671]  Referring practitioner: AGNES Kwong DPM  Date of Initial Visit: 2025  Today's Date: 2025  Patient seen for 16 sessions      Subjective:   Visit Diagnoses:    ICD-10-CM ICD-9-CM   1. Right foot pain  M79.671 729.5   2. 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 Acquired posterior equinus, right  M21.861 736.72   4. Acquired pes planus, right  M21.41 734   5. Swelling of lower extremity  M79.89 729.81       Marilyn Pineda reports: having to help her  shovel snow caused some increase in R foot pain as well as B knee pain. However, feels she is able to move her R ankle more than previously.   Subjective Questionnaire: FAAM: 77.38  Clinical Progress: improved  Home Program Compliance: Yes  Treatment has included: therapeutic exercise, neuromuscular re-education, manual therapy, therapeutic activity, and gait training    Subjective   Objective          Observations     Additional Ankle/Foot Observation Details  Pes planus R>L    Active Range of Motion   Left Ankle/Foot   Normal active range of motion    Right Ankle/Foot   Dorsiflexion (ke): 5 degrees   Plantar flexion: 45 degrees   Inversion: 20 degrees   Eversion: 12 degrees     Joint Play     Right Ankle/Foot  Hypomobile in the talocrural joint and subtalar joint.     Strength/Myotome Testing     Left Ankle/Foot   Normal strength    Right Ankle/Foot   Dorsiflexion: 3  Plantar flexion: 4-  Inversion: 2+  Eversion: 4-    Additional Strength Details  Within available ROM    Ambulation     Observational Gait     Additional Observational Gait Details  Trendelenburg       Assessment & Plan       Assessment  Impairments: abnormal gait, abnormal or restricted ROM, impaired balance, impaired physical strength, lacks appropriate home exercise program, pain with function and safety issue    Functional limitations: walking, uncomfortable because of pain, standing, stooping and unable to perform repetitive tasks   Assessment details: Pt has attended 16 visits in skilled PT. She is making good progress towards all goals, but cont to demo significant weakness in R LE. Unable to perform inversion against gravity at this time, but PROM is near normal limits. Balance remains significantly impaired which increases her risk for falls. Pt would benefit from skilled PT to address the above findings and improve pt's ease with functional mobility and return to PLOF.    Barriers to therapy: None  Prognosis: good    Goals  Plan Goals: ST.Pt will report reduction in worst pain level to 2/10 in 2 weeks. MET  2.Pt will improve AROM of R ankle DF to 5 deg in 2 weeks. MET  3. Pt will demo good tolerance and compliance with HEP in 2 weeks. MET    LT.Pt will be independent with HEP in 6 weeks for self management and prevention of re-occurrence. PROGRESSING  2.Pt will improve strength of R ankle to 4+/5 throughout in 6 weeks.PROGRESSING  3.Pt will improve AROM of R ankle to WFL in 6 weeks.PROGRESSING  4. Pt will ambulate with normalized gait pattern in 6 weeks.PROGRESSING    Plan  Therapy options: will be seen for skilled therapy services  Planned modality interventions: cryotherapy, electrical stimulation/Russian stimulation, thermotherapy (hydrocollator packs) and ultrasound  Planned therapy interventions: flexibility, functional ROM exercises, gait training, home exercise program, joint mobilization, neuromuscular re-education, manual therapy, strengthening, stretching and therapeutic activities  Frequency: 2x week  Duration in weeks: 6  Treatment plan discussed with: patient    Progress toward previous goals: Partially Met        Recommendations: Continue as planned      Based upon review of the patient's progress and continued therapy plan, it is my medical opinion that Marilyn Pineda should continue physical  therapy treatment at [facility]  [address] [place] [address] [phone].    Timed:  Therapeutic Exercise:    15     mins  28507;     Neuromuscular Karina:    23    mins  79468;      Untimed:  Re-Eval      8      mins  94921    Timed Treatment:   38   mins   Total Treatment:     46   mins      PT Signature: Olivia Gutierrez PT, DPT  PT license: [place] [id number]       Certification Period: 4/12/2025  I certify that the therapy services are furnished while this patient is under my care.  The services outlined above are required by this patient, and will be reviewed every 90 days.    Physician Signature: _________________________________________    PHYSICIAN: AGNES Kwong DPM  DATE:

## 2025-01-20 ENCOUNTER — TREATMENT (OUTPATIENT)
Dept: PHYSICAL THERAPY | Facility: CLINIC | Age: 70
End: 2025-01-20
Payer: MEDICARE

## 2025-01-20 DIAGNOSIS — M79.89 SWELLING OF LOWER EXTREMITY: ICD-10-CM

## 2025-01-20 DIAGNOSIS — M21.861 ACQUIRED POSTERIOR EQUINUS, RIGHT: ICD-10-CM

## 2025-01-20 DIAGNOSIS — M79.671 RIGHT FOOT PAIN: Primary | ICD-10-CM

## 2025-01-20 DIAGNOSIS — M19.071 ARTHRITIS OF MIDTARSAL JOINT OF RIGHT FOOT: ICD-10-CM

## 2025-01-20 DIAGNOSIS — M21.41 ACQUIRED PES PLANUS, RIGHT: ICD-10-CM

## 2025-01-20 PROCEDURE — 97110 THERAPEUTIC EXERCISES: CPT | Performed by: PHYSICAL THERAPIST

## 2025-01-20 PROCEDURE — 97112 NEUROMUSCULAR REEDUCATION: CPT | Performed by: PHYSICAL THERAPIST

## 2025-01-20 NOTE — PROGRESS NOTES
Physical Therapy Daily Treatment Note  Visit: 17        Patient: Marilyn Pineda   : 1955  Diagnosis/ICD-10 Code:  Right foot pain [M79.671]  Referring practitioner: AGNES Kwong DPM  Date of Initial Visit: Type: THERAPY  Noted: 2024  Today's Date: 2025  Patient seen for 17 sessions       Visit Diagnoses:    ICD-10-CM ICD-9-CM   1. Right foot pain  M79.671 729.5   2. 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 Acquired posterior equinus, right  M21.861 736.72   4. Acquired pes planus, right  M21.41 734   5. Swelling of lower extremity  M79.89 729.81       Subjective     Marilyn Pineda reports: feels like her foot is improving and moving more than previous visits.     Objective   See Exercise, Manual, and Modality Logs for complete treatment.       Assessment/Plan  Working on general LE strengthening and balance training. Excellent effort and tolerance. Progressing towards all goals.     Progress per Plan of Care.         Timed:  Therapeutic Exercise:    15     mins  34949;     Neuromuscular Karina:    23    mins  95592;      Timed Treatment:   38   mins   Total Treatment:     38   mins        Olivia Gutierrez PT, DPT  Physical Therapist  PT license: [place] [id number]

## 2025-01-29 ENCOUNTER — TREATMENT (OUTPATIENT)
Dept: PHYSICAL THERAPY | Facility: CLINIC | Age: 70
End: 2025-01-29
Payer: MEDICARE

## 2025-01-29 DIAGNOSIS — M21.861 ACQUIRED POSTERIOR EQUINUS, RIGHT: ICD-10-CM

## 2025-01-29 DIAGNOSIS — M19.071 ARTHRITIS OF MIDTARSAL JOINT OF RIGHT FOOT: ICD-10-CM

## 2025-01-29 DIAGNOSIS — M79.671 RIGHT FOOT PAIN: Primary | ICD-10-CM

## 2025-01-29 DIAGNOSIS — M79.89 SWELLING OF LOWER EXTREMITY: ICD-10-CM

## 2025-01-29 DIAGNOSIS — M21.41 ACQUIRED PES PLANUS, RIGHT: ICD-10-CM

## 2025-01-29 NOTE — PROGRESS NOTES
Physical Therapy Daily Treatment Note  Visit: 18        Patient: Marilyn Pineda   : 1955  Diagnosis/ICD-10 Code:  Right foot pain [M79.671]  Referring practitioner: AGNES Kwong DPM  Date of Initial Visit: Type: THERAPY  Noted: 2024  Today's Date: 2025  Patient seen for 18 sessions       Visit Diagnoses:    ICD-10-CM ICD-9-CM   1. Right foot pain  M79.671 729.5   2. 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 Acquired posterior equinus, right  M21.861 736.72   4. Acquired pes planus, right  M21.41 734   5. Swelling of lower extremity  M79.89 729.81       Subjective     Marilyn Pineda reports: feeling stronger every day.     Objective   See Exercise, Manual, and Modality Logs for complete treatment.       Assessment/Plan  Excellent tolerance to strengthening and balance activities performed last visit with good caryr over noted today. Progressing resistance as tolerated.     Progress per Plan of Care.         Timed:  Therapeutic Exercise:    15     mins  89677;     Neuromuscular Karina:    23    mins  96793;        Timed Treatment:   38   mins   Total Treatment:     38   mins        Olivia Gutierrez PT, DPT  Physical Therapist  PT license: [place] [id number]

## 2025-02-04 NOTE — PROGRESS NOTES
HEMATOLOGY ONCOLOGY HOSPITAL FOLLOW UP       Patient name: Marilyn Pineda  : 1955  MRN: [id number]  Primary Care Physician: Cinthia Brown MD  Referring Physician: Cinthia Brown*      History of Present Illness:    Marilyn Pineda is a 69 y.o.  female who presented to [facility] on 10/6/2024 with complaints of nausea and vomiting, diarrhea for several days. She denies fever or chills.      10/6/2024  CT abdomen pelvis without contrast  Impression:  1.Abnormal soft tissue density centered in the right hemipelvis, nonspecific in appearance but suspected to largely represent fibrosis and/or post XRT changes. Other possibilities such as underlying neoplasm or infection are considered less likely,   however, not definitively excluded.  2.Partial versus early complete small bowel obstruction in this region.  3.Apparent wall thickening of the cecum favored to be secondary to post XRT change although superimposed active inflammation or neoplasm cannot be definitively excluded.  4.Obstruction of the distal right ureter by the above process with resulting severe right hydronephrosis and hydroureter. Severe right renal parenchymal atrophy.  5.Previous hysterectomy  6.Mesentery and right inguinal adenopathy in the lower abdomen and pelvis could be inflammatory or neoplastic in etiology.  7.Multiple vascular collaterals noted in the anterior pelvic wall suggesting some degree of obstruction of the right external and internal iliac artery and veins. Evaluation is limited by the lack of IV contrast.        10/6/2024 CT chest abdomen pelvis with contrast  IMPRESSION:  Chest-  No evidence of metastatic disease. No acute process demonstrated     Abdomen/pelvis-  1. There is ill-defined soft tissue in the right side of the pelvis. Finding could represent treatment related changes and fibrosis. Tumor not excluded, in comparison with any outside prior imaging would be helpful with this regard. The  abnormality   results in right ureteral obstruction which may be longstanding given the presence of cortical atrophy, and right iliac vein obstruction which is associated with collateral formation.  2. Distal small bowel obstruction with transition at the terminal ileum. Thickening of the terminal ileum may relate to radiation enteropathy or fibrosis. This may also account for thickening of the base of the cecum. Wall thickening of the urinary   bladder suggests radiation cystitis  3. Abnormal appearance of the right SI joint and adjacent bones. 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 The presence of infection of the SI joint is not excluded  4. Thickening of the right iliopsoas muscle may be treatment related or secondary to adjacent SI joint infection if present  5. 3 mm indeterminate liver lesion, likely cyst. Follow-up recommended  6. Mild mesenteric adenopathy, and enlarged right groin lymph nodes     10/6/2024 CT head w/wo contrast  Impression: No acute intracranial pathology. No abnormal intracranial enhancement.      Urology has been consulted. They believe hydronephrosis is chronic and unlikely to be the source of her nausea.  GI and general surgery have also been consulted.     10/07/24  Hematology/Oncology was consulted.  Patient reports a remote history of cervical cancer diagnosed in 1993 status post hysterectomy.  Of note, she did have a positive Cologuard screening in June 2024 and is scheduled for outpatient colonoscopy.     10/7/24: patient seen today for initial evaluation. Noted to be comfortable. Denied any acute complaints. No abdominal pain at this time. Nausea/vomiting has improved.     10/8/24: Right inguinal LN Biopsy:  Pathology: Scant benign reactive lymphoid tissue. No malignancy identified  Flow: No significant lymphoid immunophenotypic abnormalities (limited sample)      10/17/24-10/19/24: Interval Hospitalization update:  Patient was doing colonoscopy prep when she  "experienced the nausea vomiting that brought her to the ED.  Repeat KUB was obtained and GI was notified of resolving SBO.  GI performed colonoscopy on 10-, under general anesthesia.  Per GI, \"-Partial small bowel obstruction seems improved clinically.  An OG tube was placed during the procedure with no fluid suctioned out.  Status post colonoscopy, pt was able to move her bowels and was discharged after she was noted to have good tolerance to food.    24: Patient seen today for follow-up after her hospitalization as above to discuss biopsy findings.  She was again hospitalized after her discharge as above for recurrent small bowel obstruction which resolved with conservative management.  She is clinically stable at present and denied any GI symptoms.     Subjective:  2025: Patient seen today for routine follow-up.  She reports overall doing well and has no acute complaints today.  She is clinically stable and denies having any GI symptoms.  She did not proceed with pelvic MRI would like to continue to hold off on imaging at this time, particularly since she is having no new symptoms.  She continues to follow with gastroenterology.    Past Medical History:   Diagnosis Date    Allergic     amoxicilin clayvix    Anemia     Arthritis 2024    In right  foot    Cancer 1993    cervial cancer/ radiation and had hysterectomy    Colon polyp 10-18-24    Pain of right hip joint 2018    Right lower lobe pneumonia 2018    Small bowel obstruction 10/2024    Thrush, oral 2018       Past Surgical History:   Procedure Laterality Date     SECTION  1984    COLONOSCOPY N/A 10/18/2024    Procedure: COLONOSCOPY WITH POLYPECTOMY X2;  Surgeon: NADEEM Aggarwal MD;  Location: [facility] ENDOSCOPY;  Service: Gastroenterology;  Laterality: N/A;  POST: POLYPS    HAND SURGERY      HYSTERECTOMY      LYMPH NODE BIOPSY  10-8-24    US GUIDED LYMPH NODE BIOPSY  10/08/2024         Current Outpatient " Medications:     cyanocobalamin (VITAMIN B-12) 2500 MCG tablet tablet, Take 500 mcg by mouth Daily., Disp: , Rfl:     Misc Natural Products (BEET ROOT PO), Take 500 mg by mouth Daily., Disp: , Rfl:     Multiple Minerals-Vitamins (Calcium-Magnesium-Zinc-D3) tablet, Take 1 tablet by mouth Daily., Disp: , Rfl:     multivitamin with minerals (MULTIVITAMIN WOMEN PO), Take 1 tablet by mouth Daily., Disp: , Rfl:     BIOTIN PO, Take 1 tablet by mouth Daily. (Patient not taking: Reported on 10/28/2024), Disp: , Rfl:     Ferrous Sulfate (IRON PO), Take 65 mg by mouth Every Other Day. (Patient not taking: Reported on 2/5/2025), Disp: , Rfl:     vitamin B-6 (PYRIDOXINE) 50 MG tablet, Take 2 tablets by mouth Daily. (Patient not taking: Reported on 10/28/2024), Disp: , Rfl:     Allergies   Allergen Reactions    Augmentin [Amoxicillin-Pot Clavulanate] Hives       Family History   Problem Relation Age of Onset    COPD Mother         from smoking for years    Miscarriages / Stillbirths Mother         had a miscarriage    Thyroid disease Father         is taking thyroid pil;    Cancer Maternal Grandmother         had lung cancer    Diabetes Brother        Cancer-related family history includes Cancer in her maternal grandmother.    Social History     Tobacco Use    Smoking status: Never    Smokeless tobacco: Never   Vaping Use    Vaping status: Never Used   Substance Use Topics    Alcohol use: Not Currently     Comment: may drink a little wine but only very occasionally    Drug use: No     Social History     Social History Narrative    Not on file          ROS:   Review of Systems   Constitutional: Negative.    HENT: Negative.     Eyes: Negative.    Respiratory: Negative.     Cardiovascular: Negative.    Gastrointestinal: Negative.    Endocrine: Negative.    Genitourinary: Negative.    Musculoskeletal: Negative.    Skin: Negative.    Allergic/Immunologic: Negative.    Neurological: Negative.    Hematological: Negative.   "  Psychiatric/Behavioral: Negative.         Objective:  Vital signs:  Vitals:    02/05/25 1026   BP: (!) 169/105   Pulse: 68   Temp: 98.7 °F (37.1 °C)   SpO2: 100%   Weight: 87.1 kg (192 lb)   Height: 162.6 cm (64.02\")   PainSc: 0-No pain       Body mass index is 32.94 kg/m².  ECOG  (0) Fully active, able to carry on all predisease performance without restriction    Physical Exam:   Physical Exam  Vitals reviewed.   Constitutional:       Appearance: Normal appearance.   HENT:      Head: Normocephalic and atraumatic.   Eyes:      General: No scleral icterus.     Pupils: Pupils are equal, round, and reactive to light.   Cardiovascular:      Rate and Rhythm: Normal rate and regular rhythm.      Pulses: Normal pulses.      Heart sounds: No murmur heard.  Pulmonary:      Effort: Pulmonary effort is normal.      Breath sounds: Normal breath sounds.   Abdominal:      General: There is no distension.      Palpations: Abdomen is soft. There is no mass.      Tenderness: There is no abdominal tenderness.   Musculoskeletal:         General: Normal range of motion.      Cervical back: Normal range of motion and neck supple.   Skin:     General: Skin is warm.      Coloration: Skin is not jaundiced.      Findings: No bruising, erythema, lesion or rash.   Neurological:      General: No focal deficit present.      Mental Status: She is alert and oriented to person, place, and time.   Psychiatric:         Mood and Affect: Mood normal.         Behavior: Behavior normal.         Thought Content: Thought content normal.         Judgment: Judgment normal.         Lab Results - Last 18 Months   Lab Units 02/05/25  1021 11/05/24  1252 10/19/24  0009   WBC 10*3/mm3 8.52 7.77 9.90   HEMOGLOBIN g/dL 11.2* 10.2* 9.3*   HEMATOCRIT % 37.6 33.7* 29.6*   PLATELETS 10*3/mm3 318 309 380   MCV fL 89.5 90.3 85.1     Lab Results - Last 18 Months   Lab Units 10/19/24  0009 10/18/24  0405 10/17/24  1049 10/07/24  0040 10/06/24  0937 10/01/24  1317 " "  SODIUM mmol/L 135* 136 136   < > 138 140   POTASSIUM mmol/L 3.6 3.3* 3.3*   < > 4.0 4.5   CHLORIDE mmol/L 101 99 92*   < > 95* 101   CO2 mmol/L 26.8 26.6 27.6   < > 27.6 28.0   BUN mg/dL 19 21 18   < > 27* 16   CREATININE mg/dL 1.04* 1.72* 1.61*   < > 1.04* 1.03*   CALCIUM mg/dL 8.4* 8.5* 9.9   < > 9.4 9.5   BILIRUBIN mg/dL  --   --  0.5  --  0.6 0.4   ALK PHOS U/L  --   --  92  --  87 82   ALT (SGPT) U/L  --   --  18  --  9 12   AST (SGOT) U/L  --   --  20  --  19 16   GLUCOSE mg/dL 149* 131* 138*   < > 158* 92    < > = values in this interval not displayed.       Lab Results   Component Value Date    GLUCOSE 149 (H) 10/19/2024    BUN 19 10/19/2024    CREATININE 1.04 (H) 10/19/2024    BCR 18.3 10/19/2024    K 3.6 10/19/2024    CO2 26.8 10/19/2024    CALCIUM 8.4 (L) 10/19/2024    ALBUMIN 4.6 10/17/2024    LABIL2 1.1 09/10/2018    AST 20 10/17/2024    ALT 18 10/17/2024       No results for input(s): \"APTT\", \"INR\", \"PTT\" in the last 02294 hours.    Lab Results   Component Value Date    IRON 17 (L) 11/05/2024    TIBC 234 (L) 11/05/2024    FERRITIN 397.00 (H) 11/05/2024       No results found for: \"FOLATE\"    No results found for: \"OCCULTBLD\"    Lab Results   Component Value Date    RETICCTPCT 1.75 11/05/2024     Lab Results   Component Value Date    SSPQRHQS62 981 (H) 05/24/2024     No results found for: \"SPEP\", \"UPEP\"  Uric Acid   Date Value Ref Range Status   02/20/2018 4.9 2.6 - 8.0 mg/dL Final     No results found for: \"TREMAYNE\", \"RF\", \"SEDRATE\"  Lab Results   Component Value Date    HAPTOGLOBIN 582 (H) 01/22/2018     No results found for: \"PTT\", \"INR\"  Lab Results   Component Value Date     22.2 11/05/2024     No results found for: \"CEA\"  No components found for: \"CA-19-9\"  No results found for: \"PSA\"  No results found for: \"SEDRATE\"      No radiology results for the last 30 days.    Assessment & Plan     Abnormal soft tissue density in right hemipelvis: Suspicious for malignancy  Mesentery and right inguinal " adenopathy  -imaging findings were reviewed as above. The overall picture is concerning for malignancy vs infectious/inflammatory etiology. Less likely to be sequela of prior radiation given long time having passed since XRT (30 years).   -She has right groin lymphadenopathy, this was biopsied on 10/8/24.  This has been reported negative.  -CT abdomen pelvis on 10/17/2024 again reported ill-defined thickening in the right hemipelvis retroperitoneum extending along the pelvic sidewall and continues to the lack muscle, thought to be radiation-induced fibrosis but unclear etiology.  -Will obtain MRI pelvis to further evaluate these findings.  Patient has declined to proceed with pelvic imaging at this time.  - levels reported normal       Partial small bowel obstruction  -Patient was discharged after conservative management of bowel obstruction, however she had a recurrent episode for which she was hospitalized again on 10/15 as above.  Resolved after supportive care without surgical intervention.  -Symptoms have improved.  -She continues to follow with gastroenterology     Normocytic anemia  -Previously noted leukocytosis and thrombocytosis has improved  -Noted to have improvement in anemia, hemoglobin improved to 10.2 today.  MCV 90.3  -Iron panel previously completed and not consistent with IRISH, but more likely to be Anemia of chronic disease  -She is currently off of oral iron supplementation.  -Continue to monitor CBC recheck iron profile today.     Personal history of cervical cancer diagnosed in 1990s  -Patient is status post hysterectomy and radiation treatment  -low concern for recurrence of  primary at this time.  -urology evaluated the pt. No further interventions recommended.    Elevated Blood pressure  -Patient reports no new symptoms.  Encouraged monitoring of blood pressure at home and follow-up with PCP.  Discussed red flag symptoms that warrant prompt medical evaluation      Follow-up with   Ricardo in 3 months, sooner if condition indicates    Electronically signed by Mary Messina PA-C      Thank you very much for providing the opportunity to participate in this patient’s care. Please do not hesitate to call if there are any other questions.

## 2025-02-05 ENCOUNTER — OFFICE VISIT (OUTPATIENT)
Dept: ONCOLOGY | Facility: CLINIC | Age: 70
End: 2025-02-05
Payer: MEDICARE

## 2025-02-05 ENCOUNTER — LAB (OUTPATIENT)
Dept: LAB | Facility: HOSPITAL | Age: 70
End: 2025-02-05
Payer: MEDICARE

## 2025-02-05 VITALS
SYSTOLIC BLOOD PRESSURE: 169 MMHG | HEART RATE: 68 BPM | TEMPERATURE: 98.7 F | BODY MASS INDEX: 32.78 KG/M2 | WEIGHT: 192 LBS | OXYGEN SATURATION: 100 % | DIASTOLIC BLOOD PRESSURE: 105 MMHG | HEIGHT: 64 IN

## 2025-02-05 DIAGNOSIS — D64.9 ANEMIA, UNSPECIFIED TYPE: Primary | ICD-10-CM

## 2025-02-05 DIAGNOSIS — D39.8: ICD-10-CM

## 2025-02-05 DIAGNOSIS — R19.00 PELVIC MASS: ICD-10-CM

## 2025-02-05 DIAGNOSIS — D39.8: Primary | ICD-10-CM

## 2025-02-05 LAB
ALBUMIN SERPL-MCNC: 4.1 G/DL (ref 3.5–5.2)
ALBUMIN/GLOB SERPL: 1.2 G/DL
ALP SERPL-CCNC: 85 U/L (ref 39–117)
ALT SERPL W P-5'-P-CCNC: 16 U/L (ref 1–33)
ANION GAP SERPL CALCULATED.3IONS-SCNC: 8.6 MMOL/L (ref 5–15)
AST SERPL-CCNC: 18 U/L (ref 1–32)
BASOPHILS # BLD AUTO: 0.04 10*3/MM3 (ref 0–0.2)
BASOPHILS NFR BLD AUTO: 0.5 % (ref 0–1.5)
BILIRUB SERPL-MCNC: 0.4 MG/DL (ref 0–1.2)
BUN SERPL-MCNC: 20 MG/DL (ref 8–23)
BUN/CREAT SERPL: 23.8 (ref 7–25)
CALCIUM SPEC-SCNC: 9.5 MG/DL (ref 8.6–10.5)
CHLORIDE SERPL-SCNC: 102 MMOL/L (ref 98–107)
CO2 SERPL-SCNC: 29.4 MMOL/L (ref 22–29)
CREAT SERPL-MCNC: 0.84 MG/DL (ref 0.57–1)
DEPRECATED RDW RBC AUTO: 49.3 FL (ref 37–54)
EGFRCR SERPLBLD CKD-EPI 2021: 75.8 ML/MIN/1.73
EOSINOPHIL # BLD AUTO: 0.31 10*3/MM3 (ref 0–0.4)
EOSINOPHIL NFR BLD AUTO: 3.6 % (ref 0.3–6.2)
ERYTHROCYTE [DISTWIDTH] IN BLOOD BY AUTOMATED COUNT: 15.3 % (ref 12.3–15.4)
FERRITIN SERPL-MCNC: 317 NG/ML (ref 13–150)
GLOBULIN UR ELPH-MCNC: 3.3 GM/DL
GLUCOSE SERPL-MCNC: 83 MG/DL (ref 65–99)
HCT VFR BLD AUTO: 37.6 % (ref 34–46.6)
HGB BLD-MCNC: 11.2 G/DL (ref 12–15.9)
HOLD SPECIMEN: NORMAL
IRON 24H UR-MRATE: 36 MCG/DL (ref 37–145)
IRON SATN MFR SERPL: 14 % (ref 20–50)
LYMPHOCYTES # BLD AUTO: 1.3 10*3/MM3 (ref 0.7–3.1)
LYMPHOCYTES NFR BLD AUTO: 15.3 % (ref 19.6–45.3)
MCH RBC QN AUTO: 26.7 PG (ref 26.6–33)
MCHC RBC AUTO-ENTMCNC: 29.8 G/DL (ref 31.5–35.7)
MCV RBC AUTO: 89.5 FL (ref 79–97)
MONOCYTES # BLD AUTO: 0.56 10*3/MM3 (ref 0.1–0.9)
MONOCYTES NFR BLD AUTO: 6.6 % (ref 5–12)
NEUTROPHILS NFR BLD AUTO: 6.31 10*3/MM3 (ref 1.7–7)
NEUTROPHILS NFR BLD AUTO: 74 % (ref 42.7–76)
PLATELET # BLD AUTO: 318 10*3/MM3 (ref 140–450)
PMV BLD AUTO: 8.6 FL (ref 6–12)
POTASSIUM SERPL-SCNC: 4.4 MMOL/L (ref 3.5–5.2)
PROT SERPL-MCNC: 7.4 G/DL (ref 6–8.5)
RBC # BLD AUTO: 4.2 10*6/MM3 (ref 3.77–5.28)
RETICS # AUTO: 0.06 10*6/MM3 (ref 0.02–0.13)
RETICS/RBC NFR AUTO: 1.37 % (ref 0.7–1.9)
SODIUM SERPL-SCNC: 140 MMOL/L (ref 136–145)
TIBC SERPL-MCNC: 256 MCG/DL (ref 298–536)
TRANSFERRIN SERPL-MCNC: 172 MG/DL (ref 200–360)
WBC NRBC COR # BLD AUTO: 8.52 10*3/MM3 (ref 3.4–10.8)

## 2025-02-05 PROCEDURE — 83540 ASSAY OF IRON: CPT | Performed by: STUDENT IN AN ORGANIZED HEALTH CARE EDUCATION/TRAINING PROGRAM

## 2025-02-05 PROCEDURE — 36415 COLL VENOUS BLD VENIPUNCTURE: CPT

## 2025-02-05 PROCEDURE — 82728 ASSAY OF FERRITIN: CPT | Performed by: STUDENT IN AN ORGANIZED HEALTH CARE EDUCATION/TRAINING PROGRAM

## 2025-02-05 PROCEDURE — 85025 COMPLETE CBC W/AUTO DIFF WBC: CPT

## 2025-02-05 PROCEDURE — 80053 COMPREHEN METABOLIC PANEL: CPT | Performed by: STUDENT IN AN ORGANIZED HEALTH CARE EDUCATION/TRAINING PROGRAM

## 2025-02-05 PROCEDURE — 84466 ASSAY OF TRANSFERRIN: CPT | Performed by: STUDENT IN AN ORGANIZED HEALTH CARE EDUCATION/TRAINING PROGRAM

## 2025-02-05 PROCEDURE — 85045 AUTOMATED RETICULOCYTE COUNT: CPT | Performed by: STUDENT IN AN ORGANIZED HEALTH CARE EDUCATION/TRAINING PROGRAM

## 2025-02-11 LAB
ASSESSMENT OF LEUKOCYTES: NORMAL
CLINICAL INFO: NORMAL
GATING STRATEGY: NORMAL
IMMUNOPHENOTYPING STUDY: NORMAL
LABORATORY COMMENT REPORT: NORMAL
PATH INTERP SPEC-IMP: NORMAL
PATHOLOGIST NAME: NORMAL
SPECIMEN SOURCE: NORMAL
VIABLE CELLS NFR SPEC: NORMAL %

## 2025-02-18 ENCOUNTER — TREATMENT (OUTPATIENT)
Dept: PHYSICAL THERAPY | Facility: CLINIC | Age: 70
End: 2025-02-18
Payer: MEDICARE

## 2025-02-18 DIAGNOSIS — M79.89 SWELLING OF LOWER EXTREMITY: ICD-10-CM

## 2025-02-18 DIAGNOSIS — M21.41 ACQUIRED PES PLANUS, RIGHT: ICD-10-CM

## 2025-02-18 DIAGNOSIS — M79.671 RIGHT FOOT PAIN: Primary | ICD-10-CM

## 2025-02-18 DIAGNOSIS — M21.861 ACQUIRED POSTERIOR EQUINUS, RIGHT: ICD-10-CM

## 2025-02-18 DIAGNOSIS — M19.071 ARTHRITIS OF MIDTARSAL JOINT OF RIGHT FOOT: ICD-10-CM

## 2025-02-18 NOTE — PROGRESS NOTES
Physical Therapy Daily Treatment Note  Visit: 19        Patient: Marilyn Pineda   : 1955  Diagnosis/ICD-10 Code:  Right foot pain [M79.671]  Referring practitioner: AGNES Kwong DPM  Date of Initial Visit: Type: THERAPY  Noted: 2024  Today's Date: 2025  Patient seen for 19 sessions       Visit Diagnoses:    ICD-10-CM ICD-9-CM   1. Right foot pain  M79.671 729.5   2. 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 Acquired posterior equinus, right  M21.861 736.72   4. Acquired pes planus, right  M21.41 734   5. Swelling of lower extremity  M79.89 729.81       Subjective     Marilyn Pineda reports: doing much better with her balance and R foot pain. States she is starting to be able to move her R ankle inward with more ease.     Objective   See Exercise, Manual, and Modality Logs for complete treatment.       Assessment/Plan  Excellent improvement noted in stability during balance activities this date as well as significant improvement in gait pattern. Plan to wean to 1x per 2 weeks.     Progress per Plan of Care.         Timed:  Therapeutic Exercise:    15     mins  84617;     Neuromuscular Karina:    23    mins  30379;      Timed Treatment:   38   mins   Total Treatment:     38   mins        Oliiva Gutierrez PT, SHARDA  Physical Therapist  PT license: [place] [id number]

## 2025-03-04 ENCOUNTER — TREATMENT (OUTPATIENT)
Dept: PHYSICAL THERAPY | Facility: CLINIC | Age: 70
End: 2025-03-04
Payer: MEDICARE

## 2025-03-04 DIAGNOSIS — M79.671 RIGHT FOOT PAIN: Primary | ICD-10-CM

## 2025-03-04 DIAGNOSIS — M21.861 ACQUIRED POSTERIOR EQUINUS, RIGHT: ICD-10-CM

## 2025-03-04 DIAGNOSIS — M19.071 ARTHRITIS OF MIDTARSAL JOINT OF RIGHT FOOT: ICD-10-CM

## 2025-03-04 DIAGNOSIS — M21.41 ACQUIRED PES PLANUS, RIGHT: ICD-10-CM

## 2025-03-04 DIAGNOSIS — M79.89 SWELLING OF LOWER EXTREMITY: ICD-10-CM

## 2025-03-04 NOTE — PROGRESS NOTES
Physical Therapy Progress Note/ Re-Assessment      Patient: Marilyn Pineda   : 1955  Diagnosis/ICD-10 Code:  Right foot pain [M79.671]  Referring practitioner: AGNES Kwong DPM  Date of Initial Visit: 3/4/2025  Today's Date: 3/4/2025  Patient seen for 20 sessions      Subjective:   Visit Diagnoses:    ICD-10-CM ICD-9-CM   1. Right foot pain  M79.671 729.5   2. 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 Acquired posterior equinus, right  M21.861 736.72   4. Acquired pes planus, right  M21.41 734   5. Swelling of lower extremity  M79.89 729.81       Marilyn Pineda reports: getting stronger in her R foot and is able to move her ankle more. States she feels her balance is improving.   Subjective Questionnaire: FAAM: 77.5  Clinical Progress: improved  Home Program Compliance: Yes  Treatment has included: therapeutic exercise, neuromuscular re-education, manual therapy, therapeutic activity, and gait training    Subjective   Objective          Observations     Additional Ankle/Foot Observation Details  Pes planus R>L    Active Range of Motion   Left Ankle/Foot   Normal active range of motion    Right Ankle/Foot   Dorsiflexion (ke): 7 degrees   Plantar flexion: 45 degrees   Inversion: 25 degrees   Eversion: 12 degrees     Strength/Myotome Testing     Left Ankle/Foot   Normal strength    Right Ankle/Foot   Dorsiflexion: 3+  Plantar flexion: 4-  Inversion: 3-  Eversion: 4    Additional Strength Details  Within available ROM    Ambulation     Observational Gait     Additional Observational Gait Details  Trendelenburg       Assessment & Plan       Assessment  Impairments: abnormal gait, abnormal or restricted ROM, impaired balance, impaired physical strength, lacks appropriate home exercise program, pain with function and safety issue   Functional limitations: walking, uncomfortable because of pain, standing, stooping and unable to perform repetitive tasks   Assessment details: Pt has attended  20 visits in skilled PT. She is making good progress towards all goals, but cont to demo significant weakness in R LE. PROM of R ankle is nearing normal limits and strength is slowly progressing. Balance remains significantly impaired which increases her risk for falls, but this is also improving with skilled intervention. Pt would benefit from skilled PT to address the above findings and improve pt's ease with functional mobility and return to PLOF.    Barriers to therapy: None  Prognosis: good    Goals  Plan Goals: ST.Pt will report reduction in worst pain level to 2/10 in 2 weeks. MET  2.Pt will improve AROM of R ankle DF to 5 deg in 2 weeks. MET  3. Pt will demo good tolerance and compliance with HEP in 2 weeks. MET    LT.Pt will be independent with HEP in 6 weeks for self management and prevention of re-occurrence. PROGRESSING  2.Pt will improve strength of R ankle to 4+/5 throughout in 6 weeks.PROGRESSING  3.Pt will improve AROM of R ankle to WFL in 6 weeks.PROGRESSING  4. Pt will ambulate with normalized gait pattern in 6 weeks.PROGRESSING    Plan  Therapy options: will be seen for skilled therapy services  Planned modality interventions: cryotherapy, electrical stimulation/Russian stimulation, thermotherapy (hydrocollator packs) and ultrasound  Planned therapy interventions: flexibility, functional ROM exercises, gait training, home exercise program, joint mobilization, neuromuscular re-education, manual therapy, strengthening, stretching and therapeutic activities  Frequency: 2x week  Duration in weeks: 6  Treatment plan discussed with: patient    Progress toward previous goals: Partially Met        Recommendations: Continue as planned    Timed:  Therapeutic Exercise:    15     mins  86822;     Neuromuscular Karina:    23    mins  38175;      Untimed:  Re-Eval      8      mins  11071    Timed Treatment:   38   mins   Total Treatment:     46   mins    PT Signature: Olivia Gutierrez PT, DPT  PT  license: [place] [id number]

## 2025-03-12 ENCOUNTER — TREATMENT (OUTPATIENT)
Dept: PHYSICAL THERAPY | Facility: CLINIC | Age: 70
End: 2025-03-12
Payer: MEDICARE

## 2025-03-12 DIAGNOSIS — M79.89 SWELLING OF LOWER EXTREMITY: ICD-10-CM

## 2025-03-12 DIAGNOSIS — M21.41 ACQUIRED PES PLANUS, RIGHT: ICD-10-CM

## 2025-03-12 DIAGNOSIS — M21.861 ACQUIRED POSTERIOR EQUINUS, RIGHT: ICD-10-CM

## 2025-03-12 DIAGNOSIS — M79.671 RIGHT FOOT PAIN: Primary | ICD-10-CM

## 2025-03-12 DIAGNOSIS — M19.071 ARTHRITIS OF MIDTARSAL JOINT OF RIGHT FOOT: ICD-10-CM

## 2025-03-12 NOTE — PROGRESS NOTES
Physical Therapy Daily Treatment Note  Visit: 21        Patient: Marilyn Pineda   : 1955  Diagnosis/ICD-10 Code:  Right foot pain [M79.671]  Referring practitioner: AGNES Kwong DPM  Date of Initial Visit: Type: THERAPY  Noted: 2024  Today's Date: 3/12/2025  Patient seen for 21 sessions       Visit Diagnoses:    ICD-10-CM ICD-9-CM   1. Right foot pain  M79.671 729.5   2. 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 Acquired posterior equinus, right  M21.861 736.72   4. Acquired pes planus, right  M21.41 734   5. Swelling of lower extremity  M79.89 729.81       Subjective     Marilyn Pineda reports: having pain into her entire R LE today. States her gait may be contributing.     Objective   See Exercise, Manual, and Modality Logs for complete treatment.       Assessment/Plan  Pt does demo antalgic gait pattern this date. STM to R LE performed with good response. Additional stretches added with good carry over. Will update POC as indicated.     Progress per Plan of Care.         Timed:  Manual Therapy:    15     mins  29941;  Therapeutic Exercise:    15     mins  63984;     Therapeutic Activity:     8     mins  48202;       Timed Treatment:   38   mins   Total Treatment:     38   mins        Olivia Gutierrez PT, JACKIET  Physical Therapist  PT license: [place] [id number]

## 2025-03-18 ENCOUNTER — TREATMENT (OUTPATIENT)
Dept: PHYSICAL THERAPY | Facility: CLINIC | Age: 70
End: 2025-03-18
Payer: MEDICARE

## 2025-03-18 DIAGNOSIS — M21.41 ACQUIRED PES PLANUS, RIGHT: ICD-10-CM

## 2025-03-18 DIAGNOSIS — M79.671 RIGHT FOOT PAIN: Primary | ICD-10-CM

## 2025-03-18 DIAGNOSIS — M19.071 ARTHRITIS OF MIDTARSAL JOINT OF RIGHT FOOT: ICD-10-CM

## 2025-03-18 DIAGNOSIS — M79.89 SWELLING OF LOWER EXTREMITY: ICD-10-CM

## 2025-03-18 DIAGNOSIS — M21.861 ACQUIRED POSTERIOR EQUINUS, RIGHT: ICD-10-CM

## 2025-03-18 NOTE — PROGRESS NOTES
Physical Therapy Daily Treatment Note  Visit: 22        Patient: Marilyn Pineda   : 1955  Diagnosis/ICD-10 Code:  Right foot pain [M79.671]  Referring practitioner: AGNES Kwong DPM  Date of Initial Visit: Type: THERAPY  Noted: 2024  Today's Date: 3/18/2025  Patient seen for 22 sessions       Visit Diagnoses:    ICD-10-CM ICD-9-CM   1. Right foot pain  M79.671 729.5   2. 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 Acquired posterior equinus, right  M21.861 736.72   4. Acquired pes planus, right  M21.41 734   5. Swelling of lower extremity  M79.89 729.81       Subjective     Marilyn Pineda reports: much improved R LE pain after treatment last session. Still mildly sore.     Objective   See Exercise, Manual, and Modality Logs for complete treatment.       Assessment/Plan  Cont previous tx per pt reporting significant relief.Pt demo good carry over of HEP and compliance. Follow up in 2 weeks to determine progress and further need of care.     Progress per Plan of Care.         Timed:  Manual Therapy:    15     mins  67393;  Therapeutic Exercise:    15     mins  29032;     Therapeutic Activity:     8     mins  87194;         Timed Treatment:   38   mins   Total Treatment:     38   mins        Olivia Gutierrez PT, DPT  Physical Therapist  PT license: [place] [id number]

## 2025-04-02 ENCOUNTER — TREATMENT (OUTPATIENT)
Dept: PHYSICAL THERAPY | Facility: CLINIC | Age: 70
End: 2025-04-02
Payer: MEDICARE

## 2025-04-02 DIAGNOSIS — M79.89 SWELLING OF LOWER EXTREMITY: ICD-10-CM

## 2025-04-02 DIAGNOSIS — M21.861 ACQUIRED POSTERIOR EQUINUS, RIGHT: ICD-10-CM

## 2025-04-02 DIAGNOSIS — M19.071 ARTHRITIS OF MIDTARSAL JOINT OF RIGHT FOOT: ICD-10-CM

## 2025-04-02 DIAGNOSIS — M21.41 ACQUIRED PES PLANUS, RIGHT: ICD-10-CM

## 2025-04-02 DIAGNOSIS — M79.671 RIGHT FOOT PAIN: Primary | ICD-10-CM

## 2025-04-02 NOTE — PROGRESS NOTES
Physical Therapy Daily Treatment Note  Visit: 23        Patient: Marilyn Pineda   : 1955  Diagnosis/ICD-10 Code:  Right foot pain [M79.671]  Referring practitioner: AGNES Kwong DPM  Date of Initial Visit: Type: THERAPY  Noted: 2024  Today's Date: 2025  Patient seen for 23 sessions       Visit Diagnoses:    ICD-10-CM ICD-9-CM   1. Right foot pain  M79.671 729.5   2. 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 Acquired posterior equinus, right  M21.861 736.72   4. Acquired pes planus, right  M21.41 734   5. Swelling of lower extremity  M79.89 729.81       Subjective     Marilyn Pineda reports: much improved R foot pain, but R hip is still bothering her from gait changes.     Objective   See Exercise, Manual, and Modality Logs for complete treatment.       Assessment/Plan  Working on strength and stability of R LE/ core as tolerated. STM to LE performed with good tolerance and reports of decreased pain post session.     Progress per Plan of Care.         Timed:  Manual Therapy:    15     mins  47085;  Therapeutic Exercise:    15     mins  42213;     Therapeutic Activity:     8     mins  93799;         Timed Treatment:   38   mins   Total Treatment:     38   mins        Olivia Gutierrez PT, SHARDA  Physical Therapist  PT license: [place] [id number]

## 2025-04-22 ENCOUNTER — TREATMENT (OUTPATIENT)
Dept: PHYSICAL THERAPY | Facility: CLINIC | Age: 70
End: 2025-04-22
Payer: MEDICARE

## 2025-04-22 DIAGNOSIS — M21.41 ACQUIRED PES PLANUS, RIGHT: ICD-10-CM

## 2025-04-22 DIAGNOSIS — M79.89 SWELLING OF LOWER EXTREMITY: ICD-10-CM

## 2025-04-22 DIAGNOSIS — M19.071 ARTHRITIS OF MIDTARSAL JOINT OF RIGHT FOOT: ICD-10-CM

## 2025-04-22 DIAGNOSIS — M79.671 RIGHT FOOT PAIN: Primary | ICD-10-CM

## 2025-04-22 DIAGNOSIS — M21.861 ACQUIRED POSTERIOR EQUINUS, RIGHT: ICD-10-CM

## 2025-04-22 PROCEDURE — 97110 THERAPEUTIC EXERCISES: CPT | Performed by: PHYSICAL THERAPIST

## 2025-04-22 PROCEDURE — 97164 PT RE-EVAL EST PLAN CARE: CPT | Performed by: PHYSICAL THERAPIST

## 2025-04-22 PROCEDURE — 97530 THERAPEUTIC ACTIVITIES: CPT | Performed by: PHYSICAL THERAPIST

## 2025-04-22 NOTE — PROGRESS NOTES
Physical Therapy Re-Certification of Plan of Care        Patient: Marilyn Pineda   : 1955  Diagnosis/ICD-10 Code:  Right foot pain [M79.671]  Referring practitioner: AGNES Kwong DPM  Date of Initial Visit: 2025  Today's Date: 2025  Patient seen for 24 sessions      Subjective:   Visit Diagnoses:    ICD-10-CM ICD-9-CM   1. Right foot pain  M79.671 729.5   2. 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 Acquired posterior equinus, right  M21.861 736.72   4. Acquired pes planus, right  M21.41 734   5. Swelling of lower extremity  M79.89 729.81       Marilyn Pineda reports: doing very well. Balance is much improved and reports her R foot pain is resolved. Wishes to trial HEP at this time.     Subjective   Objective          Observations     Additional Ankle/Foot Observation Details  Pes planus R>L    Active Range of Motion   Left Ankle/Foot   Normal active range of motion    Right Ankle/Foot   Normal active range of motion    Strength/Myotome Testing     Left Ankle/Foot   Normal strength    Right Ankle/Foot   Dorsiflexion: 4+  Plantar flexion: 4+  Inversion: 3+  Eversion: 4+    Ambulation     Observational Gait     Additional Observational Gait Details  Trendelenburg       Assessment & Plan       Assessment  Impairments: abnormal gait, abnormal or restricted ROM, impaired balance, impaired physical strength, lacks appropriate home exercise program, pain with function and safety issue   Functional limitations: walking, uncomfortable because of pain, standing, stooping and unable to perform repetitive tasks   Assessment details: Pt has attended 24 visits in skilled PT. She has made good progress towards all goals, including strength of R ankle. Pt demo normal AROM of R ankle, but does cont with weakness with inversion. Pt wishes to trial HEP at this time and is to follow up as needed. Pt to call in next 60 days if problems arise, otherwise will d/c from formal skilled PT.      Barriers to therapy: None  Prognosis: good    Goals  Plan Goals: ST.Pt will report reduction in worst pain level to 2/10 in 2 weeks. MET  2.Pt will improve AROM of R ankle DF to 5 deg in 2 weeks. MET  3. Pt will demo good tolerance and compliance with HEP in 2 weeks. MET    LT.Pt will be independent with HEP in 6 weeks for self management and prevention of re-occurrence. MET  2.Pt will improve strength of R ankle to 4+/5 throughout in 6 weeks.PROGRESSING  3.Pt will improve AROM of R ankle to WFL in 6 weeks.MET  4. Pt will ambulate with normalized gait pattern in 6 weeks.PROGRESSING    Plan  Therapy options: will be seen for skilled therapy services  Planned modality interventions: cryotherapy, electrical stimulation/Russian stimulation, thermotherapy (hydrocollator packs) and ultrasound  Planned therapy interventions: flexibility, functional ROM exercises, gait training, home exercise program, joint mobilization, neuromuscular re-education, manual therapy, strengthening, stretching and therapeutic activities  Treatment plan discussed with: patient  Plan details: PRN      Progress toward previous goals: Partially Met        Recommendations: follow up PRN       Based upon review of the patient's progress and continued therapy plan, it is my medical opinion that Marilyn Pineda should continue physical therapy treatment at [facility]  [address] [place] [address] [address] [place] [id number] [phone].    Timed:  Therapeutic Exercise:    15     mins  15761;     Therapeutic Activity:     15     mins  61469;       Untimed:  Re-Eval      8      mins  24256    Timed Treatment:   30   mins   Total Treatment:     38   mins      PT Signature: Olivia Gutierrez PT, DPT  PT license: [id number]       Certification Period: 2025  I certify that the therapy services are furnished while this patient is under my care.  The services outlined above are required by this patient,  and will be reviewed every 90 days.    Physician Signature: _________________________________________    PHYSICIAN: AGNES Kwong DPM  DATE:

## 2025-04-29 ENCOUNTER — TELEPHONE (OUTPATIENT)
Dept: ONCOLOGY | Facility: CLINIC | Age: 70
End: 2025-04-29

## 2025-04-29 NOTE — TELEPHONE ENCOUNTER
Caller: Marilyn Pineda    Relationship to patient: Self    Best call back number: [phone]    Type of visit: LAB, FU1    Requested date: LATER IN MAY     If rescheduling, when is the original appointment: 5/6

## 2025-05-28 ENCOUNTER — TREATMENT (OUTPATIENT)
Dept: PHYSICAL THERAPY | Facility: CLINIC | Age: 70
End: 2025-05-28
Payer: MEDICARE

## 2025-05-28 DIAGNOSIS — M79.89 SWELLING OF LOWER EXTREMITY: ICD-10-CM

## 2025-05-28 DIAGNOSIS — M79.671 RIGHT FOOT PAIN: Primary | ICD-10-CM

## 2025-05-28 DIAGNOSIS — M19.071 ARTHRITIS OF MIDTARSAL JOINT OF RIGHT FOOT: ICD-10-CM

## 2025-05-28 DIAGNOSIS — M21.41 ACQUIRED PES PLANUS, RIGHT: ICD-10-CM

## 2025-05-28 DIAGNOSIS — M21.861 ACQUIRED POSTERIOR EQUINUS, RIGHT: ICD-10-CM

## 2025-05-28 NOTE — PROGRESS NOTES
Physical Therapy Daily Treatment Note  Visit: 25        Patient: Marilyn Pineda   : 1955  Diagnosis/ICD-10 Code:  Right foot pain [M79.671]  Referring practitioner: AGNES Kwong DPM  Date of Initial Visit: Type: THERAPY  Noted: 2024  Today's Date: 2025  Patient seen for 25 sessions       Visit Diagnoses:    ICD-10-CM ICD-9-CM   1. Right foot pain  M79.671 729.5   2. 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 Acquired posterior equinus, right  M21.861 736.72   4. Acquired pes planus, right  M21.41 734   5. Swelling of lower extremity  M79.89 729.81       Subjective     Marilyn Pineda reports: increased pain on R lateral and posterior hip. States she has been noticing redness in the area. This is the same leg as her foot pain.     Objective   See Exercise, Manual, and Modality Logs for complete treatment.     Patient consented to observation and treatment by Deepika Gonzalez, SPT, [facility].    Assessment/Plan  Observed severe redness of R posterio lateral hip today with blanching. TTP throughout and bogginess of skin. Advised pt to go to  for evaluation of possible infection/ cellulitis. Pt agreed to plan. Advised to return once skin has cleared and she is able to return to care for R foot pain.        Timed:  Therapeutic Activity:     15     mins  01054;         Timed Treatment:   15   mins   Total Treatment:     15   mins        Olivia Gutierrez PT, DPT  Physical Therapist  PT license: [id number]     Deepika Gonzalez  Student Physical Therapist

## 2025-06-05 ENCOUNTER — OFFICE VISIT (OUTPATIENT)
Dept: FAMILY MEDICINE CLINIC | Facility: CLINIC | Age: 70
End: 2025-06-05
Payer: MEDICARE

## 2025-06-05 VITALS
HEART RATE: 87 BPM | OXYGEN SATURATION: 99 % | BODY MASS INDEX: 31.07 KG/M2 | HEIGHT: 64 IN | RESPIRATION RATE: 16 BRPM | SYSTOLIC BLOOD PRESSURE: 156 MMHG | DIASTOLIC BLOOD PRESSURE: 98 MMHG | WEIGHT: 182 LBS

## 2025-06-05 DIAGNOSIS — L03.115 CELLULITIS OF HIP, RIGHT: Primary | ICD-10-CM

## 2025-06-06 NOTE — PROGRESS NOTES
HEMATOLOGY ONCOLOGY HOSPITAL FOLLOW UP       Patient name: Marilyn Pineda  : 1955  MRN: [id number]  Primary Care Physician: Cinthia Brown MD  Referring Physician: No ref. provider found      History of Present Illness:    Marilyn Pineda is a 69 y.o.  female who presented to [facility] on 10/6/2024 with complaints of nausea and vomiting, diarrhea for several days. She denies fever or chills.      10/6/2024  CT abdomen pelvis without contrast  Impression:  1.Abnormal soft tissue density centered in the right hemipelvis, nonspecific in appearance but suspected to largely represent fibrosis and/or post XRT changes. Other possibilities such as underlying neoplasm or infection are considered less likely,   however, not definitively excluded.  2.Partial versus early complete small bowel obstruction in this region.  3.Apparent wall thickening of the cecum favored to be secondary to post XRT change although superimposed active inflammation or neoplasm cannot be definitively excluded.  4.Obstruction of the distal right ureter by the above process with resulting severe right hydronephrosis and hydroureter. Severe right renal parenchymal atrophy.  5.Previous hysterectomy  6.Mesentery and right inguinal adenopathy in the lower abdomen and pelvis could be inflammatory or neoplastic in etiology.  7.Multiple vascular collaterals noted in the anterior pelvic wall suggesting some degree of obstruction of the right external and internal iliac artery and veins. Evaluation is limited by the lack of IV contrast.        10/6/2024 CT chest abdomen pelvis with contrast  IMPRESSION:  Chest-  No evidence of metastatic disease. No acute process demonstrated     Abdomen/pelvis-  1. There is ill-defined soft tissue in the right side of the pelvis. Finding could represent treatment related changes and fibrosis. Tumor not excluded, in comparison with any outside prior imaging would be helpful with this regard. The  abnormality   results in right ureteral obstruction which may be longstanding given the presence of cortical atrophy, and right iliac vein obstruction which is associated with collateral formation.  2. Distal small bowel obstruction with transition at the terminal ileum. Thickening of the terminal ileum may relate to radiation enteropathy or fibrosis. This may also account for thickening of the base of the cecum. Wall thickening of the urinary   bladder suggests radiation cystitis  3. Abnormal appearance of the right SI joint and adjacent bones. 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 The presence of infection of the SI joint is not excluded  4. Thickening of the right iliopsoas muscle may be treatment related or secondary to adjacent SI joint infection if present  5. 3 mm indeterminate liver lesion, likely cyst. Follow-up recommended  6. Mild mesenteric adenopathy, and enlarged right groin lymph nodes     10/6/2024 CT head w/wo contrast  Impression: No acute intracranial pathology. No abnormal intracranial enhancement.      Urology has been consulted. They believe hydronephrosis is chronic and unlikely to be the source of her nausea.  GI and general surgery have also been consulted.     10/07/24  Hematology/Oncology was consulted.  Patient reports a remote history of cervical cancer diagnosed in 1993 status post hysterectomy.  Of note, she did have a positive Cologuard screening in June 2024 and is scheduled for outpatient colonoscopy.     10/7/24: patient seen today for initial evaluation. Noted to be comfortable. Denied any acute complaints. No abdominal pain at this time. Nausea/vomiting has improved.     10/8/24: Right inguinal LN Biopsy:  Pathology: Scant benign reactive lymphoid tissue. No malignancy identified  Flow: No significant lymphoid immunophenotypic abnormalities (limited sample)      10/17/24-10/19/24: Interval Hospitalization update:  Patient was doing colonoscopy prep when she  "experienced the nausea vomiting that brought her to the ED.  Repeat KUB was obtained and GI was notified of resolving SBO.  GI performed colonoscopy on 10-, under general anesthesia.  Per GI, \"-Partial small bowel obstruction seems improved clinically.  An OG tube was placed during the procedure with no fluid suctioned out.  Status post colonoscopy, pt was able to move her bowels and was discharged after she was noted to have good tolerance to food.    11/5/24: Patient seen today for follow-up after her hospitalization as above to discuss biopsy findings.  She was again hospitalized after her discharge as above for recurrent small bowel obstruction which resolved with conservative management.  She is clinically stable at present and denied any GI symptoms.     2/5/2025: Patient seen today for routine follow-up.  She reports overall doing well and has no acute complaints today.  She is clinically stable and denies having any GI symptoms.  She did not proceed with pelvic MRI would like to continue to hold off on imaging at this time, particularly since she is having no new symptoms.  She continues to follow with gastroenterology.      Subjective:  6/11/25: Pt seen for follow up. Overall clinically doing well. No recent ER visits or hospital admissions. Denied any GI symptoms presently. Has not undergone Pelvic MRI as previously planned.    Past Medical History:   Diagnosis Date    Allergic     amoxicilin clayvix    Anemia     Arthritis 07/17/2024    In right  foot    Bunion several years ago. not sure when    Cancer 1993    cervial cancer/ radiation and had hysterectomy    Colon polyp 10-18-24    Fallen arches about 4 years ago    on right foot    Neuropathy in diabetes about 4 years  not sure    have not be diagnosed  or seen about it.  it is numb in the toe area all the way across    Pain of right hip joint 01/09/2018    Right lower lobe pneumonia 01/09/2018    Small bowel obstruction 10/2024    Thrush, oral " 2018       Past Surgical History:   Procedure Laterality Date     SECTION  1984    COLONOSCOPY N/A 10/18/2024    Procedure: COLONOSCOPY WITH POLYPECTOMY X2;  Surgeon: NADEEM Aggarwal MD;  Location: [facility] ENDOSCOPY;  Service: Gastroenterology;  Laterality: N/A;  POST: POLYPS    HAND SURGERY      HYSTERECTOMY      LYMPH NODE BIOPSY  10-8-24    US GUIDED LYMPH NODE BIOPSY  10/08/2024         Current Outpatient Medications:     BIOTIN PO, Take 1 tablet by mouth Daily. (Patient not taking: Reported on 10/28/2024), Disp: , Rfl:     Calcium-Vitamin D-Vitamin K (CALCIUM + D PO), Take  by mouth Daily., Disp: , Rfl:     clindamycin (CLEOCIN) 300 MG capsule, Take 1 capsule by mouth 3 (Three) Times a Day for 10 days., Disp: 30 capsule, Rfl: 0    cyanocobalamin (VITAMIN B-12) 2500 MCG tablet tablet, Take 500 mcg by mouth Daily., Disp: , Rfl:     Ferrous Sulfate (IRON PO), Take 65 mg by mouth Every Other Day. (Patient not taking: Reported on 2024), Disp: , Rfl:     Misc Natural Products (BEET ROOT PO), Take 500 mg by mouth Daily., Disp: , Rfl:     Multiple Minerals-Vitamins (Calcium-Magnesium-Zinc-D3) tablet, Take 1 tablet by mouth Daily. (Patient not taking: Reported on 2025), Disp: , Rfl:     multivitamin with minerals (MULTIVITAMIN WOMEN PO), Take 1 tablet by mouth Daily., Disp: , Rfl:     vitamin B-6 (PYRIDOXINE) 50 MG tablet, Take 2 tablets by mouth Daily. (Patient not taking: Reported on 2025), Disp: , Rfl:     Allergies   Allergen Reactions    Augmentin [Amoxicillin-Pot Clavulanate] Hives       Family History   Problem Relation Age of Onset    COPD Mother         from smoking for years    Miscarriages / Stillbirths Mother         had a miscarriage    Thyroid disease Father         is taking thyroid pil;    Cancer Maternal Grandmother         had lung cancer    Diabetes Brother        Cancer-related family history includes Cancer in her maternal grandmother.    Social History     Tobacco  Use    Smoking status: Never    Smokeless tobacco: Never   Vaping Use    Vaping status: Never Used   Substance Use Topics    Alcohol use: Not Currently     Comment: may drink a little wine but only very occasionally    Drug use: No     Social History     Social History Narrative    Not on file          ROS:   Review of Systems   Constitutional: Negative.    HENT: Negative.     Eyes: Negative.    Respiratory: Negative.     Cardiovascular: Negative.    Gastrointestinal: Negative.    Endocrine: Negative.    Genitourinary: Negative.    Musculoskeletal: Negative.    Skin: Negative.    Allergic/Immunologic: Negative.    Neurological: Negative.    Hematological: Negative.    Psychiatric/Behavioral: Negative.         Objective:  Vital signs:  There were no vitals filed for this visit.      There is no height or weight on file to calculate BMI.  ECOG  (0) Fully active, able to carry on all predisease performance without restriction    Physical Exam:   Physical Exam  Vitals reviewed.   Constitutional:       Appearance: Normal appearance.   HENT:      Head: Normocephalic and atraumatic.   Eyes:      General: No scleral icterus.     Pupils: Pupils are equal, round, and reactive to light.   Cardiovascular:      Rate and Rhythm: Normal rate and regular rhythm.      Pulses: Normal pulses.      Heart sounds: No murmur heard.  Pulmonary:      Effort: Pulmonary effort is normal.      Breath sounds: Normal breath sounds.   Abdominal:      General: There is no distension.      Palpations: Abdomen is soft. There is no mass.      Tenderness: There is no abdominal tenderness.   Musculoskeletal:         General: Normal range of motion.      Cervical back: Normal range of motion and neck supple.   Skin:     General: Skin is warm.      Coloration: Skin is not jaundiced.      Findings: No bruising, erythema, lesion or rash.   Neurological:      General: No focal deficit present.      Mental Status: She is alert and oriented to person, place,  "and time.   Psychiatric:         Mood and Affect: Mood normal.         Behavior: Behavior normal.         Thought Content: Thought content normal.         Judgment: Judgment normal.         Lab Results - Last 18 Months   Lab Units 02/05/25  1021 11/05/24  1252 10/19/24  0009   WBC 10*3/mm3 8.52 7.77 9.90   HEMOGLOBIN g/dL 11.2* 10.2* 9.3*   HEMATOCRIT % 37.6 33.7* 29.6*   PLATELETS 10*3/mm3 318 309 380   MCV fL 89.5 90.3 85.1     Lab Results - Last 18 Months   Lab Units 02/05/25  1021 10/19/24  0009 10/18/24  0405 10/17/24  1049 10/07/24  0040 10/06/24  0937   SODIUM mmol/L 140 135* 136 136   < > 138   POTASSIUM mmol/L 4.4 3.6 3.3* 3.3*   < > 4.0   CHLORIDE mmol/L 102 101 99 92*   < > 95*   CO2 mmol/L 29.4* 26.8 26.6 27.6   < > 27.6   BUN mg/dL 20 19 21 18   < > 27*   CREATININE mg/dL 0.84 1.04* 1.72* 1.61*   < > 1.04*   CALCIUM mg/dL 9.5 8.4* 8.5* 9.9   < > 9.4   BILIRUBIN mg/dL 0.4  --   --  0.5  --  0.6   ALK PHOS U/L 85  --   --  92  --  87   ALT (SGPT) U/L 16  --   --  18  --  9   AST (SGOT) U/L 18  --   --  20  --  19   GLUCOSE mg/dL 83 149* 131* 138*   < > 158*    < > = values in this interval not displayed.       Lab Results   Component Value Date    GLUCOSE 83 02/05/2025    BUN 20 02/05/2025    CREATININE 0.84 02/05/2025    BCR 23.8 02/05/2025    K 4.4 02/05/2025    CO2 29.4 (H) 02/05/2025    CALCIUM 9.5 02/05/2025    ALBUMIN 4.1 02/05/2025    AST 18 02/05/2025    ALT 16 02/05/2025       No results for input(s): \"APTT\", \"INR\", \"PTT\" in the last 08435 hours.    Lab Results   Component Value Date    IRON 36 (L) 02/05/2025    TIBC 256 (L) 02/05/2025    FERRITIN 317.00 (H) 02/05/2025       No results found for: \"FOLATE\"    No results found for: \"OCCULTBLD\"    Lab Results   Component Value Date    RETICCTPCT 1.37 02/05/2025     Lab Results   Component Value Date    HYDBWGNN59 981 (H) 05/24/2024     No results found for: \"SPEP\", \"UPEP\"  Uric Acid   Date Value Ref Range Status   02/20/2018 4.9 2.6 - 8.0 mg/dL " "Final     No results found for: \"TREMAYNE\", \"RF\", \"SEDRATE\"  Lab Results   Component Value Date    HAPTOGLOBIN 582 (H) 01/22/2018     No results found for: \"PTT\", \"INR\"  Lab Results   Component Value Date     22.2 11/05/2024     No results found for: \"CEA\"  No components found for: \"CA-19-9\"  No results found for: \"PSA\"  No results found for: \"SEDRATE\"      No radiology results for the last 30 days.    Assessment & Plan     Abnormal soft tissue density in right hemipelvis: Suspicious for malignancy  Mesentery and right inguinal adenopathy  -imaging findings were reviewed as above. The overall picture is concerning for malignancy vs infectious/inflammatory etiology. Less likely to be sequela of prior radiation given long time having passed since XRT (30 years).   -She has right groin lymphadenopathy, this was biopsied on 10/8/24.  This has been reported negative.  -CT abdomen pelvis on 10/17/2024 again reported ill-defined thickening in the right hemipelvis retroperitoneum extending along the pelvic sidewall and continues to the lack muscle, thought to be radiation-induced fibrosis but unclear etiology.  -Will obtain MRI pelvis to further evaluate these findings.  Patient has declined to proceed with pelvic imaging at this time.  - levels reported normal  -Discussed with patient about the need for further diagnostic evaluation of pelvic mass, Will schedule an open MRI, patient is agreeable now.       Partial small bowel obstruction  -Patient was discharged after conservative management of bowel obstruction, however she had a recurrent episode for which she was hospitalized again on 10/15 as above.  Resolved after supportive care without surgical intervention.  -Symptoms have improved.  -She continues to follow with gastroenterology  No new symptoms presently     Normocytic anemia  -Previously noted leukocytosis and thrombocytosis has improved  -Noted to have improvement in anemia, hemoglobin improved to 10.2 " today.  MCV 90.3  -Iron panel previously completed and not consistent with IRISH, but more likely to be Anemia of chronic disease  -She is currently off of oral iron supplementation.  -noted low Hb today again at 9.7. MCV is normal but has increased RDW. Check Soluble transferrin receptor level.      Personal history of cervical cancer diagnosed in 1990s  -Patient is status post hysterectomy and radiation treatment  -low concern for recurrence of  primary at this time.  -urology evaluated the pt. No further interventions recommended.    Elevated Blood pressure  -Patient reports no new symptoms.  Encouraged monitoring of blood pressure at home and follow-up with PCP.  Discussed red flag symptoms that warrant prompt medical evaluation      Follow-up in 2 months with repeat labs and MRI pelvis 2 weeks prior, sooner as needed.      Thank you very much for providing the opportunity to participate in this patient’s care. Please do not hesitate to call if there are any other questions.

## 2025-06-11 ENCOUNTER — OFFICE VISIT (OUTPATIENT)
Dept: ONCOLOGY | Facility: CLINIC | Age: 70
End: 2025-06-11
Payer: MEDICARE

## 2025-06-11 ENCOUNTER — LAB (OUTPATIENT)
Dept: LAB | Facility: HOSPITAL | Age: 70
End: 2025-06-11
Payer: MEDICARE

## 2025-06-11 VITALS
DIASTOLIC BLOOD PRESSURE: 106 MMHG | OXYGEN SATURATION: 100 % | SYSTOLIC BLOOD PRESSURE: 165 MMHG | HEIGHT: 64 IN | HEART RATE: 82 BPM | BODY MASS INDEX: 31.28 KG/M2 | WEIGHT: 183.2 LBS

## 2025-06-11 DIAGNOSIS — D64.9 ANEMIA, UNSPECIFIED TYPE: ICD-10-CM

## 2025-06-11 DIAGNOSIS — D64.9 ANEMIA, UNSPECIFIED TYPE: Primary | ICD-10-CM

## 2025-06-11 DIAGNOSIS — D39.8: ICD-10-CM

## 2025-06-11 DIAGNOSIS — R19.00 PELVIC MASS: ICD-10-CM

## 2025-06-11 LAB
ALBUMIN SERPL-MCNC: 4 G/DL (ref 3.5–5.2)
ALBUMIN/GLOB SERPL: 1.2 G/DL
ALP SERPL-CCNC: 78 U/L (ref 39–117)
ALT SERPL W P-5'-P-CCNC: 11 U/L (ref 1–33)
ANION GAP SERPL CALCULATED.3IONS-SCNC: 11.3 MMOL/L (ref 5–15)
AST SERPL-CCNC: 18 U/L (ref 1–32)
BASOPHILS # BLD AUTO: 0.04 10*3/MM3 (ref 0–0.2)
BASOPHILS NFR BLD AUTO: 0.4 % (ref 0–1.5)
BILIRUB SERPL-MCNC: 0.3 MG/DL (ref 0–1.2)
BUN SERPL-MCNC: 15.2 MG/DL (ref 8–23)
BUN/CREAT SERPL: 18.5 (ref 7–25)
CALCIUM SPEC-SCNC: 9.6 MG/DL (ref 8.6–10.5)
CHLORIDE SERPL-SCNC: 100 MMOL/L (ref 98–107)
CO2 SERPL-SCNC: 25.7 MMOL/L (ref 22–29)
CREAT SERPL-MCNC: 0.82 MG/DL (ref 0.57–1)
DEPRECATED RDW RBC AUTO: 56.5 FL (ref 37–54)
EGFRCR SERPLBLD CKD-EPI 2021: 78 ML/MIN/1.73
EOSINOPHIL # BLD AUTO: 0.31 10*3/MM3 (ref 0–0.4)
EOSINOPHIL NFR BLD AUTO: 3.1 % (ref 0.3–6.2)
ERYTHROCYTE [DISTWIDTH] IN BLOOD BY AUTOMATED COUNT: 18.1 % (ref 12.3–15.4)
GLOBULIN UR ELPH-MCNC: 3.3 GM/DL
GLUCOSE SERPL-MCNC: 94 MG/DL (ref 65–99)
HCT VFR BLD AUTO: 33.4 % (ref 34–46.6)
HGB BLD-MCNC: 9.7 G/DL (ref 12–15.9)
LYMPHOCYTES # BLD AUTO: 1.51 10*3/MM3 (ref 0.7–3.1)
LYMPHOCYTES NFR BLD AUTO: 15.1 % (ref 19.6–45.3)
MCH RBC QN AUTO: 25.4 PG (ref 26.6–33)
MCHC RBC AUTO-ENTMCNC: 29 G/DL (ref 31.5–35.7)
MCV RBC AUTO: 87.4 FL (ref 79–97)
MONOCYTES # BLD AUTO: 0.56 10*3/MM3 (ref 0.1–0.9)
MONOCYTES NFR BLD AUTO: 5.6 % (ref 5–12)
NEUTROPHILS NFR BLD AUTO: 7.61 10*3/MM3 (ref 1.7–7)
NEUTROPHILS NFR BLD AUTO: 75.8 % (ref 42.7–76)
PLATELET # BLD AUTO: 534 10*3/MM3 (ref 140–450)
PMV BLD AUTO: 8 FL (ref 6–12)
POTASSIUM SERPL-SCNC: 4.2 MMOL/L (ref 3.5–5.2)
PROT SERPL-MCNC: 7.3 G/DL (ref 6–8.5)
RBC # BLD AUTO: 3.82 10*6/MM3 (ref 3.77–5.28)
SODIUM SERPL-SCNC: 137 MMOL/L (ref 136–145)
WBC NRBC COR # BLD AUTO: 10.03 10*3/MM3 (ref 3.4–10.8)

## 2025-06-11 PROCEDURE — 85025 COMPLETE CBC W/AUTO DIFF WBC: CPT

## 2025-06-11 PROCEDURE — 36415 COLL VENOUS BLD VENIPUNCTURE: CPT

## 2025-06-11 PROCEDURE — 1126F AMNT PAIN NOTED NONE PRSNT: CPT | Performed by: STUDENT IN AN ORGANIZED HEALTH CARE EDUCATION/TRAINING PROGRAM

## 2025-06-11 PROCEDURE — 99214 OFFICE O/P EST MOD 30 MIN: CPT | Performed by: STUDENT IN AN ORGANIZED HEALTH CARE EDUCATION/TRAINING PROGRAM

## 2025-06-11 PROCEDURE — 80053 COMPREHEN METABOLIC PANEL: CPT | Performed by: PHYSICIAN ASSISTANT

## 2025-06-16 LAB — STFR SERPL-SCNC: 24 NMOL/L (ref 12.2–27.3)

## 2025-06-19 ENCOUNTER — TELEPHONE (OUTPATIENT)
Dept: ONCOLOGY | Facility: CLINIC | Age: 70
End: 2025-06-19
Payer: MEDICARE

## 2025-06-19 NOTE — TELEPHONE ENCOUNTER
Caller: Marilyn Pineda    Relationship: Self    Best call back number: [phone]    Caller requesting test results: SELF    What test was performed: LABS    When was the test performed: 06/11    Where was the test performed: [facility]    Additional notes: CAN SEE RESULTS ON MY CHART BUT IS NOT SHOWING IRON LEVELS AND WANTED TO CHECK ON THIS TO FIND OUT WHERE IRON LEVEL WAS.       WANTED TO CHECK ALSO ON MRI OF PELVIS  IT WAS SENT TO [facility]  BUT AS HAD TALKED IN OFFICE WANTED TO DO THIS AT [facility] ON [address].

## 2025-06-19 NOTE — TELEPHONE ENCOUNTER
Spoke with patient letting her know the MRI orders have been sent to [place] and the ferritin and iron levels where not checked at her follow up offered patient the opportunity to come in to have them drawn. Patient states she feels okay right now and would prefer to wait until her August follow up to have the labs done.

## 2025-06-22 NOTE — PROGRESS NOTES
[facility]    06/05/2025       Patient Name: Marilyn Pineda   YOB: 1955   Medical Record Number: [id number]   Primary Care Physician: Cinthia Brown MD       Subjective     Chief Complaint     Chief Complaint   Patient presents with    Hip Pain     Right - was diagnosed with cellulitis at  last Wednesday       History of Present Illness     History of Present Illness  The patient presents for evaluation of cellulitis.    Cellulitis  - Diagnosed with cellulitis after experiencing redness and seeking immediate care due to unavailability of an appointment here  - Condition has improved significantly with only one dose of antibiotics remaining  - Reports a lumpy sensation in the affected area, which has also shown improvement  - Advised to apply ice packs to the area and is considering the use of heat    Sciatica  - Experienced a flare-up of sciatica over a month ago, managed with physical therapy  - Symptoms recurred a few weeks later  - Contemplating resuming physical therapy, which previously included ultrasound treatment for sciatic pain  - Cautious about overexertion and has limited activities, including walking and cooking    Supplemental information: She has an appointment next week with her doctor regarding her iron levels, which have increased. She is not eating as well as she was initially, but she is trying to maintain a good diet. She was advised to stop taking iron pills.      Review of Systems   A medically appropriate and patient-specific review of systems was performed.   Pertinent findings are mentioned in the HPI, with no additional significant findings beyond those already noted.      Patient History   The following portions of the patient's history were reviewed and updated as appropriate:   allergies, current medications, problem list, PMHx, PSHx, PFHx, past social history    Objective     Vitals:    06/05/25 1300   BP: 156/98   Pulse: 87  "  Resp: 16   SpO2: 99%   Weight: 82.6 kg (182 lb)   Height: 162.6 cm (64\")        Physical Exam   Constitutional: Alert, well-appearing, no acute distress  HENT: NCAT, mucous membranes moist  Neck: Supple, no thyromegaly  Respiratory: No respiratory distress, good effort and air entry, clear to auscultation bilaterally   Cardiovascular: RRR, no LE edema  Musculoskeletal: Ecchymosis and soreness noted on right lateral and posterior hip, with decreased swelling, overall improving; no signs of active cellulitis, no bright erythema  Psychiatric: Appropriate mood and affect, cooperative  Skin: No apparent rashes or lesions      Assessment & Plan        Cellulitis of hip, right  The condition has shown significant improvement, with the infection being effectively managed by the antibiotics. However, residual soreness may persist due to muscle involvement. The ecchymosis is likely a result of inflammation and may take some time to completely resolve.   PLAN:  She has been advised to complete her antibiotic course. The use of heat or cold therapy is recommended based on her comfort level. She can resume physical therapy if she feels capable and can tolerate it well. She has been encouraged to engage in activities that she can comfortably manage without overexertion. If the soreness persists until the end of the month, she should inform us to ensure complete resolution of the infection and rule out any deeper underlying issues. If there is a recurrence of bright redness or a sudden increase in pain intensity, she should contact us immediately. Otherwise, the patient will follow up in October for her physical.          Follow Up   Return in about 4 months (around 10/5/2025) for Medicare Wellness.    This medical documentation was produced in part using speech recognition software (Dragon Dictation System) and may contain errors related to that system including errors in grammar, punctuation, and spelling, as well as words and " phrases that may be inappropriate and not intentional --- If there are any questions or concerns please feel free to contact me, the dictating provider, for clarification.     Patient or patient representative verbalized consent for the use of Ambient Listening during the visit with Cinthia Brown MD for chart documentation.     Disclaimer For Patients: Per the Federal 21st Century CURES Act and as of April 2021, medical records (including provider notes and laboratory/imaging results) are to be made available to patient’s and/or their designees as soon as the documents are signed/resulted. While the intention is to ensure transparency and to engage patients in their healthcare, this immediate access may create unintended consequences. This document uses language intended for communication between medical experts and diagnostic results are often interpreted with the entirety of the patient’s clinical picture in mind. It is recommended that patients and/or their designees review all available information with their primary or specialist providers for explanation and guidance to avoid misinterpretation based on layperson understanding, non-medical expert opinions, or internet searches.    Cinthia Brown MD

## 2025-06-24 ENCOUNTER — OFFICE (OUTPATIENT)
Dept: URBAN - METROPOLITAN AREA CLINIC 64 | Facility: CLINIC | Age: 70
End: 2025-06-24
Payer: MEDICARE

## 2025-06-24 VITALS
HEIGHT: 65 IN | SYSTOLIC BLOOD PRESSURE: 160 MMHG | WEIGHT: 187 LBS | HEART RATE: 81 BPM | DIASTOLIC BLOOD PRESSURE: 100 MMHG

## 2025-06-24 DIAGNOSIS — Z86.0101 PERSONAL HISTORY OF ADENOMATOUS AND SERRATED COLON POLYPS: ICD-10-CM

## 2025-06-24 DIAGNOSIS — K59.00 CONSTIPATION, UNSPECIFIED: ICD-10-CM

## 2025-06-24 DIAGNOSIS — Z87.19 PERSONAL HISTORY OF OTHER DISEASES OF THE DIGESTIVE SYSTEM: ICD-10-CM

## 2025-06-24 DIAGNOSIS — K57.90 DIVERTICULOSIS OF INTESTINE, PART UNSPECIFIED, WITHOUT PERFO: ICD-10-CM

## 2025-06-24 PROCEDURE — 99213 OFFICE O/P EST LOW 20 MIN: CPT

## 2025-06-27 ENCOUNTER — TELEPHONE (OUTPATIENT)
Dept: PHYSICAL THERAPY | Facility: CLINIC | Age: 70
End: 2025-06-27

## 2025-07-07 ENCOUNTER — TELEPHONE (OUTPATIENT)
Dept: ONCOLOGY | Facility: CLINIC | Age: 70
End: 2025-07-07

## 2025-07-15 ENCOUNTER — TREATMENT (OUTPATIENT)
Dept: PHYSICAL THERAPY | Facility: CLINIC | Age: 70
End: 2025-07-15
Payer: MEDICARE

## 2025-07-15 DIAGNOSIS — M21.861 ACQUIRED POSTERIOR EQUINUS, RIGHT: ICD-10-CM

## 2025-07-15 DIAGNOSIS — M21.41 ACQUIRED PES PLANUS, RIGHT: ICD-10-CM

## 2025-07-15 DIAGNOSIS — M79.671 RIGHT FOOT PAIN: Primary | ICD-10-CM

## 2025-07-15 DIAGNOSIS — M19.071 ARTHRITIS OF MIDTARSAL JOINT OF RIGHT FOOT: ICD-10-CM

## 2025-07-15 DIAGNOSIS — M79.89 SWELLING OF LOWER EXTREMITY: ICD-10-CM

## 2025-07-15 NOTE — PROGRESS NOTES
"Physical Therapy Re-Certification of Plan of Care        Patient: Marilyn Pineda   : 1955  Diagnosis/ICD-10 Code:  Right foot pain [M79.671]  Referring practitioner: AGNES Kwong DPM  Date of Initial Visit: 7/15/2025  Today's Date: 7/15/2025  Patient seen for 26 sessions      Subjective:   Visit Diagnoses:    ICD-10-CM ICD-9-CM   1. Right foot pain  M79.671 729.5   2. 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 Acquired posterior equinus, right  M21.861 736.72   4. Acquired pes planus, right  M21.41 734   5. Swelling of lower extremity  M79.89 729.81       Marilyn Pineda reports: recent dx of cellulitis in her R hip and increased swelling of R LE. States since this, she has been having trouble with her balance again and reports difficulty walking. States her R ankle has started to \"turn out\" again.   Subjective Questionnaire: FAAM: 60.7  Clinical Progress: recent set back due to cellulitis dx.  Home Program Compliance: Yes  Treatment has included: therapeutic exercise, neuromuscular re-education, manual therapy, therapeutic activity, and gait training    Subjective   Objective          Observations     Additional Ankle/Foot Observation Details  Pes planus R>L    Active Range of Motion   Left Ankle/Foot   Normal active range of motion    Right Ankle/Foot   Dorsiflexion (ke): 5 degrees   Plantar flexion: WFL  Inversion: 0 degrees   Eversion: WFL    Strength/Myotome Testing     Left Ankle/Foot   Normal strength    Right Ankle/Foot   Dorsiflexion: 4+  Plantar flexion: 4+  Inversion: 3+  Eversion: 4+    Ambulation     Observational Gait   Gait: antalgic     Additional Observational Gait Details  Trendelenburg     Functional Assessment     Single Leg Stance   Left: 15 seconds  Right: 5 seconds      Assessment & Plan       Assessment  Impairments: abnormal gait, abnormal or restricted ROM, impaired balance, impaired physical strength, lacks appropriate home exercise program, pain with " function and safety issue   Functional limitations: walking, uncomfortable because of pain, standing, stooping and unable to perform repetitive tasks   Assessment details: Pt has attended 26 visits in skilled PT. She had a recent set back in R ankle strength and ROM due to cellulitis of her R hip and increased swelling of R LE. States her balance has also been hindered and she has to use UE support for functional tasks. Pt would benefit from skilled PT to address the above findings and improve pt's ease with functional mobility and return to PLOF.  Barriers to therapy: None  Prognosis: good    Goals  Plan Goals: ST.Pt will report reduction in worst pain level to 2/10 in 2 weeks. MET  2.Pt will improve AROM of R ankle DF to 5 deg in 2 weeks. MET  3. Pt will demo good tolerance and compliance with HEP in 2 weeks. MET    LT.Pt will be independent with HEP in 6 weeks for self management and prevention of re-occurrence. ADJUSTED  2.Pt will improve strength of R ankle to 4+/5 throughout in 6 weeks.PROGRESSING  3.Pt will improve AROM of R ankle to WFL in 6 weeks. REGRESSED DUE TO CELLULITIS  4. Pt will ambulate with normalized gait pattern in 6 weeks.PROGRESSING    Plan  Therapy options: will be seen for skilled therapy services  Planned modality interventions: cryotherapy, electrical stimulation/Russian stimulation, thermotherapy (hydrocollator packs) and ultrasound  Planned therapy interventions: flexibility, functional ROM exercises, gait training, home exercise program, joint mobilization, neuromuscular re-education, manual therapy, strengthening, stretching, therapeutic activities and balance/weight-bearing training  Frequency: 2x week  Duration in weeks: 6  Treatment plan discussed with: patient    Progress toward previous goals: Partially Met        Recommendations: Continue as planned      Based upon review of the patient's progress and continued therapy plan, it is my medical opinion that Marilyn Pineda  should continue physical therapy treatment at [facility]  [address] [place] [address] [phone].    Timed:  Therapeutic Exercise:    15     mins  85250;     Therapeutic Activity:     15     mins  81346;       Untimed:  Re-Eval      8      mins  00666    Timed Treatment:   30   mins   Total Treatment:     38   mins      PT Signature: Olivia Gutierrez PT, DPT  PT license: [id number]       Certification Period: 10/12/2025  I certify that the therapy services are furnished while this patient is under my care.  The services outlined above are required by this patient, and will be reviewed every 90 days.    Physician Signature: _________________________________________    PHYSICIAN: AGNES Kwong DPM  DATE:

## 2025-07-24 ENCOUNTER — TREATMENT (OUTPATIENT)
Dept: PHYSICAL THERAPY | Facility: CLINIC | Age: 70
End: 2025-07-24
Payer: MEDICARE

## 2025-07-24 DIAGNOSIS — M21.861 ACQUIRED POSTERIOR EQUINUS, RIGHT: ICD-10-CM

## 2025-07-24 DIAGNOSIS — M79.671 RIGHT FOOT PAIN: Primary | ICD-10-CM

## 2025-07-24 DIAGNOSIS — M21.41 ACQUIRED PES PLANUS, RIGHT: ICD-10-CM

## 2025-07-24 DIAGNOSIS — M19.071 ARTHRITIS OF MIDTARSAL JOINT OF RIGHT FOOT: ICD-10-CM

## 2025-07-24 DIAGNOSIS — M79.89 SWELLING OF LOWER EXTREMITY: ICD-10-CM

## 2025-07-24 NOTE — PROGRESS NOTES
Physical Therapy Daily Treatment Note  Visit: 27        Patient: Marilyn Pineda   : 1955  Diagnosis/ICD-10 Code:  Right foot pain [M79.671]  Referring practitioner: AGNES Kwong DPM  Date of Initial Visit: Type: THERAPY  Noted: 2024  Today's Date: 2025  Patient seen for 27 sessions       Visit Diagnoses:    ICD-10-CM ICD-9-CM   1. Right foot pain  M79.671 729.5   2. 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 Acquired posterior equinus, right  M21.861 736.72   4. Acquired pes planus, right  M21.41 734   5. Swelling of lower extremity  M79.89 729.81       Subjective     Marilyn Pineda reports: soreness in her R LE today. States she did some gardening yesterday and is very fatigued.     Objective   See Exercise, Manual, and Modality Logs for complete treatment.     Patient consented to observation and treatment by Deepika Gonzalez, SPT, [facility].    Assessment/Plan  Focused on balance training this date with good tolerance. Low back pain did limit progression this date, but overall good tolerance to all interventions.    Progress per Plan of Care.         Timed:  Therapeutic Exercise:    15     mins  33461;     Neuromuscular Karina:    23    mins  62492;      Timed Treatment:   38   mins   Total Treatment:     38   mins        Olivia Gutierrez PT, DPT  Physical Therapist  PT license: [id number]     Deepika Gonzalez  Student Physical Therapist

## 2025-07-31 ENCOUNTER — DOCUMENTATION (OUTPATIENT)
Dept: ONCOLOGY | Facility: CLINIC | Age: 70
End: 2025-07-31
Payer: MEDICARE

## 2025-07-31 ENCOUNTER — HOSPITAL ENCOUNTER (INPATIENT)
Facility: HOSPITAL | Age: 70
LOS: 5 days | Discharge: HOME | DRG: 549 | End: 2025-08-05
Attending: STUDENT IN AN ORGANIZED HEALTH CARE EDUCATION/TRAINING PROGRAM | Admitting: INTERNAL MEDICINE
Payer: MEDICARE

## 2025-07-31 ENCOUNTER — APPOINTMENT (OUTPATIENT)
Dept: GENERAL RADIOLOGY | Facility: HOSPITAL | Age: 70
DRG: 549 | End: 2025-07-31
Payer: MEDICARE

## 2025-07-31 ENCOUNTER — APPOINTMENT (OUTPATIENT)
Dept: OTHER | Facility: HOSPITAL | Age: 70
DRG: 549 | End: 2025-07-31
Payer: MEDICARE

## 2025-07-31 DIAGNOSIS — M46.58 SEPTIC ARTHRITIS OF SACROILIAC JOINT: ICD-10-CM

## 2025-07-31 DIAGNOSIS — D72.829 LEUKOCYTOSIS, UNSPECIFIED TYPE: ICD-10-CM

## 2025-07-31 DIAGNOSIS — D64.9 ANEMIA, UNSPECIFIED TYPE: Primary | ICD-10-CM

## 2025-07-31 DIAGNOSIS — N39.0 URINARY TRACT INFECTION WITH HEMATURIA, SITE UNSPECIFIED: ICD-10-CM

## 2025-07-31 DIAGNOSIS — R31.9 URINARY TRACT INFECTION WITH HEMATURIA, SITE UNSPECIFIED: ICD-10-CM

## 2025-07-31 PROBLEM — M00.9 SEPTIC ARTHRITIS: Status: ACTIVE | Noted: 2025-07-31

## 2025-07-31 LAB
AMPHET+METHAMPHET UR QL: NEGATIVE
AMPHETAMINES UR QL: NEGATIVE
ANION GAP SERPL CALCULATED.3IONS-SCNC: 12.1 MMOL/L (ref 5–15)
APTT PPP: 26.6 SECONDS (ref 22.7–35.4)
BACTERIA UR QL AUTO: ABNORMAL /HPF
BARBITURATES UR QL SCN: NEGATIVE
BASOPHILS # BLD AUTO: 0.05 10*3/MM3 (ref 0–0.2)
BASOPHILS NFR BLD AUTO: 0.4 % (ref 0–1.5)
BENZODIAZ UR QL SCN: NEGATIVE
BILIRUB UR QL STRIP: NEGATIVE
BUN SERPL-MCNC: 18.3 MG/DL (ref 8–23)
BUN/CREAT SERPL: 19.1 (ref 7–25)
BUPRENORPHINE SERPL-MCNC: NEGATIVE NG/ML
CALCIUM SPEC-SCNC: 9.3 MG/DL (ref 8.6–10.5)
CANNABINOIDS SERPL QL: NEGATIVE
CHLORIDE SERPL-SCNC: 98 MMOL/L (ref 98–107)
CLARITY UR: CLEAR
CO2 SERPL-SCNC: 24.9 MMOL/L (ref 22–29)
COCAINE UR QL: NEGATIVE
COLOR UR: YELLOW
CREAT SERPL-MCNC: 0.96 MG/DL (ref 0.57–1)
CRP SERPL-MCNC: 4.56 MG/DL (ref 0–0.5)
D-LACTATE SERPL-SCNC: 1.4 MMOL/L (ref 0.3–2)
DEPRECATED RDW RBC AUTO: 49 FL (ref 37–54)
EGFRCR SERPLBLD CKD-EPI 2021: 64.6 ML/MIN/1.73
EOSINOPHIL # BLD AUTO: 0.19 10*3/MM3 (ref 0–0.4)
EOSINOPHIL NFR BLD AUTO: 1.6 % (ref 0.3–6.2)
ERYTHROCYTE [DISTWIDTH] IN BLOOD BY AUTOMATED COUNT: 15.3 % (ref 12.3–15.4)
ERYTHROCYTE [SEDIMENTATION RATE] IN BLOOD: 44 MM/HR (ref 0–30)
ETHANOL UR QL: <0.01 %
GEN 5 1HR TROPONIN T REFLEX: 7 NG/L
GLUCOSE SERPL-MCNC: 124 MG/DL (ref 65–99)
GLUCOSE UR STRIP-MCNC: NEGATIVE MG/DL
HCT VFR BLD AUTO: 34 % (ref 34–46.6)
HGB BLD-MCNC: 10.3 G/DL (ref 12–15.9)
HGB UR QL STRIP.AUTO: NEGATIVE
HYALINE CASTS UR QL AUTO: ABNORMAL /LPF
IMM GRANULOCYTES # BLD AUTO: 0.03 10*3/MM3 (ref 0–0.05)
IMM GRANULOCYTES NFR BLD AUTO: 0.3 % (ref 0–0.5)
INR PPP: 1.09 (ref 0.9–1.1)
KETONES UR QL STRIP: NEGATIVE
LEUKOCYTE ESTERASE UR QL STRIP.AUTO: ABNORMAL
LYMPHOCYTES # BLD AUTO: 1.09 10*3/MM3 (ref 0.7–3.1)
LYMPHOCYTES NFR BLD AUTO: 9.2 % (ref 19.6–45.3)
MAGNESIUM SERPL-MCNC: 2.1 MG/DL (ref 1.6–2.4)
MCH RBC QN AUTO: 26 PG (ref 26.6–33)
MCHC RBC AUTO-ENTMCNC: 30.3 G/DL (ref 31.5–35.7)
MCV RBC AUTO: 85.9 FL (ref 79–97)
METHADONE UR QL SCN: NEGATIVE
MONOCYTES # BLD AUTO: 0.64 10*3/MM3 (ref 0.1–0.9)
MONOCYTES NFR BLD AUTO: 5.4 % (ref 5–12)
NEUTROPHILS NFR BLD AUTO: 83.1 % (ref 42.7–76)
NEUTROPHILS NFR BLD AUTO: 9.8 10*3/MM3 (ref 1.7–7)
NITRITE UR QL STRIP: NEGATIVE
NRBC BLD AUTO-RTO: 0 /100 WBC (ref 0–0.2)
NT-PROBNP SERPL-MCNC: 527 PG/ML (ref 0–900)
OPIATES UR QL: NEGATIVE
OXYCODONE UR QL SCN: NEGATIVE
PCP UR QL SCN: NEGATIVE
PH UR STRIP.AUTO: 7 [PH] (ref 5–8)
PLATELET # BLD AUTO: 325 10*3/MM3 (ref 140–450)
PMV BLD AUTO: 8.8 FL (ref 6–12)
POTASSIUM SERPL-SCNC: 3.9 MMOL/L (ref 3.5–5.2)
PROT UR QL STRIP: NEGATIVE
PROTHROMBIN TIME: 14.1 SECONDS (ref 11.7–14.2)
RBC # BLD AUTO: 3.96 10*6/MM3 (ref 3.77–5.28)
RBC # UR STRIP: ABNORMAL /HPF
REF LAB TEST METHOD: ABNORMAL
SODIUM SERPL-SCNC: 135 MMOL/L (ref 136–145)
SP GR UR STRIP: 1.01 (ref 1–1.03)
SQUAMOUS #/AREA URNS HPF: ABNORMAL /HPF
TRICYCLICS UR QL SCN: NEGATIVE
TROPONIN T NUMERIC DELTA: -2 NG/L
TROPONIN T SERPL HS-MCNC: 9 NG/L
TSH SERPL DL<=0.05 MIU/L-ACNC: 2.94 UIU/ML (ref 0.27–4.2)
UROBILINOGEN UR QL STRIP: ABNORMAL
WBC # UR STRIP: ABNORMAL /HPF
WBC NRBC COR # BLD AUTO: 11.8 10*3/MM3 (ref 3.4–10.8)

## 2025-07-31 PROCEDURE — 85652 RBC SED RATE AUTOMATED: CPT | Performed by: OCCUPATIONAL THERAPIST

## 2025-07-31 PROCEDURE — 84443 ASSAY THYROID STIM HORMONE: CPT | Performed by: OCCUPATIONAL THERAPIST

## 2025-07-31 PROCEDURE — 80306 DRUG TEST PRSMV INSTRMNT: CPT | Performed by: OCCUPATIONAL THERAPIST

## 2025-07-31 PROCEDURE — 83880 ASSAY OF NATRIURETIC PEPTIDE: CPT | Performed by: OCCUPATIONAL THERAPIST

## 2025-07-31 PROCEDURE — 36415 COLL VENOUS BLD VENIPUNCTURE: CPT

## 2025-07-31 PROCEDURE — 71045 X-RAY EXAM CHEST 1 VIEW: CPT

## 2025-07-31 PROCEDURE — 85025 COMPLETE CBC W/AUTO DIFF WBC: CPT | Performed by: OCCUPATIONAL THERAPIST

## 2025-07-31 PROCEDURE — 82077 ASSAY SPEC XCP UR&BREATH IA: CPT | Performed by: OCCUPATIONAL THERAPIST

## 2025-07-31 PROCEDURE — 87040 BLOOD CULTURE FOR BACTERIA: CPT | Performed by: OCCUPATIONAL THERAPIST

## 2025-07-31 PROCEDURE — 84484 ASSAY OF TROPONIN QUANT: CPT | Performed by: OCCUPATIONAL THERAPIST

## 2025-07-31 PROCEDURE — 93005 ELECTROCARDIOGRAM TRACING: CPT | Performed by: STUDENT IN AN ORGANIZED HEALTH CARE EDUCATION/TRAINING PROGRAM

## 2025-07-31 PROCEDURE — 93005 ELECTROCARDIOGRAM TRACING: CPT | Performed by: EMERGENCY MEDICINE

## 2025-07-31 PROCEDURE — 99285 EMERGENCY DEPT VISIT HI MDM: CPT

## 2025-07-31 PROCEDURE — 85730 THROMBOPLASTIN TIME PARTIAL: CPT | Performed by: OCCUPATIONAL THERAPIST

## 2025-07-31 PROCEDURE — 80048 BASIC METABOLIC PNL TOTAL CA: CPT | Performed by: OCCUPATIONAL THERAPIST

## 2025-07-31 PROCEDURE — 83605 ASSAY OF LACTIC ACID: CPT

## 2025-07-31 PROCEDURE — 81001 URINALYSIS AUTO W/SCOPE: CPT | Performed by: OCCUPATIONAL THERAPIST

## 2025-07-31 PROCEDURE — 25010000002 CEFEPIME PER 500 MG: Performed by: OCCUPATIONAL THERAPIST

## 2025-07-31 PROCEDURE — 25010000002 HYDRALAZINE PER 20 MG: Performed by: STUDENT IN AN ORGANIZED HEALTH CARE EDUCATION/TRAINING PROGRAM

## 2025-07-31 PROCEDURE — 87086 URINE CULTURE/COLONY COUNT: CPT | Performed by: OCCUPATIONAL THERAPIST

## 2025-07-31 PROCEDURE — 25810000003 LACTATED RINGERS PER 1000 ML: Performed by: STUDENT IN AN ORGANIZED HEALTH CARE EDUCATION/TRAINING PROGRAM

## 2025-07-31 PROCEDURE — 83735 ASSAY OF MAGNESIUM: CPT | Performed by: OCCUPATIONAL THERAPIST

## 2025-07-31 PROCEDURE — 86140 C-REACTIVE PROTEIN: CPT | Performed by: OCCUPATIONAL THERAPIST

## 2025-07-31 PROCEDURE — 85610 PROTHROMBIN TIME: CPT | Performed by: OCCUPATIONAL THERAPIST

## 2025-07-31 RX ORDER — SODIUM CHLORIDE, SODIUM LACTATE, POTASSIUM CHLORIDE, CALCIUM CHLORIDE 600; 310; 30; 20 MG/100ML; MG/100ML; MG/100ML; MG/100ML
75 INJECTION, SOLUTION INTRAVENOUS CONTINUOUS
Status: DISPENSED | OUTPATIENT
Start: 2025-07-31 | End: 2025-08-01

## 2025-07-31 RX ORDER — SODIUM CHLORIDE 0.9 % (FLUSH) 0.9 %
10 SYRINGE (ML) INJECTION AS NEEDED
Status: DISCONTINUED | OUTPATIENT
Start: 2025-07-31 | End: 2025-08-05 | Stop reason: HOSPADM

## 2025-07-31 RX ORDER — HYDROXYZINE HYDROCHLORIDE 25 MG/1
25 TABLET, FILM COATED ORAL ONCE
Status: COMPLETED | OUTPATIENT
Start: 2025-08-01 | End: 2025-07-31

## 2025-07-31 RX ORDER — SODIUM CHLORIDE 0.9 % (FLUSH) 0.9 %
10 SYRINGE (ML) INJECTION EVERY 12 HOURS SCHEDULED
Status: DISCONTINUED | OUTPATIENT
Start: 2025-07-31 | End: 2025-08-05 | Stop reason: HOSPADM

## 2025-07-31 RX ORDER — NITROGLYCERIN 0.4 MG/1
0.4 TABLET SUBLINGUAL
Status: DISCONTINUED | OUTPATIENT
Start: 2025-07-31 | End: 2025-08-03

## 2025-07-31 RX ORDER — VANCOMYCIN 1.75 GRAM/500 ML IN 0.9 % SODIUM CHLORIDE INTRAVENOUS
1750 ONCE
Status: COMPLETED | OUTPATIENT
Start: 2025-07-31 | End: 2025-08-01

## 2025-07-31 RX ORDER — SODIUM CHLORIDE 9 MG/ML
40 INJECTION, SOLUTION INTRAVENOUS AS NEEDED
Status: DISCONTINUED | OUTPATIENT
Start: 2025-07-31 | End: 2025-08-05 | Stop reason: HOSPADM

## 2025-07-31 RX ORDER — HYDRALAZINE HYDROCHLORIDE 20 MG/ML
10 INJECTION INTRAMUSCULAR; INTRAVENOUS EVERY 6 HOURS PRN
Status: DISCONTINUED | OUTPATIENT
Start: 2025-07-31 | End: 2025-08-01

## 2025-07-31 RX ADMIN — CEFEPIME 2000 MG: 2 INJECTION, POWDER, FOR SOLUTION INTRAVENOUS at 21:22

## 2025-07-31 RX ADMIN — SODIUM CHLORIDE, POTASSIUM CHLORIDE, SODIUM LACTATE AND CALCIUM CHLORIDE 75 ML/HR: 600; 310; 30; 20 INJECTION, SOLUTION INTRAVENOUS at 23:16

## 2025-07-31 RX ADMIN — Medication 1750 MG: at 23:16

## 2025-07-31 RX ADMIN — HYDRALAZINE HYDROCHLORIDE 10 MG: 20 INJECTION INTRAMUSCULAR; INTRAVENOUS at 23:19

## 2025-07-31 RX ADMIN — HYDROXYZINE HYDROCHLORIDE 25 MG: 25 TABLET ORAL at 23:54

## 2025-07-31 NOTE — PROGRESS NOTES
Results of the MRI of the pelvis with and without contrast performed on 7/31/2025 at an outside open MRI facility showed findings that suggested septic arthritis of the right sided sacroiliac joint with the adjacent abscess and infectious myositis.  Reviewed the MRI with Dr. Ricardo who recommended the patient to proceed to the ED for further evaluation and treatment with IV antibiotics send urgent orthopedic consult.  Relayed this information to the patient.  The patient had multiple questions about the findings which were answered to the best of my abilities.  The patient reports that she will likely go to the ER as instructed but may not go tonight.  Advised the patient to go promptly and to immediately present with any fever or worsening of symptoms.  Patient verbalized understanding.

## 2025-08-01 ENCOUNTER — TELEPHONE (OUTPATIENT)
Dept: ONCOLOGY | Facility: CLINIC | Age: 70
End: 2025-08-01
Payer: MEDICARE

## 2025-08-01 ENCOUNTER — APPOINTMENT (OUTPATIENT)
Dept: INTERVENTIONAL RADIOLOGY/VASCULAR | Facility: HOSPITAL | Age: 70
DRG: 549 | End: 2025-08-01
Payer: MEDICARE

## 2025-08-01 ENCOUNTER — APPOINTMENT (OUTPATIENT)
Dept: CT IMAGING | Facility: HOSPITAL | Age: 70
DRG: 549 | End: 2025-08-01
Payer: MEDICARE

## 2025-08-01 LAB
ABO GROUP BLD: NORMAL
ALBUMIN SERPL-MCNC: 3.9 G/DL (ref 3.5–5.2)
ALBUMIN/GLOB SERPL: 1.3 G/DL
ALP SERPL-CCNC: 69 U/L (ref 39–117)
ALT SERPL W P-5'-P-CCNC: 11 U/L (ref 1–33)
ANION GAP SERPL CALCULATED.3IONS-SCNC: 10.3 MMOL/L (ref 5–15)
APTT PPP: 34.1 SECONDS (ref 22.7–35.4)
AST SERPL-CCNC: 15 U/L (ref 1–32)
BASOPHILS # BLD AUTO: 0.04 10*3/MM3 (ref 0–0.2)
BASOPHILS NFR BLD AUTO: 0.5 % (ref 0–1.5)
BILIRUB SERPL-MCNC: 0.3 MG/DL (ref 0–1.2)
BLD GP AB SCN SERPL QL: NEGATIVE
BUN SERPL-MCNC: 16.1 MG/DL (ref 8–23)
BUN/CREAT SERPL: 20.6 (ref 7–25)
CALCIUM SPEC-SCNC: 9 MG/DL (ref 8.6–10.5)
CHLORIDE SERPL-SCNC: 104 MMOL/L (ref 98–107)
CO2 SERPL-SCNC: 24.7 MMOL/L (ref 22–29)
CREAT SERPL-MCNC: 0.78 MG/DL (ref 0.57–1)
DEPRECATED RDW RBC AUTO: 48 FL (ref 37–54)
EGFRCR SERPLBLD CKD-EPI 2021: 82.9 ML/MIN/1.73
EOSINOPHIL # BLD AUTO: 0.23 10*3/MM3 (ref 0–0.4)
EOSINOPHIL NFR BLD AUTO: 2.9 % (ref 0.3–6.2)
ERYTHROCYTE [DISTWIDTH] IN BLOOD BY AUTOMATED COUNT: 15.4 % (ref 12.3–15.4)
GLOBULIN UR ELPH-MCNC: 3 GM/DL
GLUCOSE SERPL-MCNC: 126 MG/DL (ref 65–99)
HCT VFR BLD AUTO: 33.7 % (ref 34–46.6)
HGB BLD-MCNC: 10.3 G/DL (ref 12–15.9)
IMM GRANULOCYTES # BLD AUTO: 0.03 10*3/MM3 (ref 0–0.05)
IMM GRANULOCYTES NFR BLD AUTO: 0.4 % (ref 0–0.5)
INR PPP: 1.16 (ref 0.9–1.1)
LYMPHOCYTES # BLD AUTO: 1.08 10*3/MM3 (ref 0.7–3.1)
LYMPHOCYTES NFR BLD AUTO: 13.4 % (ref 19.6–45.3)
MAGNESIUM SERPL-MCNC: 2.2 MG/DL (ref 1.6–2.4)
MCH RBC QN AUTO: 25.9 PG (ref 26.6–33)
MCHC RBC AUTO-ENTMCNC: 30.6 G/DL (ref 31.5–35.7)
MCV RBC AUTO: 84.9 FL (ref 79–97)
MONOCYTES # BLD AUTO: 0.56 10*3/MM3 (ref 0.1–0.9)
MONOCYTES NFR BLD AUTO: 7 % (ref 5–12)
NEUTROPHILS NFR BLD AUTO: 6.11 10*3/MM3 (ref 1.7–7)
NEUTROPHILS NFR BLD AUTO: 75.8 % (ref 42.7–76)
NRBC BLD AUTO-RTO: 0 /100 WBC (ref 0–0.2)
PHOSPHATE SERPL-MCNC: 4 MG/DL (ref 2.5–4.5)
PLATELET # BLD AUTO: 282 10*3/MM3 (ref 140–450)
PMV BLD AUTO: 8.9 FL (ref 6–12)
POTASSIUM SERPL-SCNC: 3.6 MMOL/L (ref 3.5–5.2)
POTASSIUM SERPL-SCNC: 4.4 MMOL/L (ref 3.5–5.2)
PROT SERPL-MCNC: 6.9 G/DL (ref 6–8.5)
PROTHROMBIN TIME: 14.7 SECONDS (ref 11.7–14.2)
QT INTERVAL: 406 MS
QTC INTERVAL: 494 MS
RBC # BLD AUTO: 3.97 10*6/MM3 (ref 3.77–5.28)
RH BLD: NEGATIVE
SODIUM SERPL-SCNC: 139 MMOL/L (ref 136–145)
T&S EXPIRATION DATE: NORMAL
WBC NRBC COR # BLD AUTO: 8.05 10*3/MM3 (ref 3.4–10.8)

## 2025-08-01 PROCEDURE — 25010000002 LIDOCAINE 1 % SOLUTION: Performed by: RADIOLOGY

## 2025-08-01 PROCEDURE — 25010000002 VANCOMYCIN 1 G RECONSTITUTED SOLUTION 1 EACH VIAL: Performed by: STUDENT IN AN ORGANIZED HEALTH CARE EDUCATION/TRAINING PROGRAM

## 2025-08-01 PROCEDURE — 85730 THROMBOPLASTIN TIME PARTIAL: CPT | Performed by: STUDENT IN AN ORGANIZED HEALTH CARE EDUCATION/TRAINING PROGRAM

## 2025-08-01 PROCEDURE — 25010000002 FENTANYL CITRATE (PF) 50 MCG/ML SOLUTION: Performed by: RADIOLOGY

## 2025-08-01 PROCEDURE — 77012 CT SCAN FOR NEEDLE BIOPSY: CPT

## 2025-08-01 PROCEDURE — 86901 BLOOD TYPING SEROLOGIC RH(D): CPT

## 2025-08-01 PROCEDURE — 25010000002 ONDANSETRON PER 1 MG: Performed by: RADIOLOGY

## 2025-08-01 PROCEDURE — 25810000003 SODIUM CHLORIDE 0.9 % SOLUTION 250 ML FLEX CONT: Performed by: STUDENT IN AN ORGANIZED HEALTH CARE EDUCATION/TRAINING PROGRAM

## 2025-08-01 PROCEDURE — 87205 SMEAR GRAM STAIN: CPT | Performed by: INTERNAL MEDICINE

## 2025-08-01 PROCEDURE — 83735 ASSAY OF MAGNESIUM: CPT | Performed by: STUDENT IN AN ORGANIZED HEALTH CARE EDUCATION/TRAINING PROGRAM

## 2025-08-01 PROCEDURE — 86900 BLOOD TYPING SEROLOGIC ABO: CPT

## 2025-08-01 PROCEDURE — C1819 TISSUE LOCALIZATION-EXCISION: HCPCS

## 2025-08-01 PROCEDURE — 0J9C3ZX DRAINAGE OF PELVIC REGION SUBCUTANEOUS TISSUE AND FASCIA, PERCUTANEOUS APPROACH, DIAGNOSTIC: ICD-10-PCS | Performed by: RADIOLOGY

## 2025-08-01 PROCEDURE — 80053 COMPREHEN METABOLIC PANEL: CPT | Performed by: STUDENT IN AN ORGANIZED HEALTH CARE EDUCATION/TRAINING PROGRAM

## 2025-08-01 PROCEDURE — 84132 ASSAY OF SERUM POTASSIUM: CPT

## 2025-08-01 PROCEDURE — 87070 CULTURE OTHR SPECIMN AEROBIC: CPT | Performed by: INTERNAL MEDICINE

## 2025-08-01 PROCEDURE — 86850 RBC ANTIBODY SCREEN: CPT | Performed by: STUDENT IN AN ORGANIZED HEALTH CARE EDUCATION/TRAINING PROGRAM

## 2025-08-01 PROCEDURE — 84100 ASSAY OF PHOSPHORUS: CPT | Performed by: STUDENT IN AN ORGANIZED HEALTH CARE EDUCATION/TRAINING PROGRAM

## 2025-08-01 PROCEDURE — 85025 COMPLETE CBC W/AUTO DIFF WBC: CPT | Performed by: STUDENT IN AN ORGANIZED HEALTH CARE EDUCATION/TRAINING PROGRAM

## 2025-08-01 PROCEDURE — 25010000002 ENOXAPARIN PER 10 MG

## 2025-08-01 PROCEDURE — 85610 PROTHROMBIN TIME: CPT | Performed by: STUDENT IN AN ORGANIZED HEALTH CARE EDUCATION/TRAINING PROGRAM

## 2025-08-01 PROCEDURE — 25010000002 CEFEPIME PER 500 MG: Performed by: STUDENT IN AN ORGANIZED HEALTH CARE EDUCATION/TRAINING PROGRAM

## 2025-08-01 PROCEDURE — 86900 BLOOD TYPING SEROLOGIC ABO: CPT | Performed by: STUDENT IN AN ORGANIZED HEALTH CARE EDUCATION/TRAINING PROGRAM

## 2025-08-01 PROCEDURE — 86901 BLOOD TYPING SEROLOGIC RH(D): CPT | Performed by: STUDENT IN AN ORGANIZED HEALTH CARE EDUCATION/TRAINING PROGRAM

## 2025-08-01 RX ORDER — ENOXAPARIN SODIUM 100 MG/ML
40 INJECTION SUBCUTANEOUS EVERY 24 HOURS
Status: DISCONTINUED | OUTPATIENT
Start: 2025-08-01 | End: 2025-08-05 | Stop reason: HOSPADM

## 2025-08-01 RX ORDER — LABETALOL HYDROCHLORIDE 5 MG/ML
10 INJECTION, SOLUTION INTRAVENOUS EVERY 6 HOURS PRN
Status: COMPLETED | OUTPATIENT
Start: 2025-08-01 | End: 2025-08-02

## 2025-08-01 RX ORDER — ONDANSETRON 2 MG/ML
INJECTION INTRAMUSCULAR; INTRAVENOUS AS NEEDED
Status: COMPLETED | OUTPATIENT
Start: 2025-08-01 | End: 2025-08-01

## 2025-08-01 RX ORDER — LIDOCAINE HYDROCHLORIDE 10 MG/ML
INJECTION, SOLUTION INFILTRATION; PERINEURAL AS NEEDED
Status: COMPLETED | OUTPATIENT
Start: 2025-08-01 | End: 2025-08-01

## 2025-08-01 RX ORDER — POTASSIUM CHLORIDE 1500 MG/1
40 TABLET, EXTENDED RELEASE ORAL EVERY 4 HOURS
Status: COMPLETED | OUTPATIENT
Start: 2025-08-01 | End: 2025-08-01

## 2025-08-01 RX ORDER — AMLODIPINE BESYLATE 5 MG/1
5 TABLET ORAL
Status: DISCONTINUED | OUTPATIENT
Start: 2025-08-01 | End: 2025-08-02

## 2025-08-01 RX ORDER — FENTANYL CITRATE 50 UG/ML
INJECTION, SOLUTION INTRAMUSCULAR; INTRAVENOUS AS NEEDED
Status: COMPLETED | OUTPATIENT
Start: 2025-08-01 | End: 2025-08-01

## 2025-08-01 RX ORDER — HYDROXYZINE HYDROCHLORIDE 25 MG/1
25 TABLET, FILM COATED ORAL 3 TIMES DAILY PRN
Status: DISCONTINUED | OUTPATIENT
Start: 2025-08-01 | End: 2025-08-05 | Stop reason: HOSPADM

## 2025-08-01 RX ADMIN — FENTANYL CITRATE 50 MCG: 50 INJECTION, SOLUTION INTRAMUSCULAR; INTRAVENOUS at 14:23

## 2025-08-01 RX ADMIN — POTASSIUM CHLORIDE 40 MEQ: 1500 TABLET, EXTENDED RELEASE ORAL at 08:45

## 2025-08-01 RX ADMIN — CEFEPIME 2000 MG: 2 INJECTION, POWDER, FOR SOLUTION INTRAVENOUS at 10:02

## 2025-08-01 RX ADMIN — VANCOMYCIN HYDROCHLORIDE 1000 MG: 1 INJECTION, POWDER, LYOPHILIZED, FOR SOLUTION INTRAVENOUS at 10:46

## 2025-08-01 RX ADMIN — ONDANSETRON 4 MG: 2 INJECTION INTRAMUSCULAR; INTRAVENOUS at 14:16

## 2025-08-01 RX ADMIN — Medication 10 ML: at 08:45

## 2025-08-01 RX ADMIN — Medication 10 MG: at 06:26

## 2025-08-01 RX ADMIN — CEFEPIME 2000 MG: 2 INJECTION, POWDER, FOR SOLUTION INTRAVENOUS at 17:20

## 2025-08-01 RX ADMIN — FENTANYL CITRATE 50 MCG: 50 INJECTION, SOLUTION INTRAMUSCULAR; INTRAVENOUS at 14:19

## 2025-08-01 RX ADMIN — LIDOCAINE HYDROCHLORIDE 7 ML: 10 INJECTION, SOLUTION INFILTRATION; PERINEURAL at 14:28

## 2025-08-01 RX ADMIN — VANCOMYCIN HYDROCHLORIDE 1000 MG: 1 INJECTION, POWDER, LYOPHILIZED, FOR SOLUTION INTRAVENOUS at 23:18

## 2025-08-01 RX ADMIN — Medication 10 ML: at 23:20

## 2025-08-01 RX ADMIN — AMLODIPINE BESYLATE 5 MG: 5 TABLET ORAL at 08:45

## 2025-08-01 RX ADMIN — POTASSIUM CHLORIDE 40 MEQ: 1500 TABLET, EXTENDED RELEASE ORAL at 05:34

## 2025-08-01 RX ADMIN — ENOXAPARIN SODIUM 40 MG: 100 INJECTION SUBCUTANEOUS at 15:42

## 2025-08-01 RX ADMIN — FENTANYL CITRATE 50 MCG: 50 INJECTION, SOLUTION INTRAMUSCULAR; INTRAVENOUS at 14:27

## 2025-08-01 NOTE — TELEPHONE ENCOUNTER
TRIED TO W/T NO ANSWER     Caller: Marilyn Pineda    Relationship: Self    Best call back number:   Telephone Information:   Mobile [phone]       What was the call regarding: PATIENT NEEDS FOR DR. MEJIA TO EMAILTHE MRI REPORT DONE BY [facility] ON 7/31/2025, THEY SEE THE IMAGES PUT NOT THE REPORT.    PLEASE SEND ASAP THE [facility] DR NEEDS IT.    EMAIL [email or web address]

## 2025-08-02 LAB
ALBUMIN SERPL-MCNC: 3.6 G/DL (ref 3.5–5.2)
ALBUMIN/GLOB SERPL: 1.2 G/DL
ALP SERPL-CCNC: 66 U/L (ref 39–117)
ALT SERPL W P-5'-P-CCNC: 10 U/L (ref 1–33)
ANION GAP SERPL CALCULATED.3IONS-SCNC: 12.2 MMOL/L (ref 5–15)
AST SERPL-CCNC: 15 U/L (ref 1–32)
BACTERIA SPEC AEROBE CULT: NORMAL
BASOPHILS # BLD AUTO: 0.04 10*3/MM3 (ref 0–0.2)
BASOPHILS NFR BLD AUTO: 0.6 % (ref 0–1.5)
BILIRUB SERPL-MCNC: 0.3 MG/DL (ref 0–1.2)
BUN SERPL-MCNC: 17.5 MG/DL (ref 8–23)
BUN/CREAT SERPL: 21.3 (ref 7–25)
CALCIUM SPEC-SCNC: 8.6 MG/DL (ref 8.6–10.5)
CHLORIDE SERPL-SCNC: 107 MMOL/L (ref 98–107)
CO2 SERPL-SCNC: 22.8 MMOL/L (ref 22–29)
CREAT SERPL-MCNC: 0.82 MG/DL (ref 0.57–1)
DEPRECATED RDW RBC AUTO: 50.6 FL (ref 37–54)
EGFRCR SERPLBLD CKD-EPI 2021: 78 ML/MIN/1.73
EOSINOPHIL # BLD AUTO: 0.37 10*3/MM3 (ref 0–0.4)
EOSINOPHIL NFR BLD AUTO: 6 % (ref 0.3–6.2)
ERYTHROCYTE [DISTWIDTH] IN BLOOD BY AUTOMATED COUNT: 15.7 % (ref 12.3–15.4)
GLOBULIN UR ELPH-MCNC: 2.9 GM/DL
GLUCOSE SERPL-MCNC: 101 MG/DL (ref 65–99)
HCT VFR BLD AUTO: 35.5 % (ref 34–46.6)
HGB BLD-MCNC: 10.6 G/DL (ref 12–15.9)
IMM GRANULOCYTES # BLD AUTO: 0.02 10*3/MM3 (ref 0–0.05)
IMM GRANULOCYTES NFR BLD AUTO: 0.3 % (ref 0–0.5)
LYMPHOCYTES # BLD AUTO: 1.13 10*3/MM3 (ref 0.7–3.1)
LYMPHOCYTES NFR BLD AUTO: 18.3 % (ref 19.6–45.3)
MAGNESIUM SERPL-MCNC: 2.2 MG/DL (ref 1.6–2.4)
MCH RBC QN AUTO: 26.3 PG (ref 26.6–33)
MCHC RBC AUTO-ENTMCNC: 29.9 G/DL (ref 31.5–35.7)
MCV RBC AUTO: 88.1 FL (ref 79–97)
MONOCYTES # BLD AUTO: 0.48 10*3/MM3 (ref 0.1–0.9)
MONOCYTES NFR BLD AUTO: 7.8 % (ref 5–12)
NEUTROPHILS NFR BLD AUTO: 4.15 10*3/MM3 (ref 1.7–7)
NEUTROPHILS NFR BLD AUTO: 67 % (ref 42.7–76)
NRBC BLD AUTO-RTO: 0 /100 WBC (ref 0–0.2)
PHOSPHATE SERPL-MCNC: 5 MG/DL (ref 2.5–4.5)
PLATELET # BLD AUTO: 287 10*3/MM3 (ref 140–450)
PMV BLD AUTO: 8.7 FL (ref 6–12)
POTASSIUM SERPL-SCNC: 4.2 MMOL/L (ref 3.5–5.2)
PROT SERPL-MCNC: 6.5 G/DL (ref 6–8.5)
RBC # BLD AUTO: 4.03 10*6/MM3 (ref 3.77–5.28)
SODIUM SERPL-SCNC: 142 MMOL/L (ref 136–145)
VANCOMYCIN TROUGH SERPL-MCNC: 19.3 MCG/ML (ref 5–20)
WBC NRBC COR # BLD AUTO: 6.19 10*3/MM3 (ref 3.4–10.8)

## 2025-08-02 PROCEDURE — 25810000003 SODIUM CHLORIDE 0.9 % SOLUTION 250 ML FLEX CONT: Performed by: STUDENT IN AN ORGANIZED HEALTH CARE EDUCATION/TRAINING PROGRAM

## 2025-08-02 PROCEDURE — 83735 ASSAY OF MAGNESIUM: CPT

## 2025-08-02 PROCEDURE — 25010000002 ENOXAPARIN PER 10 MG

## 2025-08-02 PROCEDURE — 85025 COMPLETE CBC W/AUTO DIFF WBC: CPT | Performed by: INTERNAL MEDICINE

## 2025-08-02 PROCEDURE — 84100 ASSAY OF PHOSPHORUS: CPT

## 2025-08-02 PROCEDURE — 80053 COMPREHEN METABOLIC PANEL: CPT | Performed by: INTERNAL MEDICINE

## 2025-08-02 PROCEDURE — 25010000002 VANCOMYCIN 1 G RECONSTITUTED SOLUTION 1 EACH VIAL: Performed by: STUDENT IN AN ORGANIZED HEALTH CARE EDUCATION/TRAINING PROGRAM

## 2025-08-02 PROCEDURE — 80202 ASSAY OF VANCOMYCIN: CPT | Performed by: STUDENT IN AN ORGANIZED HEALTH CARE EDUCATION/TRAINING PROGRAM

## 2025-08-02 PROCEDURE — 25010000002 CEFEPIME PER 500 MG: Performed by: STUDENT IN AN ORGANIZED HEALTH CARE EDUCATION/TRAINING PROGRAM

## 2025-08-02 PROCEDURE — 63710000001 DIPHENHYDRAMINE PER 50 MG

## 2025-08-02 PROCEDURE — 25010000002 CEFEPIME PER 500 MG

## 2025-08-02 RX ORDER — CAPSAICIN 0.07 G/100G
1 CREAM TOPICAL AS NEEDED
Status: DISCONTINUED | OUTPATIENT
Start: 2025-08-02 | End: 2025-08-02 | Stop reason: ALTCHOICE

## 2025-08-02 RX ORDER — AMLODIPINE BESYLATE 5 MG/1
10 TABLET ORAL
Status: DISCONTINUED | OUTPATIENT
Start: 2025-08-03 | End: 2025-08-05 | Stop reason: HOSPADM

## 2025-08-02 RX ORDER — DIPHENHYDRAMINE HCL 25 MG
25 CAPSULE ORAL ONCE
Status: COMPLETED | OUTPATIENT
Start: 2025-08-02 | End: 2025-08-02

## 2025-08-02 RX ORDER — BENZOCAINE/MENTHOL 6 MG-10 MG
1 LOZENGE MUCOUS MEMBRANE EVERY 6 HOURS PRN
Status: DISCONTINUED | OUTPATIENT
Start: 2025-08-02 | End: 2025-08-05 | Stop reason: HOSPADM

## 2025-08-02 RX ADMIN — AMLODIPINE BESYLATE 5 MG: 5 TABLET ORAL at 10:14

## 2025-08-02 RX ADMIN — CEFEPIME 2000 MG: 2 INJECTION, POWDER, FOR SOLUTION INTRAVENOUS at 18:47

## 2025-08-02 RX ADMIN — Medication 10 ML: at 12:57

## 2025-08-02 RX ADMIN — DIPHENHYDRAMINE HYDROCHLORIDE 25 MG: 25 CAPSULE ORAL at 10:14

## 2025-08-02 RX ADMIN — VANCOMYCIN HYDROCHLORIDE 1000 MG: 1 INJECTION, POWDER, LYOPHILIZED, FOR SOLUTION INTRAVENOUS at 12:57

## 2025-08-02 RX ADMIN — CEFEPIME 2000 MG: 2 INJECTION, POWDER, FOR SOLUTION INTRAVENOUS at 10:14

## 2025-08-02 RX ADMIN — HYDROXYZINE HYDROCHLORIDE 25 MG: 25 TABLET ORAL at 18:47

## 2025-08-02 RX ADMIN — ENOXAPARIN SODIUM 40 MG: 100 INJECTION SUBCUTANEOUS at 15:11

## 2025-08-02 RX ADMIN — Medication 10 ML: at 22:43

## 2025-08-02 RX ADMIN — CEFEPIME 2000 MG: 2 INJECTION, POWDER, FOR SOLUTION INTRAVENOUS at 03:25

## 2025-08-02 RX ADMIN — Medication 10 MG: at 17:00

## 2025-08-03 LAB
ALBUMIN SERPL-MCNC: 3.9 G/DL (ref 3.5–5.2)
ALBUMIN/GLOB SERPL: 1.1 G/DL
ALP SERPL-CCNC: 73 U/L (ref 39–117)
ALT SERPL W P-5'-P-CCNC: 10 U/L (ref 1–33)
ANION GAP SERPL CALCULATED.3IONS-SCNC: 11.8 MMOL/L (ref 5–15)
AST SERPL-CCNC: 15 U/L (ref 1–32)
BASOPHILS # BLD AUTO: 0.04 10*3/MM3 (ref 0–0.2)
BASOPHILS NFR BLD AUTO: 0.6 % (ref 0–1.5)
BILIRUB SERPL-MCNC: 0.3 MG/DL (ref 0–1.2)
BUN SERPL-MCNC: 17.4 MG/DL (ref 8–23)
BUN/CREAT SERPL: 20 (ref 7–25)
CALCIUM SPEC-SCNC: 9.3 MG/DL (ref 8.6–10.5)
CHLORIDE SERPL-SCNC: 104 MMOL/L (ref 98–107)
CO2 SERPL-SCNC: 23.2 MMOL/L (ref 22–29)
CREAT SERPL-MCNC: 0.87 MG/DL (ref 0.57–1)
DEPRECATED RDW RBC AUTO: 49 FL (ref 37–54)
EGFRCR SERPLBLD CKD-EPI 2021: 72.7 ML/MIN/1.73
EOSINOPHIL # BLD AUTO: 0.45 10*3/MM3 (ref 0–0.4)
EOSINOPHIL NFR BLD AUTO: 6.6 % (ref 0.3–6.2)
ERYTHROCYTE [DISTWIDTH] IN BLOOD BY AUTOMATED COUNT: 15.3 % (ref 12.3–15.4)
GLOBULIN UR ELPH-MCNC: 3.4 GM/DL
GLUCOSE SERPL-MCNC: 94 MG/DL (ref 65–99)
HCT VFR BLD AUTO: 35 % (ref 34–46.6)
HGB BLD-MCNC: 10.4 G/DL (ref 12–15.9)
IMM GRANULOCYTES # BLD AUTO: 0.02 10*3/MM3 (ref 0–0.05)
IMM GRANULOCYTES NFR BLD AUTO: 0.3 % (ref 0–0.5)
LYMPHOCYTES # BLD AUTO: 1.47 10*3/MM3 (ref 0.7–3.1)
LYMPHOCYTES NFR BLD AUTO: 21.7 % (ref 19.6–45.3)
MCH RBC QN AUTO: 25.9 PG (ref 26.6–33)
MCHC RBC AUTO-ENTMCNC: 29.7 G/DL (ref 31.5–35.7)
MCV RBC AUTO: 87.1 FL (ref 79–97)
MONOCYTES # BLD AUTO: 0.46 10*3/MM3 (ref 0.1–0.9)
MONOCYTES NFR BLD AUTO: 6.8 % (ref 5–12)
NEUTROPHILS NFR BLD AUTO: 4.33 10*3/MM3 (ref 1.7–7)
NEUTROPHILS NFR BLD AUTO: 64 % (ref 42.7–76)
NRBC BLD AUTO-RTO: 0 /100 WBC (ref 0–0.2)
PLATELET # BLD AUTO: 322 10*3/MM3 (ref 140–450)
PMV BLD AUTO: 8.9 FL (ref 6–12)
POTASSIUM SERPL-SCNC: 3.9 MMOL/L (ref 3.5–5.2)
PROT SERPL-MCNC: 7.3 G/DL (ref 6–8.5)
RBC # BLD AUTO: 4.02 10*6/MM3 (ref 3.77–5.28)
SODIUM SERPL-SCNC: 139 MMOL/L (ref 136–145)
WBC NRBC COR # BLD AUTO: 6.77 10*3/MM3 (ref 3.4–10.8)

## 2025-08-03 PROCEDURE — 25010000002 ENOXAPARIN PER 10 MG

## 2025-08-03 PROCEDURE — 25010000002 VANCOMYCIN 750 MG RECONSTITUTED SOLUTION 1 EACH VIAL

## 2025-08-03 PROCEDURE — 85025 COMPLETE CBC W/AUTO DIFF WBC: CPT | Performed by: INTERNAL MEDICINE

## 2025-08-03 PROCEDURE — 80053 COMPREHEN METABOLIC PANEL: CPT | Performed by: INTERNAL MEDICINE

## 2025-08-03 PROCEDURE — 25810000003 SODIUM CHLORIDE 0.9 % SOLUTION 250 ML FLEX CONT

## 2025-08-03 PROCEDURE — 25010000002 CEFEPIME PER 500 MG

## 2025-08-03 PROCEDURE — 25010000002 HYDRALAZINE PER 20 MG: Performed by: STUDENT IN AN ORGANIZED HEALTH CARE EDUCATION/TRAINING PROGRAM

## 2025-08-03 RX ORDER — HYDRALAZINE HYDROCHLORIDE 20 MG/ML
20 INJECTION INTRAMUSCULAR; INTRAVENOUS EVERY 6 HOURS PRN
Status: DISCONTINUED | OUTPATIENT
Start: 2025-08-03 | End: 2025-08-05 | Stop reason: HOSPADM

## 2025-08-03 RX ADMIN — SODIUM CHLORIDE 750 MG: 9 INJECTION, SOLUTION INTRAVENOUS at 16:00

## 2025-08-03 RX ADMIN — CEFEPIME 2000 MG: 2 INJECTION, POWDER, FOR SOLUTION INTRAVENOUS at 11:03

## 2025-08-03 RX ADMIN — SODIUM CHLORIDE 750 MG: 9 INJECTION, SOLUTION INTRAVENOUS at 02:52

## 2025-08-03 RX ADMIN — AMLODIPINE BESYLATE 10 MG: 5 TABLET ORAL at 09:26

## 2025-08-03 RX ADMIN — HYDRALAZINE HYDROCHLORIDE 20 MG: 20 INJECTION INTRAMUSCULAR; INTRAVENOUS at 00:31

## 2025-08-03 RX ADMIN — CEFEPIME 2000 MG: 2 INJECTION, POWDER, FOR SOLUTION INTRAVENOUS at 18:14

## 2025-08-03 RX ADMIN — CEFEPIME 2000 MG: 2 INJECTION, POWDER, FOR SOLUTION INTRAVENOUS at 02:08

## 2025-08-03 RX ADMIN — ENOXAPARIN SODIUM 40 MG: 100 INJECTION SUBCUTANEOUS at 16:04

## 2025-08-03 RX ADMIN — Medication 10 ML: at 09:26

## 2025-08-04 ENCOUNTER — TELEPHONE (OUTPATIENT)
Dept: PHYSICAL THERAPY | Facility: CLINIC | Age: 70
End: 2025-08-04

## 2025-08-04 PROBLEM — M00.88: Status: ACTIVE | Noted: 2025-08-04

## 2025-08-04 LAB
ANION GAP SERPL CALCULATED.3IONS-SCNC: 14 MMOL/L (ref 5–15)
BACTERIA FLD CULT: NORMAL
BASOPHILS # BLD AUTO: 0.05 10*3/MM3 (ref 0–0.2)
BASOPHILS NFR BLD AUTO: 0.7 % (ref 0–1.5)
BUN SERPL-MCNC: 15.5 MG/DL (ref 8–23)
BUN/CREAT SERPL: 19.6 (ref 7–25)
CALCIUM SPEC-SCNC: 9.1 MG/DL (ref 8.6–10.5)
CHLORIDE SERPL-SCNC: 103 MMOL/L (ref 98–107)
CK SERPL-CCNC: 46 U/L (ref 20–180)
CO2 SERPL-SCNC: 22 MMOL/L (ref 22–29)
CREAT SERPL-MCNC: 0.79 MG/DL (ref 0.57–1)
DEPRECATED RDW RBC AUTO: 50.4 FL (ref 37–54)
EGFRCR SERPLBLD CKD-EPI 2021: 81.6 ML/MIN/1.73
EOSINOPHIL # BLD AUTO: 0.47 10*3/MM3 (ref 0–0.4)
EOSINOPHIL NFR BLD AUTO: 6.4 % (ref 0.3–6.2)
ERYTHROCYTE [DISTWIDTH] IN BLOOD BY AUTOMATED COUNT: 15.5 % (ref 12.3–15.4)
GLUCOSE SERPL-MCNC: 103 MG/DL (ref 65–99)
GRAM STN SPEC: NORMAL
GRAM STN SPEC: NORMAL
HCT VFR BLD AUTO: 39.9 % (ref 34–46.6)
HGB BLD-MCNC: 11.7 G/DL (ref 12–15.9)
IMM GRANULOCYTES # BLD AUTO: 0.01 10*3/MM3 (ref 0–0.05)
IMM GRANULOCYTES NFR BLD AUTO: 0.1 % (ref 0–0.5)
LYMPHOCYTES # BLD AUTO: 1.51 10*3/MM3 (ref 0.7–3.1)
LYMPHOCYTES NFR BLD AUTO: 20.6 % (ref 19.6–45.3)
MCH RBC QN AUTO: 26.1 PG (ref 26.6–33)
MCHC RBC AUTO-ENTMCNC: 29.3 G/DL (ref 31.5–35.7)
MCV RBC AUTO: 89.1 FL (ref 79–97)
MONOCYTES # BLD AUTO: 0.57 10*3/MM3 (ref 0.1–0.9)
MONOCYTES NFR BLD AUTO: 7.8 % (ref 5–12)
NEUTROPHILS NFR BLD AUTO: 4.72 10*3/MM3 (ref 1.7–7)
NEUTROPHILS NFR BLD AUTO: 64.4 % (ref 42.7–76)
NRBC BLD AUTO-RTO: 0 /100 WBC (ref 0–0.2)
PLATELET # BLD AUTO: 323 10*3/MM3 (ref 140–450)
PMV BLD AUTO: 8.6 FL (ref 6–12)
POTASSIUM SERPL-SCNC: 3.8 MMOL/L (ref 3.5–5.2)
RBC # BLD AUTO: 4.48 10*6/MM3 (ref 3.77–5.28)
SODIUM SERPL-SCNC: 139 MMOL/L (ref 136–145)
VANCOMYCIN PEAK SERPL-MCNC: 15.4 MCG/ML (ref 20–40)
VANCOMYCIN TROUGH SERPL-MCNC: 15.3 MCG/ML (ref 5–20)
WBC NRBC COR # BLD AUTO: 7.33 10*3/MM3 (ref 3.4–10.8)

## 2025-08-04 PROCEDURE — 80202 ASSAY OF VANCOMYCIN: CPT

## 2025-08-04 PROCEDURE — 82550 ASSAY OF CK (CPK): CPT | Performed by: NURSE PRACTITIONER

## 2025-08-04 PROCEDURE — 25010000002 VANCOMYCIN 750 MG RECONSTITUTED SOLUTION 1 EACH VIAL

## 2025-08-04 PROCEDURE — 25010000002 ENOXAPARIN PER 10 MG

## 2025-08-04 PROCEDURE — 25010000002 HYDRALAZINE PER 20 MG: Performed by: STUDENT IN AN ORGANIZED HEALTH CARE EDUCATION/TRAINING PROGRAM

## 2025-08-04 PROCEDURE — 80048 BASIC METABOLIC PNL TOTAL CA: CPT | Performed by: NURSE PRACTITIONER

## 2025-08-04 PROCEDURE — 85025 COMPLETE CBC W/AUTO DIFF WBC: CPT | Performed by: NURSE PRACTITIONER

## 2025-08-04 PROCEDURE — C1751 CATH, INF, PER/CENT/MIDLINE: HCPCS

## 2025-08-04 PROCEDURE — 25810000003 SODIUM CHLORIDE 0.9 % SOLUTION 250 ML FLEX CONT

## 2025-08-04 PROCEDURE — 25010000002 CEFTRIAXONE PER 250 MG: Performed by: NURSE PRACTITIONER

## 2025-08-04 PROCEDURE — 25010000002 DAPTOMYCIN PER 1 MG: Performed by: NURSE PRACTITIONER

## 2025-08-04 PROCEDURE — 25010000002 CEFEPIME PER 500 MG

## 2025-08-04 PROCEDURE — 02HV33Z INSERTION OF INFUSION DEVICE INTO SUPERIOR VENA CAVA, PERCUTANEOUS APPROACH: ICD-10-PCS

## 2025-08-04 RX ORDER — SODIUM CHLORIDE 0.9 % (FLUSH) 0.9 %
10 SYRINGE (ML) INJECTION AS NEEDED
Status: DISCONTINUED | OUTPATIENT
Start: 2025-08-04 | End: 2025-08-05 | Stop reason: HOSPADM

## 2025-08-04 RX ORDER — SODIUM CHLORIDE 0.9 % (FLUSH) 0.9 %
10 SYRINGE (ML) INJECTION EVERY 12 HOURS SCHEDULED
Status: DISCONTINUED | OUTPATIENT
Start: 2025-08-04 | End: 2025-08-05 | Stop reason: HOSPADM

## 2025-08-04 RX ORDER — SODIUM CHLORIDE 9 MG/ML
40 INJECTION, SOLUTION INTRAVENOUS AS NEEDED
Status: DISCONTINUED | OUTPATIENT
Start: 2025-08-04 | End: 2025-08-05 | Stop reason: HOSPADM

## 2025-08-04 RX ORDER — SODIUM CHLORIDE 0.9 % (FLUSH) 0.9 %
20 SYRINGE (ML) INJECTION AS NEEDED
Status: DISCONTINUED | OUTPATIENT
Start: 2025-08-04 | End: 2025-08-05 | Stop reason: HOSPADM

## 2025-08-04 RX ADMIN — Medication 10 ML: at 20:14

## 2025-08-04 RX ADMIN — CEFTRIAXONE 2000 MG: 2 INJECTION, POWDER, FOR SOLUTION INTRAMUSCULAR; INTRAVENOUS at 15:13

## 2025-08-04 RX ADMIN — HYDRALAZINE HYDROCHLORIDE 20 MG: 20 INJECTION INTRAMUSCULAR; INTRAVENOUS at 01:12

## 2025-08-04 RX ADMIN — CEFEPIME 2000 MG: 2 INJECTION, POWDER, FOR SOLUTION INTRAVENOUS at 10:00

## 2025-08-04 RX ADMIN — Medication 10 ML: at 10:00

## 2025-08-04 RX ADMIN — SODIUM CHLORIDE 750 MG: 9 INJECTION, SOLUTION INTRAVENOUS at 01:41

## 2025-08-04 RX ADMIN — AMLODIPINE BESYLATE 10 MG: 5 TABLET ORAL at 10:00

## 2025-08-04 RX ADMIN — ENOXAPARIN SODIUM 40 MG: 100 INJECTION SUBCUTANEOUS at 15:17

## 2025-08-04 RX ADMIN — CEFEPIME 2000 MG: 2 INJECTION, POWDER, FOR SOLUTION INTRAVENOUS at 01:10

## 2025-08-04 RX ADMIN — DAPTOMYCIN 550 MG: 500 INJECTION, POWDER, LYOPHILIZED, FOR SOLUTION INTRAVENOUS at 18:02

## 2025-08-05 ENCOUNTER — READMISSION MANAGEMENT (OUTPATIENT)
Dept: CALL CENTER | Facility: HOSPITAL | Age: 70
End: 2025-08-05
Payer: MEDICARE

## 2025-08-05 VITALS
HEART RATE: 71 BPM | TEMPERATURE: 97.1 F | DIASTOLIC BLOOD PRESSURE: 88 MMHG | OXYGEN SATURATION: 97 % | BODY MASS INDEX: 31.54 KG/M2 | RESPIRATION RATE: 16 BRPM | WEIGHT: 184.75 LBS | SYSTOLIC BLOOD PRESSURE: 157 MMHG | HEIGHT: 64 IN

## 2025-08-05 DIAGNOSIS — M00.88: Primary | ICD-10-CM

## 2025-08-05 LAB
BACTERIA SPEC AEROBE CULT: NORMAL
BACTERIA SPEC AEROBE CULT: NORMAL

## 2025-08-05 PROCEDURE — 25010000002 CEFTRIAXONE PER 250 MG: Performed by: NURSE PRACTITIONER

## 2025-08-05 PROCEDURE — 25010000002 DAPTOMYCIN PER 1 MG: Performed by: NURSE PRACTITIONER

## 2025-08-05 RX ORDER — AMLODIPINE BESYLATE 10 MG/1
10 TABLET ORAL
Qty: 30 TABLET | Refills: 3 | Status: SHIPPED | OUTPATIENT
Start: 2025-08-05 | End: 2025-08-12

## 2025-08-05 RX ADMIN — Medication 10 ML: at 08:24

## 2025-08-05 RX ADMIN — CEFTRIAXONE 2000 MG: 2 INJECTION, POWDER, FOR SOLUTION INTRAMUSCULAR; INTRAVENOUS at 12:26

## 2025-08-05 RX ADMIN — DAPTOMYCIN 550 MG: 500 INJECTION, POWDER, LYOPHILIZED, FOR SOLUTION INTRAVENOUS at 12:26

## 2025-08-05 RX ADMIN — AMLODIPINE BESYLATE 10 MG: 5 TABLET ORAL at 08:24

## 2025-08-06 ENCOUNTER — HOSPITAL ENCOUNTER (OUTPATIENT)
Dept: INFUSION THERAPY | Facility: HOSPITAL | Age: 70
Discharge: HOME | End: 2025-08-06
Admitting: NURSE PRACTITIONER
Payer: MEDICARE

## 2025-08-06 ENCOUNTER — TRANSITIONAL CARE MANAGEMENT TELEPHONE ENCOUNTER (OUTPATIENT)
Dept: CALL CENTER | Facility: HOSPITAL | Age: 70
End: 2025-08-06
Payer: MEDICARE

## 2025-08-06 VITALS
RESPIRATION RATE: 16 BRPM | HEART RATE: 83 BPM | SYSTOLIC BLOOD PRESSURE: 130 MMHG | OXYGEN SATURATION: 98 % | TEMPERATURE: 97.9 F | DIASTOLIC BLOOD PRESSURE: 83 MMHG

## 2025-08-06 DIAGNOSIS — M00.88: Primary | ICD-10-CM

## 2025-08-06 PROCEDURE — 96368 THER/DIAG CONCURRENT INF: CPT

## 2025-08-06 PROCEDURE — 25010000002 CEFTRIAXONE PER 250 MG: Performed by: NURSE PRACTITIONER

## 2025-08-06 PROCEDURE — 25010000002 DAPTOMYCIN PER 1 MG: Performed by: NURSE PRACTITIONER

## 2025-08-06 PROCEDURE — 96365 THER/PROPH/DIAG IV INF INIT: CPT

## 2025-08-06 RX ADMIN — DAPTOMYCIN 650 MG: 500 INJECTION, POWDER, LYOPHILIZED, FOR SOLUTION INTRAVENOUS at 09:48

## 2025-08-06 RX ADMIN — SODIUM CHLORIDE 2000 MG: 900 INJECTION INTRAVENOUS at 09:47

## 2025-08-07 ENCOUNTER — HOSPITAL ENCOUNTER (OUTPATIENT)
Dept: INFUSION THERAPY | Facility: HOSPITAL | Age: 70
Discharge: HOME | End: 2025-08-07
Admitting: NURSE PRACTITIONER
Payer: MEDICARE

## 2025-08-07 VITALS
TEMPERATURE: 98.4 F | RESPIRATION RATE: 18 BRPM | SYSTOLIC BLOOD PRESSURE: 135 MMHG | DIASTOLIC BLOOD PRESSURE: 80 MMHG | HEART RATE: 95 BPM | OXYGEN SATURATION: 96 %

## 2025-08-07 DIAGNOSIS — M00.88: Primary | ICD-10-CM

## 2025-08-07 PROCEDURE — 96365 THER/PROPH/DIAG IV INF INIT: CPT

## 2025-08-07 PROCEDURE — 25010000002 DAPTOMYCIN PER 1 MG: Performed by: NURSE PRACTITIONER

## 2025-08-07 PROCEDURE — 25010000002 CEFTRIAXONE PER 250 MG: Performed by: NURSE PRACTITIONER

## 2025-08-07 PROCEDURE — 96368 THER/DIAG CONCURRENT INF: CPT

## 2025-08-07 RX ADMIN — DAPTOMYCIN 650 MG: 500 INJECTION, POWDER, LYOPHILIZED, FOR SOLUTION INTRAVENOUS at 14:03

## 2025-08-07 RX ADMIN — SODIUM CHLORIDE 2000 MG: 900 INJECTION INTRAVENOUS at 14:03

## 2025-08-08 ENCOUNTER — HOSPITAL ENCOUNTER (OUTPATIENT)
Dept: INFUSION THERAPY | Facility: HOSPITAL | Age: 70
Discharge: HOME | End: 2025-08-08
Payer: MEDICARE

## 2025-08-08 VITALS
HEART RATE: 84 BPM | OXYGEN SATURATION: 99 % | RESPIRATION RATE: 16 BRPM | SYSTOLIC BLOOD PRESSURE: 117 MMHG | DIASTOLIC BLOOD PRESSURE: 83 MMHG | TEMPERATURE: 97.9 F

## 2025-08-08 DIAGNOSIS — M00.88: Primary | ICD-10-CM

## 2025-08-08 PROCEDURE — 96368 THER/DIAG CONCURRENT INF: CPT

## 2025-08-08 PROCEDURE — 25010000002 CEFTRIAXONE PER 250 MG: Performed by: NURSE PRACTITIONER

## 2025-08-08 PROCEDURE — 96365 THER/PROPH/DIAG IV INF INIT: CPT

## 2025-08-08 PROCEDURE — 25010000002 DAPTOMYCIN PER 1 MG: Performed by: NURSE PRACTITIONER

## 2025-08-08 RX ADMIN — SODIUM CHLORIDE 2000 MG: 900 INJECTION INTRAVENOUS at 09:55

## 2025-08-08 RX ADMIN — DAPTOMYCIN 650 MG: 500 INJECTION, POWDER, LYOPHILIZED, FOR SOLUTION INTRAVENOUS at 09:50

## 2025-08-09 ENCOUNTER — HOSPITAL ENCOUNTER (OUTPATIENT)
Dept: INFUSION THERAPY | Facility: HOSPITAL | Age: 70
Discharge: HOME | End: 2025-08-09
Payer: MEDICARE

## 2025-08-09 VITALS
RESPIRATION RATE: 17 BRPM | HEART RATE: 77 BPM | SYSTOLIC BLOOD PRESSURE: 139 MMHG | DIASTOLIC BLOOD PRESSURE: 86 MMHG | OXYGEN SATURATION: 97 %

## 2025-08-09 DIAGNOSIS — M00.88: Primary | ICD-10-CM

## 2025-08-09 PROCEDURE — 96368 THER/DIAG CONCURRENT INF: CPT

## 2025-08-09 PROCEDURE — 25010000002 DAPTOMYCIN PER 1 MG: Performed by: NURSE PRACTITIONER

## 2025-08-09 PROCEDURE — 96365 THER/PROPH/DIAG IV INF INIT: CPT

## 2025-08-09 PROCEDURE — 25010000002 CEFTRIAXONE PER 250 MG: Performed by: NURSE PRACTITIONER

## 2025-08-09 RX ADMIN — SODIUM CHLORIDE 2000 MG: 900 INJECTION INTRAVENOUS at 08:30

## 2025-08-09 RX ADMIN — DAPTOMYCIN 650 MG: 500 INJECTION, POWDER, LYOPHILIZED, FOR SOLUTION INTRAVENOUS at 08:30

## 2025-08-10 ENCOUNTER — HOSPITAL ENCOUNTER (OUTPATIENT)
Dept: INFUSION THERAPY | Facility: HOSPITAL | Age: 70
Discharge: HOME | End: 2025-08-10
Admitting: NURSE PRACTITIONER
Payer: MEDICARE

## 2025-08-10 VITALS
OXYGEN SATURATION: 97 % | SYSTOLIC BLOOD PRESSURE: 138 MMHG | TEMPERATURE: 98.3 F | HEART RATE: 82 BPM | RESPIRATION RATE: 17 BRPM | DIASTOLIC BLOOD PRESSURE: 88 MMHG

## 2025-08-10 DIAGNOSIS — M00.88: Primary | ICD-10-CM

## 2025-08-10 DIAGNOSIS — D64.9 ANEMIA, UNSPECIFIED TYPE: ICD-10-CM

## 2025-08-10 LAB
ALBUMIN SERPL-MCNC: 4.2 G/DL (ref 3.5–5.2)
ALBUMIN/GLOB SERPL: 1.4 G/DL
ALP SERPL-CCNC: 75 U/L (ref 39–117)
ALT SERPL W P-5'-P-CCNC: 19 U/L (ref 1–33)
ANION GAP SERPL CALCULATED.3IONS-SCNC: 13.6 MMOL/L (ref 5–15)
AST SERPL-CCNC: 21 U/L (ref 1–32)
BASOPHILS # BLD AUTO: 0.08 10*3/MM3 (ref 0–0.2)
BASOPHILS NFR BLD AUTO: 1.1 % (ref 0–1.5)
BILIRUB SERPL-MCNC: 0.3 MG/DL (ref 0–1.2)
BUN SERPL-MCNC: 14.9 MG/DL (ref 8–23)
BUN/CREAT SERPL: 20.4 (ref 7–25)
CALCIUM SPEC-SCNC: 9.3 MG/DL (ref 8.6–10.5)
CHLORIDE SERPL-SCNC: 101 MMOL/L (ref 98–107)
CK SERPL-CCNC: 68 U/L (ref 20–180)
CO2 SERPL-SCNC: 24.4 MMOL/L (ref 22–29)
CREAT SERPL-MCNC: 0.72 MG/DL (ref 0.57–1)
CREAT SERPL-MCNC: 0.73 MG/DL (ref 0.57–1)
DEPRECATED RDW RBC AUTO: 48.6 FL (ref 37–54)
EGFRCR SERPLBLD CKD-EPI 2021: 89.7 ML/MIN/1.73
EGFRCR SERPLBLD CKD-EPI 2021: 91.2 ML/MIN/1.73
EOSINOPHIL # BLD AUTO: 0.37 10*3/MM3 (ref 0–0.4)
EOSINOPHIL NFR BLD AUTO: 5.2 % (ref 0.3–6.2)
ERYTHROCYTE [DISTWIDTH] IN BLOOD BY AUTOMATED COUNT: 15.2 % (ref 12.3–15.4)
ERYTHROCYTE [SEDIMENTATION RATE] IN BLOOD: 31 MM/HR (ref 0–30)
FERRITIN SERPL-MCNC: 273 NG/ML (ref 13–150)
GLOBULIN UR ELPH-MCNC: 3 GM/DL
GLUCOSE SERPL-MCNC: 126 MG/DL (ref 65–99)
HCT VFR BLD AUTO: 36 % (ref 34–46.6)
HGB BLD-MCNC: 10.8 G/DL (ref 12–15.9)
IMM GRANULOCYTES # BLD AUTO: 0.02 10*3/MM3 (ref 0–0.05)
IMM GRANULOCYTES NFR BLD AUTO: 0.3 % (ref 0–0.5)
IRON 24H UR-MRATE: 42 MCG/DL (ref 37–145)
IRON SATN MFR SERPL: 16 % (ref 20–50)
LDH SERPL-CCNC: 167 U/L (ref 135–214)
LYMPHOCYTES # BLD AUTO: 1.48 10*3/MM3 (ref 0.7–3.1)
LYMPHOCYTES NFR BLD AUTO: 20.9 % (ref 19.6–45.3)
MCH RBC QN AUTO: 26.1 PG (ref 26.6–33)
MCHC RBC AUTO-ENTMCNC: 30 G/DL (ref 31.5–35.7)
MCV RBC AUTO: 87 FL (ref 79–97)
MONOCYTES # BLD AUTO: 0.41 10*3/MM3 (ref 0.1–0.9)
MONOCYTES NFR BLD AUTO: 5.8 % (ref 5–12)
NEUTROPHILS NFR BLD AUTO: 4.72 10*3/MM3 (ref 1.7–7)
NEUTROPHILS NFR BLD AUTO: 66.7 % (ref 42.7–76)
NRBC BLD AUTO-RTO: 0 /100 WBC (ref 0–0.2)
PLATELET # BLD AUTO: 344 10*3/MM3 (ref 140–450)
PMV BLD AUTO: 8.8 FL (ref 6–12)
POTASSIUM SERPL-SCNC: 3.9 MMOL/L (ref 3.5–5.2)
PROT SERPL-MCNC: 7.2 G/DL (ref 6–8.5)
RBC # BLD AUTO: 4.14 10*6/MM3 (ref 3.77–5.28)
SODIUM SERPL-SCNC: 139 MMOL/L (ref 136–145)
TIBC SERPL-MCNC: 255 MCG/DL (ref 298–536)
TRANSFERRIN SERPL-MCNC: 171 MG/DL (ref 200–360)
WBC NRBC COR # BLD AUTO: 7.08 10*3/MM3 (ref 3.4–10.8)

## 2025-08-10 PROCEDURE — 82550 ASSAY OF CK (CPK): CPT | Performed by: NURSE PRACTITIONER

## 2025-08-10 PROCEDURE — 96368 THER/DIAG CONCURRENT INF: CPT

## 2025-08-10 PROCEDURE — 25010000002 DAPTOMYCIN PER 1 MG: Performed by: NURSE PRACTITIONER

## 2025-08-10 PROCEDURE — 83615 LACTATE (LD) (LDH) ENZYME: CPT | Performed by: STUDENT IN AN ORGANIZED HEALTH CARE EDUCATION/TRAINING PROGRAM

## 2025-08-10 PROCEDURE — 84466 ASSAY OF TRANSFERRIN: CPT | Performed by: STUDENT IN AN ORGANIZED HEALTH CARE EDUCATION/TRAINING PROGRAM

## 2025-08-10 PROCEDURE — 85652 RBC SED RATE AUTOMATED: CPT | Performed by: NURSE PRACTITIONER

## 2025-08-10 PROCEDURE — 83540 ASSAY OF IRON: CPT | Performed by: STUDENT IN AN ORGANIZED HEALTH CARE EDUCATION/TRAINING PROGRAM

## 2025-08-10 PROCEDURE — 82728 ASSAY OF FERRITIN: CPT | Performed by: STUDENT IN AN ORGANIZED HEALTH CARE EDUCATION/TRAINING PROGRAM

## 2025-08-10 PROCEDURE — 85025 COMPLETE CBC W/AUTO DIFF WBC: CPT | Performed by: NURSE PRACTITIONER

## 2025-08-10 PROCEDURE — 96365 THER/PROPH/DIAG IV INF INIT: CPT

## 2025-08-10 PROCEDURE — 80053 COMPREHEN METABOLIC PANEL: CPT | Performed by: STUDENT IN AN ORGANIZED HEALTH CARE EDUCATION/TRAINING PROGRAM

## 2025-08-10 PROCEDURE — 36592 COLLECT BLOOD FROM PICC: CPT

## 2025-08-10 PROCEDURE — 25010000002 CEFTRIAXONE PER 250 MG: Performed by: NURSE PRACTITIONER

## 2025-08-10 RX ADMIN — DAPTOMYCIN 650 MG: 500 INJECTION, POWDER, LYOPHILIZED, FOR SOLUTION INTRAVENOUS at 08:17

## 2025-08-10 RX ADMIN — SODIUM CHLORIDE 2000 MG: 900 INJECTION INTRAVENOUS at 08:20

## 2025-08-11 ENCOUNTER — HOSPITAL ENCOUNTER (OUTPATIENT)
Dept: INFUSION THERAPY | Facility: HOSPITAL | Age: 70
Discharge: HOME | End: 2025-08-11
Payer: MEDICARE

## 2025-08-11 ENCOUNTER — HOSPITAL ENCOUNTER (OUTPATIENT)
Dept: CARDIOLOGY | Facility: HOSPITAL | Age: 70
Discharge: HOME | End: 2025-08-11
Payer: MEDICARE

## 2025-08-11 ENCOUNTER — OFFICE VISIT (OUTPATIENT)
Dept: ONCOLOGY | Facility: CLINIC | Age: 70
End: 2025-08-11
Payer: MEDICARE

## 2025-08-11 ENCOUNTER — TELEPHONE (OUTPATIENT)
Dept: ONCOLOGY | Facility: CLINIC | Age: 70
End: 2025-08-11
Payer: MEDICARE

## 2025-08-11 VITALS
DIASTOLIC BLOOD PRESSURE: 90 MMHG | HEART RATE: 75 BPM | SYSTOLIC BLOOD PRESSURE: 150 MMHG | RESPIRATION RATE: 19 BRPM | TEMPERATURE: 98 F | OXYGEN SATURATION: 97 %

## 2025-08-11 VITALS
SYSTOLIC BLOOD PRESSURE: 146 MMHG | HEART RATE: 85 BPM | DIASTOLIC BLOOD PRESSURE: 93 MMHG | OXYGEN SATURATION: 97 % | HEIGHT: 64 IN | WEIGHT: 186.6 LBS | BODY MASS INDEX: 31.86 KG/M2

## 2025-08-11 DIAGNOSIS — M00.88: Primary | ICD-10-CM

## 2025-08-11 DIAGNOSIS — M79.89 LEG SWELLING: ICD-10-CM

## 2025-08-11 DIAGNOSIS — N73.9 PELVIC ABSCESS IN FEMALE: ICD-10-CM

## 2025-08-11 DIAGNOSIS — M00.88: ICD-10-CM

## 2025-08-11 DIAGNOSIS — D64.9 ANEMIA, UNSPECIFIED TYPE: Primary | ICD-10-CM

## 2025-08-11 DIAGNOSIS — R19.00 PELVIC MASS: ICD-10-CM

## 2025-08-11 LAB

## 2025-08-11 PROCEDURE — 93970 EXTREMITY STUDY: CPT | Performed by: SURGERY

## 2025-08-11 PROCEDURE — 96365 THER/PROPH/DIAG IV INF INIT: CPT

## 2025-08-11 PROCEDURE — 82746 ASSAY OF FOLIC ACID SERUM: CPT | Performed by: STUDENT IN AN ORGANIZED HEALTH CARE EDUCATION/TRAINING PROGRAM

## 2025-08-11 PROCEDURE — 93970 EXTREMITY STUDY: CPT

## 2025-08-11 PROCEDURE — 25010000002 DAPTOMYCIN PER 1 MG: Performed by: NURSE PRACTITIONER

## 2025-08-11 PROCEDURE — 1126F AMNT PAIN NOTED NONE PRSNT: CPT | Performed by: STUDENT IN AN ORGANIZED HEALTH CARE EDUCATION/TRAINING PROGRAM

## 2025-08-11 PROCEDURE — 96368 THER/DIAG CONCURRENT INF: CPT

## 2025-08-11 PROCEDURE — 25010000002 CEFTRIAXONE PER 250 MG: Performed by: NURSE PRACTITIONER

## 2025-08-11 PROCEDURE — 99214 OFFICE O/P EST MOD 30 MIN: CPT | Performed by: STUDENT IN AN ORGANIZED HEALTH CARE EDUCATION/TRAINING PROGRAM

## 2025-08-11 PROCEDURE — 82607 VITAMIN B-12: CPT | Performed by: STUDENT IN AN ORGANIZED HEALTH CARE EDUCATION/TRAINING PROGRAM

## 2025-08-11 RX ADMIN — SODIUM CHLORIDE 2000 MG: 900 INJECTION INTRAVENOUS at 09:01

## 2025-08-11 RX ADMIN — DAPTOMYCIN 650 MG: 500 INJECTION, POWDER, LYOPHILIZED, FOR SOLUTION INTRAVENOUS at 09:00

## 2025-08-12 ENCOUNTER — HOSPITAL ENCOUNTER (OUTPATIENT)
Dept: INFUSION THERAPY | Facility: HOSPITAL | Age: 70
Discharge: HOME | End: 2025-08-12
Admitting: NURSE PRACTITIONER
Payer: MEDICARE

## 2025-08-12 ENCOUNTER — OFFICE VISIT (OUTPATIENT)
Dept: FAMILY MEDICINE CLINIC | Facility: CLINIC | Age: 70
End: 2025-08-12
Payer: MEDICARE

## 2025-08-12 VITALS
HEART RATE: 83 BPM | OXYGEN SATURATION: 99 % | HEIGHT: 64 IN | SYSTOLIC BLOOD PRESSURE: 140 MMHG | BODY MASS INDEX: 31.76 KG/M2 | DIASTOLIC BLOOD PRESSURE: 88 MMHG | RESPIRATION RATE: 20 BRPM | WEIGHT: 186 LBS

## 2025-08-12 VITALS
SYSTOLIC BLOOD PRESSURE: 127 MMHG | HEART RATE: 84 BPM | OXYGEN SATURATION: 98 % | TEMPERATURE: 97.8 F | RESPIRATION RATE: 16 BRPM | DIASTOLIC BLOOD PRESSURE: 82 MMHG

## 2025-08-12 DIAGNOSIS — M00.9 PYOGENIC ARTHRITIS OF RIGHT HIP, DUE TO UNSPECIFIED ORGANISM: ICD-10-CM

## 2025-08-12 DIAGNOSIS — M00.88: Primary | ICD-10-CM

## 2025-08-12 DIAGNOSIS — I10 PRIMARY HYPERTENSION: ICD-10-CM

## 2025-08-12 DIAGNOSIS — Z09 HOSPITAL DISCHARGE FOLLOW-UP: Primary | ICD-10-CM

## 2025-08-12 PROCEDURE — 96368 THER/DIAG CONCURRENT INF: CPT

## 2025-08-12 PROCEDURE — 96365 THER/PROPH/DIAG IV INF INIT: CPT

## 2025-08-12 PROCEDURE — 25010000002 DAPTOMYCIN PER 1 MG: Performed by: NURSE PRACTITIONER

## 2025-08-12 PROCEDURE — 25010000002 CEFTRIAXONE PER 250 MG: Performed by: NURSE PRACTITIONER

## 2025-08-12 RX ORDER — LOSARTAN POTASSIUM 50 MG/1
50 TABLET ORAL DAILY
Qty: 30 TABLET | Refills: 1 | Status: SHIPPED | OUTPATIENT
Start: 2025-08-12

## 2025-08-12 RX ADMIN — DAPTOMYCIN 700 MG: 500 INJECTION, POWDER, LYOPHILIZED, FOR SOLUTION INTRAVENOUS at 13:29

## 2025-08-12 RX ADMIN — SODIUM CHLORIDE 2000 MG: 900 INJECTION INTRAVENOUS at 13:22

## 2025-08-13 ENCOUNTER — HOSPITAL ENCOUNTER (OUTPATIENT)
Dept: INFUSION THERAPY | Facility: HOSPITAL | Age: 70
Discharge: HOME | End: 2025-08-13
Admitting: NURSE PRACTITIONER
Payer: MEDICARE

## 2025-08-13 VITALS
TEMPERATURE: 97.9 F | RESPIRATION RATE: 16 BRPM | OXYGEN SATURATION: 98 % | SYSTOLIC BLOOD PRESSURE: 126 MMHG | HEART RATE: 69 BPM | DIASTOLIC BLOOD PRESSURE: 83 MMHG

## 2025-08-13 DIAGNOSIS — M00.88: Primary | ICD-10-CM

## 2025-08-13 PROCEDURE — 96365 THER/PROPH/DIAG IV INF INIT: CPT

## 2025-08-13 PROCEDURE — 25010000002 DAPTOMYCIN PER 1 MG: Performed by: NURSE PRACTITIONER

## 2025-08-13 PROCEDURE — 25010000002 CEFTRIAXONE PER 250 MG: Performed by: NURSE PRACTITIONER

## 2025-08-13 PROCEDURE — 96368 THER/DIAG CONCURRENT INF: CPT

## 2025-08-13 RX ADMIN — SODIUM CHLORIDE 2000 MG: 900 INJECTION INTRAVENOUS at 14:21

## 2025-08-13 RX ADMIN — DAPTOMYCIN 700 MG: 500 INJECTION, POWDER, LYOPHILIZED, FOR SOLUTION INTRAVENOUS at 14:21

## 2025-08-14 ENCOUNTER — HOSPITAL ENCOUNTER (OUTPATIENT)
Dept: INFUSION THERAPY | Facility: HOSPITAL | Age: 70
Discharge: HOME | End: 2025-08-14
Admitting: NURSE PRACTITIONER
Payer: MEDICARE

## 2025-08-14 VITALS
RESPIRATION RATE: 18 BRPM | SYSTOLIC BLOOD PRESSURE: 127 MMHG | TEMPERATURE: 97.7 F | DIASTOLIC BLOOD PRESSURE: 87 MMHG | OXYGEN SATURATION: 97 % | HEART RATE: 73 BPM

## 2025-08-14 DIAGNOSIS — M00.88: Primary | ICD-10-CM

## 2025-08-14 PROCEDURE — 25010000002 CEFTRIAXONE PER 250 MG: Performed by: NURSE PRACTITIONER

## 2025-08-14 PROCEDURE — 25010000002 DAPTOMYCIN PER 1 MG: Performed by: NURSE PRACTITIONER

## 2025-08-14 PROCEDURE — 96365 THER/PROPH/DIAG IV INF INIT: CPT

## 2025-08-14 PROCEDURE — 96368 THER/DIAG CONCURRENT INF: CPT

## 2025-08-14 RX ADMIN — SODIUM CHLORIDE 2000 MG: 900 INJECTION INTRAVENOUS at 09:52

## 2025-08-14 RX ADMIN — DAPTOMYCIN 700 MG: 500 INJECTION, POWDER, LYOPHILIZED, FOR SOLUTION INTRAVENOUS at 09:52

## 2025-08-15 ENCOUNTER — HOSPITAL ENCOUNTER (OUTPATIENT)
Dept: INFUSION THERAPY | Facility: HOSPITAL | Age: 70
Discharge: HOME | End: 2025-08-15
Payer: MEDICARE

## 2025-08-15 VITALS
HEART RATE: 71 BPM | OXYGEN SATURATION: 99 % | SYSTOLIC BLOOD PRESSURE: 139 MMHG | RESPIRATION RATE: 16 BRPM | DIASTOLIC BLOOD PRESSURE: 88 MMHG | TEMPERATURE: 98.8 F

## 2025-08-15 DIAGNOSIS — M00.88: Primary | ICD-10-CM

## 2025-08-15 PROCEDURE — 96365 THER/PROPH/DIAG IV INF INIT: CPT

## 2025-08-15 PROCEDURE — 25010000002 CEFTRIAXONE PER 250 MG: Performed by: NURSE PRACTITIONER

## 2025-08-15 PROCEDURE — 25010000002 DAPTOMYCIN PER 1 MG: Performed by: NURSE PRACTITIONER

## 2025-08-15 PROCEDURE — 96368 THER/DIAG CONCURRENT INF: CPT

## 2025-08-15 RX ADMIN — DAPTOMYCIN 700 MG: 500 INJECTION, POWDER, LYOPHILIZED, FOR SOLUTION INTRAVENOUS at 10:03

## 2025-08-15 RX ADMIN — SODIUM CHLORIDE 2000 MG: 900 INJECTION INTRAVENOUS at 10:03

## 2025-08-16 ENCOUNTER — HOSPITAL ENCOUNTER (OUTPATIENT)
Dept: INFUSION THERAPY | Facility: HOSPITAL | Age: 70
Discharge: HOME | End: 2025-08-16
Payer: MEDICARE

## 2025-08-16 VITALS
DIASTOLIC BLOOD PRESSURE: 87 MMHG | TEMPERATURE: 97.7 F | OXYGEN SATURATION: 98 % | RESPIRATION RATE: 16 BRPM | HEART RATE: 70 BPM | SYSTOLIC BLOOD PRESSURE: 129 MMHG

## 2025-08-16 DIAGNOSIS — M00.88: Primary | ICD-10-CM

## 2025-08-16 PROCEDURE — 96368 THER/DIAG CONCURRENT INF: CPT

## 2025-08-16 PROCEDURE — 96365 THER/PROPH/DIAG IV INF INIT: CPT

## 2025-08-16 PROCEDURE — 25010000002 DAPTOMYCIN PER 1 MG: Performed by: NURSE PRACTITIONER

## 2025-08-16 PROCEDURE — 25010000002 CEFTRIAXONE PER 250 MG: Performed by: NURSE PRACTITIONER

## 2025-08-16 RX ADMIN — DAPTOMYCIN 700 MG: 500 INJECTION, POWDER, LYOPHILIZED, FOR SOLUTION INTRAVENOUS at 10:37

## 2025-08-16 RX ADMIN — SODIUM CHLORIDE 2000 MG: 900 INJECTION INTRAVENOUS at 10:36

## 2025-08-17 ENCOUNTER — HOSPITAL ENCOUNTER (OUTPATIENT)
Dept: INFUSION THERAPY | Facility: HOSPITAL | Age: 70
Discharge: HOME | End: 2025-08-17
Admitting: NURSE PRACTITIONER
Payer: MEDICARE

## 2025-08-17 VITALS
RESPIRATION RATE: 14 BRPM | TEMPERATURE: 97.2 F | DIASTOLIC BLOOD PRESSURE: 85 MMHG | HEART RATE: 73 BPM | OXYGEN SATURATION: 97 % | SYSTOLIC BLOOD PRESSURE: 128 MMHG

## 2025-08-17 DIAGNOSIS — M00.88: Primary | ICD-10-CM

## 2025-08-17 PROCEDURE — 25010000002 CEFTRIAXONE PER 250 MG: Performed by: NURSE PRACTITIONER

## 2025-08-17 PROCEDURE — 96365 THER/PROPH/DIAG IV INF INIT: CPT

## 2025-08-17 PROCEDURE — 96368 THER/DIAG CONCURRENT INF: CPT

## 2025-08-17 PROCEDURE — 25010000002 DAPTOMYCIN PER 1 MG: Performed by: NURSE PRACTITIONER

## 2025-08-17 RX ADMIN — DAPTOMYCIN 700 MG: 500 INJECTION, POWDER, LYOPHILIZED, FOR SOLUTION INTRAVENOUS at 08:24

## 2025-08-17 RX ADMIN — SODIUM CHLORIDE 2000 MG: 900 INJECTION INTRAVENOUS at 08:23

## 2025-08-18 ENCOUNTER — HOSPITAL ENCOUNTER (OUTPATIENT)
Dept: INFUSION THERAPY | Facility: HOSPITAL | Age: 70
Discharge: HOME | End: 2025-08-18
Admitting: NURSE PRACTITIONER
Payer: MEDICARE

## 2025-08-18 VITALS
HEART RATE: 66 BPM | OXYGEN SATURATION: 98 % | RESPIRATION RATE: 18 BRPM | SYSTOLIC BLOOD PRESSURE: 157 MMHG | DIASTOLIC BLOOD PRESSURE: 92 MMHG

## 2025-08-18 DIAGNOSIS — M00.88: Primary | ICD-10-CM

## 2025-08-18 LAB
BASOPHILS # BLD AUTO: 0.06 10*3/MM3 (ref 0–0.2)
BASOPHILS NFR BLD AUTO: 0.9 % (ref 0–1.5)
CK SERPL-CCNC: 289 U/L (ref 20–180)
CREAT SERPL-MCNC: 0.79 MG/DL (ref 0.57–1)
DEPRECATED RDW RBC AUTO: 48.5 FL (ref 37–54)
EGFRCR SERPLBLD CKD-EPI 2021: 81.6 ML/MIN/1.73
EOSINOPHIL # BLD AUTO: 0.36 10*3/MM3 (ref 0–0.4)
EOSINOPHIL NFR BLD AUTO: 5.1 % (ref 0.3–6.2)
ERYTHROCYTE [DISTWIDTH] IN BLOOD BY AUTOMATED COUNT: 15.1 % (ref 12.3–15.4)
ERYTHROCYTE [SEDIMENTATION RATE] IN BLOOD: 28 MM/HR (ref 0–30)
HCT VFR BLD AUTO: 36.6 % (ref 34–46.6)
HGB BLD-MCNC: 11.1 G/DL (ref 12–15.9)
IMM GRANULOCYTES # BLD AUTO: 0.03 10*3/MM3 (ref 0–0.05)
IMM GRANULOCYTES NFR BLD AUTO: 0.4 % (ref 0–0.5)
LYMPHOCYTES # BLD AUTO: 1.3 10*3/MM3 (ref 0.7–3.1)
LYMPHOCYTES NFR BLD AUTO: 18.6 % (ref 19.6–45.3)
MCH RBC QN AUTO: 26.6 PG (ref 26.6–33)
MCHC RBC AUTO-ENTMCNC: 30.3 G/DL (ref 31.5–35.7)
MCV RBC AUTO: 87.6 FL (ref 79–97)
MONOCYTES # BLD AUTO: 0.42 10*3/MM3 (ref 0.1–0.9)
MONOCYTES NFR BLD AUTO: 6 % (ref 5–12)
NEUTROPHILS NFR BLD AUTO: 4.83 10*3/MM3 (ref 1.7–7)
NEUTROPHILS NFR BLD AUTO: 69 % (ref 42.7–76)
NRBC BLD AUTO-RTO: 0 /100 WBC (ref 0–0.2)
PLATELET # BLD AUTO: 319 10*3/MM3 (ref 140–450)
PMV BLD AUTO: 9.1 FL (ref 6–12)
RBC # BLD AUTO: 4.18 10*6/MM3 (ref 3.77–5.28)
WBC NRBC COR # BLD AUTO: 7 10*3/MM3 (ref 3.4–10.8)

## 2025-08-18 PROCEDURE — 25010000002 CEFTRIAXONE PER 250 MG: Performed by: NURSE PRACTITIONER

## 2025-08-18 PROCEDURE — 85652 RBC SED RATE AUTOMATED: CPT | Performed by: NURSE PRACTITIONER

## 2025-08-18 PROCEDURE — 96365 THER/PROPH/DIAG IV INF INIT: CPT

## 2025-08-18 PROCEDURE — 85025 COMPLETE CBC W/AUTO DIFF WBC: CPT | Performed by: NURSE PRACTITIONER

## 2025-08-18 PROCEDURE — 25010000002 DAPTOMYCIN PER 1 MG: Performed by: NURSE PRACTITIONER

## 2025-08-18 PROCEDURE — 82565 ASSAY OF CREATININE: CPT | Performed by: NURSE PRACTITIONER

## 2025-08-18 PROCEDURE — 36592 COLLECT BLOOD FROM PICC: CPT

## 2025-08-18 PROCEDURE — 96368 THER/DIAG CONCURRENT INF: CPT

## 2025-08-18 PROCEDURE — 82550 ASSAY OF CK (CPK): CPT | Performed by: NURSE PRACTITIONER

## 2025-08-18 PROCEDURE — G0463 HOSPITAL OUTPT CLINIC VISIT: HCPCS

## 2025-08-18 RX ADMIN — SODIUM CHLORIDE 2000 MG: 900 INJECTION INTRAVENOUS at 09:40

## 2025-08-18 RX ADMIN — DAPTOMYCIN 700 MG: 500 INJECTION, POWDER, LYOPHILIZED, FOR SOLUTION INTRAVENOUS at 09:40

## 2025-08-19 ENCOUNTER — HOSPITAL ENCOUNTER (OUTPATIENT)
Dept: INFUSION THERAPY | Facility: HOSPITAL | Age: 70
Discharge: HOME | End: 2025-08-19
Admitting: NURSE PRACTITIONER
Payer: MEDICARE

## 2025-08-19 VITALS
OXYGEN SATURATION: 97 % | RESPIRATION RATE: 16 BRPM | SYSTOLIC BLOOD PRESSURE: 143 MMHG | DIASTOLIC BLOOD PRESSURE: 92 MMHG | HEART RATE: 65 BPM

## 2025-08-19 DIAGNOSIS — M00.88: Primary | ICD-10-CM

## 2025-08-19 PROCEDURE — 25010000002 CEFTRIAXONE PER 250 MG: Performed by: NURSE PRACTITIONER

## 2025-08-19 PROCEDURE — 96368 THER/DIAG CONCURRENT INF: CPT

## 2025-08-19 PROCEDURE — 25010000002 DAPTOMYCIN PER 1 MG: Performed by: NURSE PRACTITIONER

## 2025-08-19 PROCEDURE — 96365 THER/PROPH/DIAG IV INF INIT: CPT

## 2025-08-19 RX ADMIN — DAPTOMYCIN 700 MG: 500 INJECTION, POWDER, LYOPHILIZED, FOR SOLUTION INTRAVENOUS at 09:12

## 2025-08-19 RX ADMIN — SODIUM CHLORIDE 2000 MG: 900 INJECTION INTRAVENOUS at 09:14

## 2025-08-20 ENCOUNTER — HOSPITAL ENCOUNTER (OUTPATIENT)
Dept: INFUSION THERAPY | Facility: HOSPITAL | Age: 70
Discharge: HOME | End: 2025-08-20
Admitting: NURSE PRACTITIONER
Payer: MEDICARE

## 2025-08-20 ENCOUNTER — APPOINTMENT (OUTPATIENT)
Dept: INFUSION THERAPY | Facility: HOSPITAL | Age: 70
End: 2025-08-20
Payer: MEDICARE

## 2025-08-20 VITALS
HEART RATE: 70 BPM | SYSTOLIC BLOOD PRESSURE: 150 MMHG | DIASTOLIC BLOOD PRESSURE: 90 MMHG | TEMPERATURE: 98.1 F | OXYGEN SATURATION: 98 %

## 2025-08-20 DIAGNOSIS — M00.88: Primary | ICD-10-CM

## 2025-08-20 PROCEDURE — 96368 THER/DIAG CONCURRENT INF: CPT

## 2025-08-20 PROCEDURE — 25010000002 DAPTOMYCIN PER 1 MG: Performed by: NURSE PRACTITIONER

## 2025-08-20 PROCEDURE — 25010000002 CEFTRIAXONE PER 250 MG: Performed by: NURSE PRACTITIONER

## 2025-08-20 PROCEDURE — 96365 THER/PROPH/DIAG IV INF INIT: CPT

## 2025-08-20 RX ADMIN — SODIUM CHLORIDE 2000 MG: 900 INJECTION INTRAVENOUS at 09:02

## 2025-08-20 RX ADMIN — DAPTOMYCIN 700 MG: 500 INJECTION, POWDER, LYOPHILIZED, FOR SOLUTION INTRAVENOUS at 09:01

## 2025-08-21 ENCOUNTER — HOSPITAL ENCOUNTER (OUTPATIENT)
Dept: INFUSION THERAPY | Facility: HOSPITAL | Age: 70
Discharge: HOME | End: 2025-08-21
Admitting: NURSE PRACTITIONER
Payer: MEDICARE

## 2025-08-22 ENCOUNTER — HOSPITAL ENCOUNTER (OUTPATIENT)
Dept: INFUSION THERAPY | Facility: HOSPITAL | Age: 70
Discharge: HOME | End: 2025-08-22
Payer: MEDICARE

## 2025-08-22 ENCOUNTER — OFFICE VISIT (OUTPATIENT)
Dept: FAMILY MEDICINE CLINIC | Facility: CLINIC | Age: 70
End: 2025-08-22
Payer: MEDICARE

## 2025-08-22 VITALS
HEART RATE: 60 BPM | OXYGEN SATURATION: 98 % | DIASTOLIC BLOOD PRESSURE: 90 MMHG | HEIGHT: 64 IN | BODY MASS INDEX: 32.1 KG/M2 | SYSTOLIC BLOOD PRESSURE: 164 MMHG | RESPIRATION RATE: 20 BRPM | WEIGHT: 188 LBS

## 2025-08-22 DIAGNOSIS — I10 PRIMARY HYPERTENSION: Primary | ICD-10-CM

## 2025-08-22 DIAGNOSIS — R60.9 EDEMA, UNSPECIFIED TYPE: ICD-10-CM

## 2025-08-22 DIAGNOSIS — M00.9 PYOGENIC ARTHRITIS OF RIGHT HIP, DUE TO UNSPECIFIED ORGANISM: ICD-10-CM

## 2025-08-22 RX ORDER — LOSARTAN POTASSIUM 100 MG/1
100 TABLET ORAL DAILY
Qty: 30 TABLET | Refills: 1 | Status: SHIPPED | OUTPATIENT
Start: 2025-08-22

## 2025-08-23 ENCOUNTER — HOSPITAL ENCOUNTER (OUTPATIENT)
Dept: INFUSION THERAPY | Facility: HOSPITAL | Age: 70
Discharge: HOME | End: 2025-08-23
Payer: MEDICARE

## 2025-08-24 ENCOUNTER — HOSPITAL ENCOUNTER (OUTPATIENT)
Dept: INFUSION THERAPY | Facility: HOSPITAL | Age: 70
Discharge: HOME | End: 2025-08-24
Admitting: NURSE PRACTITIONER
Payer: MEDICARE

## 2025-08-24 VITALS
SYSTOLIC BLOOD PRESSURE: 151 MMHG | TEMPERATURE: 97.4 F | DIASTOLIC BLOOD PRESSURE: 97 MMHG | HEART RATE: 60 BPM | RESPIRATION RATE: 16 BRPM | OXYGEN SATURATION: 93 %

## 2025-08-24 DIAGNOSIS — M00.88: Primary | ICD-10-CM

## 2025-08-24 PROCEDURE — 96365 THER/PROPH/DIAG IV INF INIT: CPT

## 2025-08-24 PROCEDURE — 25010000002 DAPTOMYCIN PER 1 MG: Performed by: NURSE PRACTITIONER

## 2025-08-24 PROCEDURE — 96368 THER/DIAG CONCURRENT INF: CPT

## 2025-08-24 PROCEDURE — 25010000002 CEFTRIAXONE PER 250 MG: Performed by: NURSE PRACTITIONER

## 2025-08-24 RX ADMIN — DAPTOMYCIN 700 MG: 500 INJECTION, POWDER, LYOPHILIZED, FOR SOLUTION INTRAVENOUS at 08:55

## 2025-08-24 RX ADMIN — SODIUM CHLORIDE 2000 MG: 900 INJECTION INTRAVENOUS at 08:49

## 2025-08-25 ENCOUNTER — HOSPITAL ENCOUNTER (OUTPATIENT)
Dept: INFUSION THERAPY | Facility: HOSPITAL | Age: 70
Discharge: HOME | End: 2025-08-25
Admitting: NURSE PRACTITIONER
Payer: MEDICARE

## 2025-08-25 VITALS
OXYGEN SATURATION: 98 % | HEART RATE: 66 BPM | DIASTOLIC BLOOD PRESSURE: 93 MMHG | RESPIRATION RATE: 20 BRPM | SYSTOLIC BLOOD PRESSURE: 171 MMHG

## 2025-08-25 DIAGNOSIS — M00.88: Primary | ICD-10-CM

## 2025-08-25 LAB
BASOPHILS # BLD AUTO: 0.04 10*3/MM3 (ref 0–0.2)
BASOPHILS NFR BLD AUTO: 0.7 % (ref 0–1.5)
CK SERPL-CCNC: 166 U/L (ref 20–180)
CREAT SERPL-MCNC: 0.8 MG/DL (ref 0.57–1)
DEPRECATED RDW RBC AUTO: 48.9 FL (ref 37–54)
EGFRCR SERPLBLD CKD-EPI 2021: 80.4 ML/MIN/1.73
EOSINOPHIL # BLD AUTO: 0.35 10*3/MM3 (ref 0–0.4)
EOSINOPHIL NFR BLD AUTO: 6 % (ref 0.3–6.2)
ERYTHROCYTE [DISTWIDTH] IN BLOOD BY AUTOMATED COUNT: 15.1 % (ref 12.3–15.4)
ERYTHROCYTE [SEDIMENTATION RATE] IN BLOOD: 17 MM/HR (ref 0–30)
HCT VFR BLD AUTO: 34.4 % (ref 34–46.6)
HGB BLD-MCNC: 10.5 G/DL (ref 12–15.9)
IMM GRANULOCYTES # BLD AUTO: 0.01 10*3/MM3 (ref 0–0.05)
IMM GRANULOCYTES NFR BLD AUTO: 0.2 % (ref 0–0.5)
LYMPHOCYTES # BLD AUTO: 0.97 10*3/MM3 (ref 0.7–3.1)
LYMPHOCYTES NFR BLD AUTO: 16.6 % (ref 19.6–45.3)
MCH RBC QN AUTO: 26.7 PG (ref 26.6–33)
MCHC RBC AUTO-ENTMCNC: 30.5 G/DL (ref 31.5–35.7)
MCV RBC AUTO: 87.5 FL (ref 79–97)
MONOCYTES # BLD AUTO: 0.38 10*3/MM3 (ref 0.1–0.9)
MONOCYTES NFR BLD AUTO: 6.5 % (ref 5–12)
NEUTROPHILS NFR BLD AUTO: 4.08 10*3/MM3 (ref 1.7–7)
NEUTROPHILS NFR BLD AUTO: 70 % (ref 42.7–76)
NRBC BLD AUTO-RTO: 0 /100 WBC (ref 0–0.2)
PLATELET # BLD AUTO: 255 10*3/MM3 (ref 140–450)
PMV BLD AUTO: 9.3 FL (ref 6–12)
RBC # BLD AUTO: 3.93 10*6/MM3 (ref 3.77–5.28)
WBC NRBC COR # BLD AUTO: 5.83 10*3/MM3 (ref 3.4–10.8)

## 2025-08-25 PROCEDURE — G0463 HOSPITAL OUTPT CLINIC VISIT: HCPCS

## 2025-08-25 PROCEDURE — 96368 THER/DIAG CONCURRENT INF: CPT

## 2025-08-25 PROCEDURE — 85025 COMPLETE CBC W/AUTO DIFF WBC: CPT | Performed by: NURSE PRACTITIONER

## 2025-08-25 PROCEDURE — 25010000002 DAPTOMYCIN PER 1 MG: Performed by: NURSE PRACTITIONER

## 2025-08-25 PROCEDURE — 82565 ASSAY OF CREATININE: CPT | Performed by: NURSE PRACTITIONER

## 2025-08-25 PROCEDURE — 96365 THER/PROPH/DIAG IV INF INIT: CPT

## 2025-08-25 PROCEDURE — 36592 COLLECT BLOOD FROM PICC: CPT

## 2025-08-25 PROCEDURE — 85652 RBC SED RATE AUTOMATED: CPT | Performed by: NURSE PRACTITIONER

## 2025-08-25 PROCEDURE — 25010000002 CEFTRIAXONE PER 250 MG: Performed by: NURSE PRACTITIONER

## 2025-08-25 PROCEDURE — 82550 ASSAY OF CK (CPK): CPT | Performed by: NURSE PRACTITIONER

## 2025-08-25 RX ADMIN — SODIUM CHLORIDE 2000 MG: 900 INJECTION INTRAVENOUS at 08:47

## 2025-08-25 RX ADMIN — DAPTOMYCIN 700 MG: 500 INJECTION, POWDER, LYOPHILIZED, FOR SOLUTION INTRAVENOUS at 08:41

## 2025-08-26 ENCOUNTER — HOSPITAL ENCOUNTER (OUTPATIENT)
Dept: INFUSION THERAPY | Facility: HOSPITAL | Age: 70
Discharge: HOME | End: 2025-08-26
Admitting: NURSE PRACTITIONER
Payer: MEDICARE

## 2025-08-26 VITALS
HEART RATE: 70 BPM | OXYGEN SATURATION: 100 % | TEMPERATURE: 96 F | DIASTOLIC BLOOD PRESSURE: 82 MMHG | SYSTOLIC BLOOD PRESSURE: 158 MMHG | RESPIRATION RATE: 18 BRPM

## 2025-08-26 DIAGNOSIS — M00.88: Primary | ICD-10-CM

## 2025-08-26 PROCEDURE — 25010000002 DAPTOMYCIN PER 1 MG: Performed by: NURSE PRACTITIONER

## 2025-08-26 PROCEDURE — 96368 THER/DIAG CONCURRENT INF: CPT

## 2025-08-26 PROCEDURE — 96365 THER/PROPH/DIAG IV INF INIT: CPT

## 2025-08-26 PROCEDURE — 25010000002 CEFTRIAXONE PER 250 MG: Performed by: NURSE PRACTITIONER

## 2025-08-26 RX ADMIN — DAPTOMYCIN 700 MG: 500 INJECTION, POWDER, LYOPHILIZED, FOR SOLUTION INTRAVENOUS at 09:02

## 2025-08-26 RX ADMIN — SODIUM CHLORIDE 2000 MG: 900 INJECTION INTRAVENOUS at 09:02

## 2025-08-27 ENCOUNTER — HOSPITAL ENCOUNTER (OUTPATIENT)
Dept: INFUSION THERAPY | Facility: HOSPITAL | Age: 70
Discharge: HOME | End: 2025-08-27
Admitting: NURSE PRACTITIONER
Payer: MEDICARE

## 2025-08-27 VITALS
DIASTOLIC BLOOD PRESSURE: 90 MMHG | SYSTOLIC BLOOD PRESSURE: 168 MMHG | HEART RATE: 61 BPM | OXYGEN SATURATION: 100 % | TEMPERATURE: 97.4 F | RESPIRATION RATE: 16 BRPM

## 2025-08-27 DIAGNOSIS — M00.88: Primary | ICD-10-CM

## 2025-08-27 PROCEDURE — 25010000002 DAPTOMYCIN PER 1 MG: Performed by: NURSE PRACTITIONER

## 2025-08-27 PROCEDURE — 96365 THER/PROPH/DIAG IV INF INIT: CPT

## 2025-08-27 PROCEDURE — 25010000002 CEFTRIAXONE PER 250 MG: Performed by: NURSE PRACTITIONER

## 2025-08-27 PROCEDURE — 96368 THER/DIAG CONCURRENT INF: CPT

## 2025-08-27 RX ADMIN — DAPTOMYCIN 700 MG: 500 INJECTION, POWDER, LYOPHILIZED, FOR SOLUTION INTRAVENOUS at 08:39

## 2025-08-27 RX ADMIN — SODIUM CHLORIDE 2000 MG: 900 INJECTION INTRAVENOUS at 08:39

## 2025-08-28 ENCOUNTER — HOSPITAL ENCOUNTER (OUTPATIENT)
Dept: INFUSION THERAPY | Facility: HOSPITAL | Age: 70
Discharge: HOME | End: 2025-08-28
Admitting: NURSE PRACTITIONER
Payer: MEDICARE

## 2025-08-28 VITALS
TEMPERATURE: 97.1 F | SYSTOLIC BLOOD PRESSURE: 162 MMHG | OXYGEN SATURATION: 100 % | DIASTOLIC BLOOD PRESSURE: 86 MMHG | HEART RATE: 64 BPM | RESPIRATION RATE: 18 BRPM

## 2025-08-28 DIAGNOSIS — M00.88: Primary | ICD-10-CM

## 2025-08-28 PROCEDURE — 25010000002 DAPTOMYCIN PER 1 MG: Performed by: NURSE PRACTITIONER

## 2025-08-28 PROCEDURE — 96365 THER/PROPH/DIAG IV INF INIT: CPT

## 2025-08-28 PROCEDURE — 25010000002 CEFTRIAXONE PER 250 MG: Performed by: NURSE PRACTITIONER

## 2025-08-28 RX ADMIN — DAPTOMYCIN 700 MG: 500 INJECTION, POWDER, LYOPHILIZED, FOR SOLUTION INTRAVENOUS at 08:31

## 2025-08-28 RX ADMIN — SODIUM CHLORIDE 2000 MG: 900 INJECTION INTRAVENOUS at 08:31

## 2025-08-29 ENCOUNTER — HOSPITAL ENCOUNTER (OUTPATIENT)
Dept: INFUSION THERAPY | Facility: HOSPITAL | Age: 70
Discharge: HOME | End: 2025-08-29
Payer: MEDICARE

## 2025-08-29 VITALS
TEMPERATURE: 97.9 F | HEART RATE: 62 BPM | DIASTOLIC BLOOD PRESSURE: 98 MMHG | RESPIRATION RATE: 18 BRPM | SYSTOLIC BLOOD PRESSURE: 171 MMHG | OXYGEN SATURATION: 100 %

## 2025-08-29 DIAGNOSIS — M00.88: Primary | ICD-10-CM

## 2025-08-29 PROCEDURE — 25010000002 CEFTRIAXONE PER 250 MG: Performed by: NURSE PRACTITIONER

## 2025-08-29 PROCEDURE — 96368 THER/DIAG CONCURRENT INF: CPT

## 2025-08-29 PROCEDURE — 25010000002 DAPTOMYCIN PER 1 MG: Performed by: NURSE PRACTITIONER

## 2025-08-29 PROCEDURE — 96365 THER/PROPH/DIAG IV INF INIT: CPT

## 2025-08-29 RX ADMIN — DAPTOMYCIN 550 MG: 500 INJECTION, POWDER, LYOPHILIZED, FOR SOLUTION INTRAVENOUS at 08:47

## 2025-08-29 RX ADMIN — SODIUM CHLORIDE 2000 MG: 900 INJECTION INTRAVENOUS at 08:46

## 2025-08-30 ENCOUNTER — HOSPITAL ENCOUNTER (OUTPATIENT)
Dept: INFUSION THERAPY | Facility: HOSPITAL | Age: 70
Discharge: HOME | End: 2025-08-30
Payer: MEDICARE

## 2025-08-30 DIAGNOSIS — M00.88: Primary | ICD-10-CM

## 2025-08-30 PROCEDURE — 96365 THER/PROPH/DIAG IV INF INIT: CPT

## 2025-08-30 PROCEDURE — 96368 THER/DIAG CONCURRENT INF: CPT

## 2025-08-30 PROCEDURE — 25010000002 DAPTOMYCIN PER 1 MG: Performed by: NURSE PRACTITIONER

## 2025-08-30 PROCEDURE — 25010000002 CEFTRIAXONE PER 250 MG: Performed by: NURSE PRACTITIONER

## 2025-08-30 RX ADMIN — DAPTOMYCIN 550 MG: 500 INJECTION, POWDER, LYOPHILIZED, FOR SOLUTION INTRAVENOUS at 08:42

## 2025-08-30 RX ADMIN — SODIUM CHLORIDE 2000 MG: 900 INJECTION INTRAVENOUS at 08:44

## 2025-08-31 VITALS
RESPIRATION RATE: 18 BRPM | SYSTOLIC BLOOD PRESSURE: 151 MMHG | TEMPERATURE: 97.9 F | DIASTOLIC BLOOD PRESSURE: 96 MMHG | OXYGEN SATURATION: 96 % | HEART RATE: 73 BPM

## (undated) DEVICE — SNAR POLYP HOTSNARE/BRAIDED OVL/MINI 7F 2.8X10MM 230CM 1P/U

## (undated) DEVICE — TRAP WIDEEYE POLYP

## (undated) DEVICE — PK ENDO GI 50